# Patient Record
Sex: FEMALE | Race: WHITE | Employment: OTHER | URBAN - METROPOLITAN AREA
[De-identification: names, ages, dates, MRNs, and addresses within clinical notes are randomized per-mention and may not be internally consistent; named-entity substitution may affect disease eponyms.]

---

## 2017-04-18 ENCOUNTER — APPOINTMENT (OUTPATIENT)
Dept: GENERAL RADIOLOGY | Facility: CLINIC | Age: 82
End: 2017-04-18
Attending: EMERGENCY MEDICINE
Payer: MEDICARE

## 2017-04-18 ENCOUNTER — HOSPITAL ENCOUNTER (EMERGENCY)
Facility: CLINIC | Age: 82
Discharge: HOME | End: 2017-04-18
Attending: EMERGENCY MEDICINE | Admitting: EMERGENCY MEDICINE
Payer: MEDICARE

## 2017-04-18 VITALS
DIASTOLIC BLOOD PRESSURE: 72 MMHG | BODY MASS INDEX: 27.56 KG/M2 | HEIGHT: 61 IN | RESPIRATION RATE: 20 BRPM | TEMPERATURE: 98.1 F | WEIGHT: 146 LBS | SYSTOLIC BLOOD PRESSURE: 109 MMHG | OXYGEN SATURATION: 97 %

## 2017-04-18 DIAGNOSIS — M25.562 ACUTE PAIN OF BOTH KNEES: ICD-10-CM

## 2017-04-18 DIAGNOSIS — M25.561 ACUTE PAIN OF BOTH KNEES: ICD-10-CM

## 2017-04-18 LAB
ALBUMIN UR-MCNC: NEGATIVE MG/DL
AMORPH CRY #/AREA URNS HPF: ABNORMAL /HPF
ANION GAP SERPL CALCULATED.3IONS-SCNC: 12 MMOL/L (ref 3–14)
ANISOCYTOSIS BLD QL SMEAR: SLIGHT
APPEARANCE UR: ABNORMAL
BACTERIA #/AREA URNS HPF: ABNORMAL /HPF
BASOPHILS # BLD AUTO: 0 10E9/L (ref 0–0.2)
BASOPHILS NFR BLD AUTO: 0 %
BILIRUB UR QL STRIP: NEGATIVE
BUN SERPL-MCNC: 24 MG/DL (ref 7–30)
CALCIUM SERPL-MCNC: 9 MG/DL (ref 8.5–10.1)
CHLORIDE SERPL-SCNC: 102 MMOL/L (ref 94–109)
CO2 SERPL-SCNC: 22 MMOL/L (ref 20–32)
COLOR UR AUTO: ABNORMAL
CREAT SERPL-MCNC: 1.02 MG/DL (ref 0.52–1.04)
DACRYOCYTES BLD QL SMEAR: SLIGHT
DIFFERENTIAL METHOD BLD: ABNORMAL
EOSINOPHIL # BLD AUTO: 0 10E9/L (ref 0–0.7)
EOSINOPHIL NFR BLD AUTO: 0 %
ERYTHROCYTE [DISTWIDTH] IN BLOOD BY AUTOMATED COUNT: 15 % (ref 10–15)
GFR SERPL CREATININE-BSD FRML MDRD: 51 ML/MIN/1.7M2
GLUCOSE SERPL-MCNC: 124 MG/DL (ref 70–99)
GLUCOSE UR STRIP-MCNC: NEGATIVE MG/DL
HCT VFR BLD AUTO: 39.1 % (ref 35–47)
HGB BLD-MCNC: 12.4 G/DL (ref 11.7–15.7)
HGB UR QL STRIP: NEGATIVE
HYALINE CASTS #/AREA URNS LPF: 6 /LPF (ref 0–2)
INR PPP: 2.32 (ref 0.86–1.14)
KETONES UR STRIP-MCNC: 20 MG/DL
LEUKOCYTE ESTERASE UR QL STRIP: NEGATIVE
LYMPHOCYTES # BLD AUTO: 0.6 10E9/L (ref 0.8–5.3)
LYMPHOCYTES NFR BLD AUTO: 6 %
MCH RBC QN AUTO: 30.6 PG (ref 26.5–33)
MCHC RBC AUTO-ENTMCNC: 31.7 G/DL (ref 31.5–36.5)
MCV RBC AUTO: 97 FL (ref 78–100)
MONOCYTES # BLD AUTO: 2 10E9/L (ref 0–1.3)
MONOCYTES NFR BLD AUTO: 21 %
MUCOUS THREADS #/AREA URNS LPF: PRESENT /LPF
NEUTROPHILS # BLD AUTO: 6.9 10E9/L (ref 1.6–8.3)
NEUTROPHILS NFR BLD AUTO: 73 %
NITRATE UR QL: NEGATIVE
PH UR STRIP: 5 PH (ref 5–7)
PLATELET # BLD AUTO: 96 10E9/L (ref 150–450)
PLATELET # BLD EST: ABNORMAL 10*3/UL
POIKILOCYTOSIS BLD QL SMEAR: SLIGHT
POTASSIUM SERPL-SCNC: 4.5 MMOL/L (ref 3.4–5.3)
RBC # BLD AUTO: 4.05 10E12/L (ref 3.8–5.2)
RBC #/AREA URNS AUTO: 3 /HPF (ref 0–2)
SODIUM SERPL-SCNC: 136 MMOL/L (ref 133–144)
SP GR UR STRIP: 1.02 (ref 1–1.03)
SQUAMOUS #/AREA URNS AUTO: 1 /HPF (ref 0–1)
URN SPEC COLLECT METH UR: ABNORMAL
UROBILINOGEN UR STRIP-MCNC: 0 MG/DL (ref 0–2)
WBC # BLD AUTO: 9.5 10E9/L (ref 4–11)
WBC #/AREA URNS AUTO: 2 /HPF (ref 0–2)

## 2017-04-18 PROCEDURE — 85610 PROTHROMBIN TIME: CPT | Performed by: EMERGENCY MEDICINE

## 2017-04-18 PROCEDURE — 36415 COLL VENOUS BLD VENIPUNCTURE: CPT | Performed by: EMERGENCY MEDICINE

## 2017-04-18 PROCEDURE — 99284 EMERGENCY DEPT VISIT MOD MDM: CPT

## 2017-04-18 PROCEDURE — 81001 URINALYSIS AUTO W/SCOPE: CPT | Performed by: EMERGENCY MEDICINE

## 2017-04-18 PROCEDURE — 25000132 ZZH RX MED GY IP 250 OP 250 PS 637: Mod: GY | Performed by: EMERGENCY MEDICINE

## 2017-04-18 PROCEDURE — 85025 COMPLETE CBC W/AUTO DIFF WBC: CPT | Performed by: EMERGENCY MEDICINE

## 2017-04-18 PROCEDURE — 80048 BASIC METABOLIC PNL TOTAL CA: CPT | Performed by: EMERGENCY MEDICINE

## 2017-04-18 PROCEDURE — A9270 NON-COVERED ITEM OR SERVICE: HCPCS | Mod: GY | Performed by: EMERGENCY MEDICINE

## 2017-04-18 RX ORDER — ACETAMINOPHEN 325 MG/1
650 TABLET ORAL ONCE
Status: COMPLETED | OUTPATIENT
Start: 2017-04-18 | End: 2017-04-18

## 2017-04-18 RX ORDER — ACETAMINOPHEN AND CODEINE PHOSPHATE 300; 30 MG/1; MG/1
1 TABLET ORAL EVERY 6 HOURS PRN
Qty: 15 TABLET | Refills: 0 | Status: SHIPPED | OUTPATIENT
Start: 2017-04-18 | End: 2018-06-08

## 2017-04-18 RX ADMIN — ACETAMINOPHEN AND CODEINE PHOSPHATE 1 TABLET: 300; 30 TABLET ORAL at 11:32

## 2017-04-18 RX ADMIN — ACETAMINOPHEN 650 MG: 325 TABLET, FILM COATED ORAL at 09:52

## 2017-04-18 ASSESSMENT — ENCOUNTER SYMPTOMS
BACK PAIN: 1
NUMBNESS: 0

## 2017-04-18 NOTE — ED AVS SNAPSHOT
[facility] Emergency Department    [address]    [facility] [address]    Phone:  [phone]    Fax:  [phone]                                       Tameka Andersen   MRN: [id number]    Department:  [facility] Emergency Department   Date of Visit:  4/18/2017           Patient Information     Date Of Birth          1/7/1924        Your diagnoses for this visit were:     Acute pain of both knees        You were seen by Yoshi Almonte MD.      Follow-up Information     Follow up with Markie Becker MD. Schedule an appointment as soon as possible for a visit in 3 days.    Specialty:  Family Practice    Contact information:    [facility]  [address] [facility] [address]  [phone]          Follow up with [facility] Emergency Department.    Specialty:  EMERGENCY MEDICINE    Why:  As needed, If symptoms worsen    Contact information:    [address]  [facility] [id number] [phone]        Discharge Instructions         Reducing Knee Pain and Swelling    Many treatments can help reduce pain and swelling in your knee. Your healthcare provider or physical therapist may suggest one or more of the following treatments:    Icing your knee helps reduce swelling. You may be asked to ice your knee once a day or more. Apply ice for about 15 to 20 minutes at a time, with at least 40 minutes between sessions. Always keep a towel between the ice and your skin.     Keeping your leg raised above your heart helps excess fluid flow out of your knee joint. This reduces swelling.    Compression means wrapping an elastic bandage or neoprene sleeve snugly around your knees. This keeps fluid from collecting in your knee joint.    Electrical stimulation, done by a physical therapist or , can help reduce excess fluid in your knee joint.    Anti-inflammatory medicines may be prescribed by your healthcare  provider. You may take pills or receive injections in your knee.    Isometric (bushra) exercises strengthen the muscles that support your knee joint. They also help reduce excess fluid in your knee.    Massage helps fluid drain away from your knee.    5800-1094 [employer]. [address] [place] [address], [place] [address]. All rights reserved. This information is not intended as a substitute for professional medical care. Always follow your healthcare professional's instructions.          24 Hour Appointment Hotline       To make an appointment at any [facility], call [phone] ([phone]). If you don't have a family doctor or clinic, we will help you find one. [facility] clinics are conveniently located to serve the needs of you and your family.             Review of your medicines      START taking        Dose / Directions Last dose taken    acetaminophen-codeine 300-30 MG per tablet   Commonly known as:  TYLENOL/codeine #3   Dose:  1 tablet   Quantity:  15 tablet        Take 1 tablet by mouth every 6 hours as needed for pain   Refills:  0          Our records show that you are taking the medicines listed below. If these are incorrect, please call your family doctor or clinic.        Dose / Directions Last dose taken    ibuprofen 200 MG tablet   Commonly known as:  ADVIL/MOTRIN   Dose:  400 mg   Quantity:  30 tablet        Take 2 tablets by mouth every 8 hours as needed for pain.   Refills:  0        METOPROLOL TARTRATE PO        Refills:  0        WARFARIN SODIUM PO        Refills:  0                Prescriptions were sent or printed at these locations (1 Prescription)                   Other Prescriptions                Printed at Department/Unit printer (1 of 1)         acetaminophen-codeine (TYLENOL/CODEINE #3) 300-30 MG per tablet                Procedures and tests performed during your visit     Basic metabolic panel    CBC with platelets differential    INR    Straight cath for  urine    UA with Microscopic reflex to Culture    XR Knee Bilateral 1/2 Views      Orders Needing Specimen Collection     None      Pending Results     No orders found from 4/16/2017 to 4/19/2017.            Pending Culture Results     No orders found from 4/16/2017 to 4/19/2017.            Test Results From Your Hospital Stay        4/18/2017 10:55 AM      Narrative     XR KNEE BILATERAL 1/2 VW  4/18/2017 10:51 AM    HISTORY:  bilateral knee pain    COMPARISON:  None.        Impression     IMPRESSION:  Osteopenia. Small bilateral joint effusions. Very mild  osseous degenerative changes of the medial compartments bilaterally  with mild medial compartment narrowing on the right. Bilateral  posterior patellar spurs. No acute process.     JAIME REED MD         4/18/2017  1:00 PM      Component Results     Component Value Ref Range & Units Status    Color Urine Evelia  Final    Appearance Urine Slightly Cloudy  Final    Glucose Urine Negative NEG mg/dL Final    Bilirubin Urine Negative NEG Final    Ketones Urine 20 (A) NEG mg/dL Final    Specific Gravity Urine 1.021 1.003 - 1.035 Final    Blood Urine Negative NEG Final    pH Urine 5.0 5.0 - 7.0 pH Final    Protein Albumin Urine Negative NEG mg/dL Final    Urobilinogen mg/dL 0.0 0.0 - 2.0 mg/dL Final    Nitrite Urine Negative NEG Final    Leukocyte Esterase Urine Negative NEG Final    Source Catheterized Urine  Final    WBC Urine 2 0 - 2 /HPF Final    RBC Urine 3 (H) 0 - 2 /HPF Final    Bacteria Urine Few (A) NEG /HPF Final    Squamous Epithelial /HPF Urine 1 0 - 1 /HPF Final    Mucous Urine Present (A) NEG /LPF Final    Hyaline Casts 6 (H) 0 - 2 /LPF Final    Amorphous Crystals Few (A) NEG /HPF Final         4/18/2017 10:36 AM      Component Results     Component Value Ref Range & Units Status    Sodium 136 133 - 144 mmol/L Final    Potassium 4.5 3.4 - 5.3 mmol/L Final    Chloride 102 94 - 109 mmol/L Final    Carbon Dioxide 22 20 - 32 mmol/L Final    Anion Gap 12 3 -  14 mmol/L Final    Glucose 124 (H) 70 - 99 mg/dL Final    Urea Nitrogen 24 7 - 30 mg/dL Final    Creatinine 1.02 0.52 - 1.04 mg/dL Final    GFR Estimate 51 (L) >60 mL/min/1.7m2 Final    Non  GFR Calc    GFR Estimate If Black 61 >60 mL/min/1.7m2 Final    African American GFR Calc    Calcium 9.0 8.5 - 10.1 mg/dL Final         4/18/2017 10:42 AM      Component Results     Component Value Ref Range & Units Status    WBC 9.5 4.0 - 11.0 10e9/L Final    RBC Count 4.05 3.8 - 5.2 10e12/L Final    Hemoglobin 12.4 11.7 - 15.7 g/dL Final    Hematocrit 39.1 35.0 - 47.0 % Final    MCV 97 78 - 100 fl Final    MCH 30.6 26.5 - 33.0 pg Final    MCHC 31.7 31.5 - 36.5 g/dL Final    RDW 15.0 10.0 - 15.0 % Final    Platelet Count 96 (L) 150 - 450 10e9/L Final    Diff Method Manual Differential  Final    % Neutrophils 73.0 % Final    % Lymphocytes 6.0 % Final    % Monocytes 21.0 % Final    % Eosinophils 0.0 % Final    % Basophils 0.0 % Final    Absolute Neutrophil 6.9 1.6 - 8.3 10e9/L Final    Absolute Lymphocytes 0.6 (L) 0.8 - 5.3 10e9/L Final    Absolute Monocytes 2.0 (H) 0.0 - 1.3 10e9/L Final    Absolute Eosinophils 0.0 0.0 - 0.7 10e9/L Final    Absolute Basophils 0.0 0.0 - 0.2 10e9/L Final    Anisocytosis Slight  Final    Poikilocytosis Slight  Final    Teardrop Cells Slight  Final    Platelet Estimate Decreased  Final         4/18/2017 10:25 AM      Component Results     Component Value Ref Range & Units Status    INR 2.32 (H) 0.86 - 1.14 Final                Clinical Quality Measure: Blood Pressure Screening     Your blood pressure was checked while you were in the emergency department today. The last reading we obtained was  BP: 118/72 . Please read the guidelines below about what these numbers mean and what you should do about them.  If your systolic blood pressure (the top number) is less than 120 and your diastolic blood pressure (the bottom number) is less than 80, then your blood pressure is normal. There is  "nothing more that you need to do about it.  If your systolic blood pressure (the top number) is 120-139 or your diastolic blood pressure (the bottom number) is 80-89, your blood pressure may be higher than it should be. You should have your blood pressure rechecked within a year by a primary care provider.  If your systolic blood pressure (the top number) is 140 or greater or your diastolic blood pressure (the bottom number) is 90 or greater, you may have high blood pressure. High blood pressure is treatable, but if left untreated over time it can put you at risk for heart attack, stroke, or kidney failure. You should have your blood pressure rechecked by a primary care provider within the next 4 weeks.  If your provider in the emergency department today gave you specific instructions to follow-up with your doctor or provider even sooner than that, you should follow that instruction and not wait for up to 4 weeks for your follow-up visit.        Thank you for choosing [facility]       Thank you for choosing [facility] for your care. Our goal is always to provide you with excellent care. Hearing back from our patients is one way we can continue to improve our services. Please take a few minutes to complete the written survey that you may receive in the mail after you visit with us. Thank you!        [employer] Information     [employer] lets you send messages to your doctor, view your test results, renew your prescriptions, schedule appointments and more. To sign up, go to [email or web address] . Click on \"Log in\" on the left side of the screen, which will take you to the Welcome page. Then click on \"Sign up Now\" on the right side of the page.     You will be asked to enter the access code listed below, as well as some personal information. Please follow the directions to create your username and password.     Your access code is: [email or web address]  Expires: 2017  1:35 PM     Your access code will  in 90 days. If you " need help or a new code, please call your [facility] clinic or [phone].        Care EveryWhere ID     This is your Care EveryWhere ID. This could be used by other organizations to access your [facility] medical records  [id number]        After Visit Summary       This is your record. Keep this with you and show to your community pharmacist(s) and doctor(s) at your next visit.

## 2017-04-18 NOTE — ED AVS SNAPSHOT
[facility] Emergency Department    [address]    [facility] [address]    Phone:  [phone]    Fax:  [phone]                                       Tameka Andersen   MRN: [id number]    Department:  [facility] Emergency Department   Date of Visit:  4/18/2017           After Visit Summary Signature Page     I have received my discharge instructions, and my questions have been answered. I have discussed any challenges I see with this plan with the nurse or doctor.    ..........................................................................................................................................  Patient/Patient Representative Signature      ..........................................................................................................................................  Patient Representative Print Name and Relationship to Patient    ..................................................               ................................................  Date                                            Time    ..........................................................................................................................................  Reviewed by Signature/Title    ...................................................              ..............................................  Date                                                            Time

## 2017-04-18 NOTE — ED PROVIDER NOTES
"  History     Chief Complaint:  Leg Pain    HPI   Tameka Andersen is a 89 year old female who presents with bilateral leg pain. The patient first began experiencing left knee pain three days ago, and developed right knee pain shortly after. She states that it has been very difficult for her to walk due to the pain. Patient reports she has been on her feet more than usual, as she was making Easter dinner for two friends over the weekend. She has not had any numbness or tingling in her legs. She has needed steroid injection in one of her knees in the past. The patient does complain of some back pain over the past few months, and has been unable to lie flat at night. She also has been having some kidney issues which she has been seeing her PCP for.     Allergies:  Thorazine     Medications:    Warfarin sodium  Metoprolol tartrate  Ibuprofen    Past Medical History:    History reviewed.  No significant past medical history.    Past Surgical History:    History reviewed.  No significant past surgical history.    Family History:    History reviewed.  No significant family history.    Social History:  Relationship status:   Tobacco Use: Negative  Alcohol Use: Seldom  The patient presents with a friend.  The patient is retired.    Review of Systems   Musculoskeletal: Positive for back pain.        Positive for leg pain.   Neurological: Negative for numbness.   All other systems reviewed and are negative.      Physical Exam   First Vitals:  BP: 143/67  Temp: 98.1  F (36.7  C)  Temp src: Oral  Resp: 20  SpO2: 94 %  Height: 154.9 cm (5' 1\")  Weight: 66.2 kg (146 lb)    Physical Exam  Nursing note and vitals reviewed.  Constitutional: Cooperative. Appears younger than stated age.   HENT:   Mouth/Throat: Moist mucous membranes.   Eyes: EOMI, nonicteric sclera  Cardiovascular: Normal rate, regular rhythm, no murmurs, rubs, or gallops  Pulmonary/Chest: Effort normal and breath sounds normal. No respiratory distress. No " wheezes. No rales.   Abdominal: Soft. Nontender, nondistended, no guarding or rigidity. BS present.   Musculoskeletal: Knee ROM limited 2/2 pain bilaterally. Mild bilateral effusions. No warmth/erythema. No edema. Left paralumbar muscle tenderness.   Neurological: Alert. Moves all extremities spontaneously. Normal sensation BLE.    Skin: Skin is warm and dry. No rash noted.   Psychiatric: Normal mood and affect.       Emergency Department Course     Imaging:  Radiographic findings were communicated with the patient who voiced understanding of the findings.    Bilateral Knee XR, per radiology:   Osteopenia. Small bilateral joint effusions. Very mild osseous degenerative changes of the medial compartments bilaterally with mild medial compartment narrowing on the right. Bilateral posterior patellar spurs. No acute process.     Laboratory:  CBC: WBC 9.5, HGB 12.4, PLT 96 (L)  BMP: Glucose 124 (H), GFR 51 (L), o/w WNL (Creatinine 1.02)  1009: INR: 2.32 (H)  UA: Slightly cloudy, flory urine: Ketone 20 (A), RBC/HPF 3 (H), Bacteria few (A), Mucous present (A), Hyaline casts 6 (H), Amorphous crystals few (A), o/w WNL    Interventions:  0952: Tylenol, 650 mg, PO  1132: Tylenol #3, 300-30 mg, 1 tablet, PO    Emergency Department Course:  Nursing notes and vitals reviewed.  I performed an exam of the patient as documented above.  The above workup was undertaken.  1313: I rechecked the patient and discussed results.    Findings and plan explained to the Patient. Patient discharged home, status improved, with instructions regarding supportive care, medications, and reasons to return as well as the importance of close follow-up was reviewed.      Impression & Plan      Medical Decision Making:  Tameka Andersen is a 89 year old female presents with bilateral knee pain. Patient has also had chronic left flank pain as well. Uncertain etiology of patient's pain at this time. UA is negative, ruling out renal disease. Pain is worse with  movement, and appears to be more suggestive of musculoskeletal pain. Patient has had knee pain in the past, which has responded well to steroid injections. XR's consistent with arthritis. Doubt any radiculopathy or Cauda-Equina syndrome as cause of her symptoms. Initially tried tylenol, which was ineffective, but tylenol 3 was more effective. Also gave patient a walker, and she was able to ambulate much more effectively. Instructed to follow up with PCP later this week for recheck.     She is in stable condition at the time of discharge, indications for return to the ED were discussed as well as follow up. All questions were answered and She is in agreement with the plan.    Diagnosis:    ICD-10-CM   1. Acute pain of both knees M25.561    M25.562     Disposition:  Discharge to home with primary care follow up.     Discharge Medications:   ACETAMINOPHEN-CODEINE (TYLENOL/CODEINE #3) 300-30 MG PER TABLET    Take 1 tablet by mouth every 6 hours as needed for pain     Lopez VILLALPANDO, am serving as a scribe on 4/18/2017 at 9:27 AM to personally document services performed by Yoshi Almonte MD, based on my observations and the provider's statements to me.    [facility] EMERGENCY DEPARTMENT       Yoshi Almonte MD  04/18/17 [id number]

## 2017-04-18 NOTE — ED NOTES
"On Saturday knees started hurting while in [employer] service.  Pain to left knee, now also in right knee.  States, \"it got to I just couldn't walk\".  Also states for past two months if has been lying for a long time \"back hurts so bad\", pointing to left flank area. Denies urinary symptoms.  States, \"doctor says my kidneys aren't working 100%.\"  Lives at home.  Last night slept downstrairs as was too hard to get up and down the stairs. Pain 10/10 at this time.  Has not taken anything for pain.  ABCD's intact; a/O x 4.   "

## 2017-04-18 NOTE — DISCHARGE INSTRUCTIONS
Reducing Knee Pain and Swelling    Many treatments can help reduce pain and swelling in your knee. Your healthcare provider or physical therapist may suggest one or more of the following treatments:    Icing your knee helps reduce swelling. You may be asked to ice your knee once a day or more. Apply ice for about 15 to 20 minutes at a time, with at least 40 minutes between sessions. Always keep a towel between the ice and your skin.     Keeping your leg raised above your heart helps excess fluid flow out of your knee joint. This reduces swelling.    Compression means wrapping an elastic bandage or neoprene sleeve snugly around your knees. This keeps fluid from collecting in your knee joint.    Electrical stimulation, done by a physical therapist or , can help reduce excess fluid in your knee joint.    Anti-inflammatory medicines may be prescribed by your healthcare provider. You may take pills or receive injections in your knee.    Isometric (bushra) exercises strengthen the muscles that support your knee joint. They also help reduce excess fluid in your knee.    Massage helps fluid drain away from your knee.    3169-9193 [employer]. [address] [place] [address], [place] [address]. All rights reserved. This information is not intended as a substitute for professional medical care. Always follow your healthcare professional's instructions.

## 2018-06-11 ENCOUNTER — HOSPITAL ENCOUNTER (OUTPATIENT)
Dept: LAB | Facility: CLINIC | Age: 83
Discharge: HOME | DRG: 483 | End: 2018-06-11
Attending: ORTHOPAEDIC SURGERY | Admitting: ORTHOPAEDIC SURGERY
Payer: MEDICARE

## 2018-06-11 DIAGNOSIS — Z01.812 PRE-OPERATIVE LABORATORY EXAMINATION: ICD-10-CM

## 2018-06-11 LAB
ABO + RH BLD: NORMAL
ABO + RH BLD: NORMAL
BLD GP AB SCN SERPL QL: NORMAL
BLOOD BANK CMNT PATIENT-IMP: NORMAL
SPECIMEN EXP DATE BLD: NORMAL

## 2018-06-11 PROCEDURE — 86901 BLOOD TYPING SEROLOGIC RH(D): CPT | Performed by: ORTHOPAEDIC SURGERY

## 2018-06-11 PROCEDURE — 86850 RBC ANTIBODY SCREEN: CPT | Performed by: ORTHOPAEDIC SURGERY

## 2018-06-11 PROCEDURE — 36415 COLL VENOUS BLD VENIPUNCTURE: CPT | Performed by: ORTHOPAEDIC SURGERY

## 2018-06-11 PROCEDURE — 86900 BLOOD TYPING SEROLOGIC ABO: CPT | Performed by: ORTHOPAEDIC SURGERY

## 2018-06-12 ENCOUNTER — HOSPITAL ENCOUNTER (INPATIENT)
Facility: CLINIC | Age: 83
LOS: 3 days | Discharge: NURSING, REHAB OR LONG-TERM CARE | DRG: 483 | End: 2018-06-15
Attending: ORTHOPAEDIC SURGERY | Admitting: ORTHOPAEDIC SURGERY
Payer: MEDICARE

## 2018-06-12 ENCOUNTER — ANESTHESIA EVENT (OUTPATIENT)
Dept: SURGERY | Facility: CLINIC | Age: 83
DRG: 483 | End: 2018-06-12
Payer: MEDICARE

## 2018-06-12 ENCOUNTER — ANESTHESIA (OUTPATIENT)
Dept: SURGERY | Facility: CLINIC | Age: 83
DRG: 483 | End: 2018-06-12
Payer: MEDICARE

## 2018-06-12 ENCOUNTER — APPOINTMENT (OUTPATIENT)
Dept: GENERAL RADIOLOGY | Facility: CLINIC | Age: 83
DRG: 483 | End: 2018-06-12
Attending: ORTHOPAEDIC SURGERY
Payer: MEDICARE

## 2018-06-12 DIAGNOSIS — S42.402D CLOSED FRACTURE OF LEFT ELBOW WITH ROUTINE HEALING, SUBSEQUENT ENCOUNTER: Primary | ICD-10-CM

## 2018-06-12 PROBLEM — S42.402A ELBOW FRACTURE, LEFT: Status: ACTIVE | Noted: 2018-06-12

## 2018-06-12 LAB — INR PPP: 1.22 (ref 0.86–1.14)

## 2018-06-12 PROCEDURE — 01S40ZZ REPOSITION ULNAR NERVE, OPEN APPROACH: ICD-10-PCS | Performed by: ORTHOPAEDIC SURGERY

## 2018-06-12 PROCEDURE — 25000128 H RX IP 250 OP 636: Performed by: PHYSICIAN ASSISTANT

## 2018-06-12 PROCEDURE — 40000277 XR SURGERY CARM FLUORO LESS THAN 5 MIN W STILLS

## 2018-06-12 PROCEDURE — 25000125 ZZHC RX 250: Performed by: ORTHOPAEDIC SURGERY

## 2018-06-12 PROCEDURE — A9270 NON-COVERED ITEM OR SERVICE: HCPCS | Mod: GY | Performed by: ORTHOPAEDIC SURGERY

## 2018-06-12 PROCEDURE — 0RRM0JZ REPLACEMENT OF LEFT ELBOW JOINT WITH SYNTHETIC SUBSTITUTE, OPEN APPROACH: ICD-10-PCS | Performed by: ORTHOPAEDIC SURGERY

## 2018-06-12 PROCEDURE — 25000128 H RX IP 250 OP 636: Performed by: ANESTHESIOLOGY

## 2018-06-12 PROCEDURE — 36000065 ZZH SURGERY LEVEL 4 W FLUORO 1ST 30 MIN: Performed by: ORTHOPAEDIC SURGERY

## 2018-06-12 PROCEDURE — 37000009 ZZH ANESTHESIA TECHNICAL FEE, EACH ADDTL 15 MIN: Performed by: ORTHOPAEDIC SURGERY

## 2018-06-12 PROCEDURE — 36000063 ZZH SURGERY LEVEL 4 EA 15 ADDTL MIN: Performed by: ORTHOPAEDIC SURGERY

## 2018-06-12 PROCEDURE — C1713 ANCHOR/SCREW BN/BN,TIS/BN: HCPCS | Performed by: ORTHOPAEDIC SURGERY

## 2018-06-12 PROCEDURE — 25000132 ZZH RX MED GY IP 250 OP 250 PS 637: Mod: GY | Performed by: ORTHOPAEDIC SURGERY

## 2018-06-12 PROCEDURE — 25000566 ZZH SEVOFLURANE, EA 15 MIN: Performed by: ORTHOPAEDIC SURGERY

## 2018-06-12 PROCEDURE — 27210794 ZZH OR GENERAL SUPPLY STERILE: Performed by: ORTHOPAEDIC SURGERY

## 2018-06-12 PROCEDURE — 85610 PROTHROMBIN TIME: CPT | Performed by: ANESTHESIOLOGY

## 2018-06-12 PROCEDURE — 27810169 ZZH OR IMPLANT GENERAL: Performed by: ORTHOPAEDIC SURGERY

## 2018-06-12 PROCEDURE — 25000128 H RX IP 250 OP 636: Performed by: ORTHOPAEDIC SURGERY

## 2018-06-12 PROCEDURE — 71000012 ZZH RECOVERY PHASE 1 LEVEL 1 FIRST HR: Performed by: ORTHOPAEDIC SURGERY

## 2018-06-12 PROCEDURE — 25000128 H RX IP 250 OP 636: Performed by: NURSE ANESTHETIST, CERTIFIED REGISTERED

## 2018-06-12 PROCEDURE — 36415 COLL VENOUS BLD VENIPUNCTURE: CPT | Performed by: ANESTHESIOLOGY

## 2018-06-12 PROCEDURE — 25000128 H RX IP 250 OP 636

## 2018-06-12 PROCEDURE — 25800025 ZZH RX 258: Performed by: ORTHOPAEDIC SURGERY

## 2018-06-12 PROCEDURE — 40000985 XR ELBOW PORT LT 2VW: Mod: LT

## 2018-06-12 PROCEDURE — 71000013 ZZH RECOVERY PHASE 1 LEVEL 1 EA ADDTL HR: Performed by: ORTHOPAEDIC SURGERY

## 2018-06-12 PROCEDURE — 40000171 ZZH STATISTIC PRE-PROCEDURE ASSESSMENT III: Performed by: ORTHOPAEDIC SURGERY

## 2018-06-12 PROCEDURE — 25000125 ZZHC RX 250: Performed by: NURSE ANESTHETIST, CERTIFIED REGISTERED

## 2018-06-12 PROCEDURE — 12000007 ZZH R&B INTERMEDIATE

## 2018-06-12 PROCEDURE — 37000008 ZZH ANESTHESIA TECHNICAL FEE, 1ST 30 MIN: Performed by: ORTHOPAEDIC SURGERY

## 2018-06-12 PROCEDURE — 0PSL04Z REPOSITION LEFT ULNA WITH INTERNAL FIXATION DEVICE, OPEN APPROACH: ICD-10-PCS | Performed by: ORTHOPAEDIC SURGERY

## 2018-06-12 DEVICE — WIRE SURGICAL STEEL 18GA DS-18: Type: IMPLANTABLE DEVICE | Site: ELBOW | Status: FUNCTIONAL

## 2018-06-12 DEVICE — BONE CEMENT SIMPLEX W/TOBRAMYCIN 6197-9-001: Type: IMPLANTABLE DEVICE | Site: ELBOW | Status: FUNCTIONAL

## 2018-06-12 DEVICE — IMPLANTABLE DEVICE: Type: IMPLANTABLE DEVICE | Site: ELBOW | Status: FUNCTIONAL

## 2018-06-12 DEVICE — IMP WIRE KIRSCHNER 0.062X4" 1646-10-000: Type: IMPLANTABLE DEVICE | Site: ELBOW | Status: FUNCTIONAL

## 2018-06-12 RX ORDER — FENTANYL CITRATE 50 UG/ML
25-50 INJECTION, SOLUTION INTRAMUSCULAR; INTRAVENOUS
Status: DISCONTINUED | OUTPATIENT
Start: 2018-06-12 | End: 2018-06-12 | Stop reason: HOSPADM

## 2018-06-12 RX ORDER — GABAPENTIN 100 MG/1
100 CAPSULE ORAL 3 TIMES DAILY
Status: COMPLETED | OUTPATIENT
Start: 2018-06-12 | End: 2018-06-15

## 2018-06-12 RX ORDER — FENTANYL CITRATE 50 UG/ML
INJECTION, SOLUTION INTRAMUSCULAR; INTRAVENOUS PRN
Status: DISCONTINUED | OUTPATIENT
Start: 2018-06-12 | End: 2018-06-12

## 2018-06-12 RX ORDER — HYDROMORPHONE HYDROCHLORIDE 1 MG/ML
.3-.5 INJECTION, SOLUTION INTRAMUSCULAR; INTRAVENOUS; SUBCUTANEOUS
Status: DISCONTINUED | OUTPATIENT
Start: 2018-06-12 | End: 2018-06-15 | Stop reason: HOSPADM

## 2018-06-12 RX ORDER — ONDANSETRON 4 MG/1
4 TABLET, ORALLY DISINTEGRATING ORAL EVERY 6 HOURS PRN
Status: DISCONTINUED | OUTPATIENT
Start: 2018-06-12 | End: 2018-06-15 | Stop reason: HOSPADM

## 2018-06-12 RX ORDER — VANCOMYCIN HCL 900 MCG/MG
POWDER (GRAM) MISCELLANEOUS PRN
Status: DISCONTINUED | OUTPATIENT
Start: 2018-06-12 | End: 2018-06-12 | Stop reason: HOSPADM

## 2018-06-12 RX ORDER — HYDROMORPHONE HYDROCHLORIDE 1 MG/ML
.3-.5 INJECTION, SOLUTION INTRAMUSCULAR; INTRAVENOUS; SUBCUTANEOUS EVERY 10 MIN PRN
Status: DISCONTINUED | OUTPATIENT
Start: 2018-06-12 | End: 2018-06-12 | Stop reason: HOSPADM

## 2018-06-12 RX ORDER — MEPERIDINE HYDROCHLORIDE 25 MG/ML
12.5 INJECTION INTRAMUSCULAR; INTRAVENOUS; SUBCUTANEOUS
Status: DISCONTINUED | OUTPATIENT
Start: 2018-06-12 | End: 2018-06-12 | Stop reason: HOSPADM

## 2018-06-12 RX ORDER — ACETAMINOPHEN 325 MG/1
975 TABLET ORAL EVERY 8 HOURS
Status: COMPLETED | OUTPATIENT
Start: 2018-06-12 | End: 2018-06-15

## 2018-06-12 RX ORDER — CEFAZOLIN SODIUM 2 G/100ML
2 INJECTION, SOLUTION INTRAVENOUS
Status: COMPLETED | OUTPATIENT
Start: 2018-06-12 | End: 2018-06-12

## 2018-06-12 RX ORDER — PROPOFOL 10 MG/ML
INJECTION, EMULSION INTRAVENOUS PRN
Status: DISCONTINUED | OUTPATIENT
Start: 2018-06-12 | End: 2018-06-12

## 2018-06-12 RX ORDER — SODIUM CHLORIDE, SODIUM LACTATE, POTASSIUM CHLORIDE, CALCIUM CHLORIDE 600; 310; 30; 20 MG/100ML; MG/100ML; MG/100ML; MG/100ML
INJECTION, SOLUTION INTRAVENOUS CONTINUOUS
Status: DISCONTINUED | OUTPATIENT
Start: 2018-06-12 | End: 2018-06-12 | Stop reason: HOSPADM

## 2018-06-12 RX ORDER — NALOXONE HYDROCHLORIDE 0.4 MG/ML
.1-.4 INJECTION, SOLUTION INTRAMUSCULAR; INTRAVENOUS; SUBCUTANEOUS
Status: DISCONTINUED | OUTPATIENT
Start: 2018-06-12 | End: 2018-06-15 | Stop reason: HOSPADM

## 2018-06-12 RX ORDER — DEXAMETHASONE SODIUM PHOSPHATE 4 MG/ML
INJECTION, SOLUTION INTRA-ARTICULAR; INTRALESIONAL; INTRAMUSCULAR; INTRAVENOUS; SOFT TISSUE PRN
Status: DISCONTINUED | OUTPATIENT
Start: 2018-06-12 | End: 2018-06-12

## 2018-06-12 RX ORDER — LIDOCAINE HYDROCHLORIDE 10 MG/ML
INJECTION, SOLUTION INFILTRATION; PERINEURAL PRN
Status: DISCONTINUED | OUTPATIENT
Start: 2018-06-12 | End: 2018-06-12

## 2018-06-12 RX ORDER — DOCUSATE SODIUM 100 MG/1
100 CAPSULE, LIQUID FILLED ORAL 2 TIMES DAILY
Status: DISCONTINUED | OUTPATIENT
Start: 2018-06-12 | End: 2018-06-15 | Stop reason: HOSPADM

## 2018-06-12 RX ORDER — WARFARIN SODIUM 2.5 MG/1
2.5 TABLET ORAL
Status: COMPLETED | OUTPATIENT
Start: 2018-06-12 | End: 2018-06-12

## 2018-06-12 RX ORDER — ONDANSETRON 2 MG/ML
4 INJECTION INTRAMUSCULAR; INTRAVENOUS EVERY 6 HOURS PRN
Status: DISCONTINUED | OUTPATIENT
Start: 2018-06-12 | End: 2018-06-15 | Stop reason: HOSPADM

## 2018-06-12 RX ORDER — LIDOCAINE 40 MG/G
CREAM TOPICAL
Status: DISCONTINUED | OUTPATIENT
Start: 2018-06-12 | End: 2018-06-12 | Stop reason: HOSPADM

## 2018-06-12 RX ORDER — ONDANSETRON 2 MG/ML
4 INJECTION INTRAMUSCULAR; INTRAVENOUS EVERY 30 MIN PRN
Status: DISCONTINUED | OUTPATIENT
Start: 2018-06-12 | End: 2018-06-12 | Stop reason: HOSPADM

## 2018-06-12 RX ORDER — HYDRALAZINE HYDROCHLORIDE 20 MG/ML
2.5-5 INJECTION INTRAMUSCULAR; INTRAVENOUS EVERY 10 MIN PRN
Status: DISCONTINUED | OUTPATIENT
Start: 2018-06-12 | End: 2018-06-12 | Stop reason: HOSPADM

## 2018-06-12 RX ORDER — NALOXONE HYDROCHLORIDE 0.4 MG/ML
.1-.4 INJECTION, SOLUTION INTRAMUSCULAR; INTRAVENOUS; SUBCUTANEOUS
Status: DISCONTINUED | OUTPATIENT
Start: 2018-06-12 | End: 2018-06-12 | Stop reason: HOSPADM

## 2018-06-12 RX ORDER — ONDANSETRON 2 MG/ML
INJECTION INTRAMUSCULAR; INTRAVENOUS PRN
Status: DISCONTINUED | OUTPATIENT
Start: 2018-06-12 | End: 2018-06-12

## 2018-06-12 RX ORDER — ONDANSETRON 4 MG/1
4 TABLET, ORALLY DISINTEGRATING ORAL EVERY 30 MIN PRN
Status: DISCONTINUED | OUTPATIENT
Start: 2018-06-12 | End: 2018-06-12 | Stop reason: HOSPADM

## 2018-06-12 RX ORDER — ACETAMINOPHEN 325 MG/1
650 TABLET ORAL EVERY 4 HOURS PRN
Status: DISCONTINUED | OUTPATIENT
Start: 2018-06-15 | End: 2018-06-15 | Stop reason: HOSPADM

## 2018-06-12 RX ORDER — LIDOCAINE 40 MG/G
CREAM TOPICAL
Status: DISCONTINUED | OUTPATIENT
Start: 2018-06-12 | End: 2018-06-15 | Stop reason: HOSPADM

## 2018-06-12 RX ORDER — HYDROMORPHONE HYDROCHLORIDE 2 MG/1
2-4 TABLET ORAL EVERY 4 HOURS PRN
Status: DISCONTINUED | OUTPATIENT
Start: 2018-06-12 | End: 2018-06-15 | Stop reason: HOSPADM

## 2018-06-12 RX ORDER — CEFAZOLIN SODIUM 1 G/50ML
1 INJECTION, SOLUTION INTRAVENOUS EVERY 8 HOURS
Status: COMPLETED | OUTPATIENT
Start: 2018-06-12 | End: 2018-06-13

## 2018-06-12 RX ORDER — SODIUM CHLORIDE, SODIUM LACTATE, POTASSIUM CHLORIDE, CALCIUM CHLORIDE 600; 310; 30; 20 MG/100ML; MG/100ML; MG/100ML; MG/100ML
INJECTION, SOLUTION INTRAVENOUS CONTINUOUS
Status: DISCONTINUED | OUTPATIENT
Start: 2018-06-12 | End: 2018-06-15 | Stop reason: HOSPADM

## 2018-06-12 RX ORDER — HYDROXYZINE HYDROCHLORIDE 10 MG/1
10 TABLET, FILM COATED ORAL EVERY 6 HOURS PRN
Status: DISCONTINUED | OUTPATIENT
Start: 2018-06-12 | End: 2018-06-15 | Stop reason: HOSPADM

## 2018-06-12 RX ORDER — METOPROLOL TARTRATE 25 MG/1
25 TABLET, FILM COATED ORAL 2 TIMES DAILY
Status: DISCONTINUED | OUTPATIENT
Start: 2018-06-12 | End: 2018-06-15 | Stop reason: HOSPADM

## 2018-06-12 RX ORDER — CEFAZOLIN SODIUM 1 G/3ML
1 INJECTION, POWDER, FOR SOLUTION INTRAMUSCULAR; INTRAVENOUS SEE ADMIN INSTRUCTIONS
Status: DISCONTINUED | OUTPATIENT
Start: 2018-06-12 | End: 2018-06-12 | Stop reason: HOSPADM

## 2018-06-12 RX ADMIN — FENTANYL CITRATE 50 MCG: 50 INJECTION, SOLUTION INTRAMUSCULAR; INTRAVENOUS at 11:39

## 2018-06-12 RX ADMIN — DEXAMETHASONE SODIUM PHOSPHATE 4 MG: 4 INJECTION, SOLUTION INTRA-ARTICULAR; INTRALESIONAL; INTRAMUSCULAR; INTRAVENOUS; SOFT TISSUE at 10:21

## 2018-06-12 RX ADMIN — CEFAZOLIN 1 G: 1 INJECTION, POWDER, FOR SOLUTION INTRAMUSCULAR; INTRAVENOUS at 12:11

## 2018-06-12 RX ADMIN — LIDOCAINE HYDROCHLORIDE 50 MG: 10 INJECTION, SOLUTION INFILTRATION; PERINEURAL at 10:20

## 2018-06-12 RX ADMIN — WARFARIN SODIUM 2.5 MG: 2.5 TABLET ORAL at 19:29

## 2018-06-12 RX ADMIN — DOCUSATE SODIUM 100 MG: 100 CAPSULE, LIQUID FILLED ORAL at 19:30

## 2018-06-12 RX ADMIN — Medication 1 G: at 10:27

## 2018-06-12 RX ADMIN — Medication 1 G: at 14:08

## 2018-06-12 RX ADMIN — FENTANYL CITRATE 50 MCG: 50 INJECTION, SOLUTION INTRAMUSCULAR; INTRAVENOUS at 11:46

## 2018-06-12 RX ADMIN — HYDROMORPHONE HYDROCHLORIDE 0.5 MG: 1 INJECTION, SOLUTION INTRAMUSCULAR; INTRAVENOUS; SUBCUTANEOUS at 14:35

## 2018-06-12 RX ADMIN — SODIUM CHLORIDE, POTASSIUM CHLORIDE, SODIUM LACTATE AND CALCIUM CHLORIDE: 600; 310; 30; 20 INJECTION, SOLUTION INTRAVENOUS at 12:11

## 2018-06-12 RX ADMIN — PROPOFOL 70 MG: 10 INJECTION, EMULSION INTRAVENOUS at 10:20

## 2018-06-12 RX ADMIN — CEFAZOLIN SODIUM 2 G: 2 INJECTION, SOLUTION INTRAVENOUS at 10:13

## 2018-06-12 RX ADMIN — SODIUM CHLORIDE, POTASSIUM CHLORIDE, SODIUM LACTATE AND CALCIUM CHLORIDE: 600; 310; 30; 20 INJECTION, SOLUTION INTRAVENOUS at 17:15

## 2018-06-12 RX ADMIN — CEFAZOLIN SODIUM 1 G: 1 INJECTION, SOLUTION INTRAVENOUS at 19:35

## 2018-06-12 RX ADMIN — HYDROMORPHONE HYDROCHLORIDE 2 MG: 2 TABLET ORAL at 22:36

## 2018-06-12 RX ADMIN — SODIUM CHLORIDE, POTASSIUM CHLORIDE, SODIUM LACTATE AND CALCIUM CHLORIDE: 600; 310; 30; 20 INJECTION, SOLUTION INTRAVENOUS at 10:13

## 2018-06-12 RX ADMIN — GABAPENTIN 100 MG: 100 CAPSULE ORAL at 19:29

## 2018-06-12 RX ADMIN — ACETAMINOPHEN 975 MG: 325 TABLET, FILM COATED ORAL at 17:13

## 2018-06-12 RX ADMIN — ONDANSETRON 4 MG: 2 INJECTION INTRAMUSCULAR; INTRAVENOUS at 13:32

## 2018-06-12 RX ADMIN — ROCURONIUM BROMIDE 25 MG: 10 INJECTION INTRAVENOUS at 10:21

## 2018-06-12 RX ADMIN — METOPROLOL TARTRATE 25 MG: 25 TABLET ORAL at 19:29

## 2018-06-12 ASSESSMENT — ENCOUNTER SYMPTOMS: DYSRHYTHMIAS: 1

## 2018-06-12 ASSESSMENT — ACTIVITIES OF DAILY LIVING (ADL)
WHICH_OF_THE_ABOVE_FUNCTIONAL_RISKS_HAD_A_RECENT_ONSET_OR_CHANGE?: AMBULATION
RETIRED_EATING: 0-->INDEPENDENT
COGNITION: 0 - NO COGNITION ISSUES REPORTED
SWALLOWING: 0-->SWALLOWS FOODS/LIQUIDS WITHOUT DIFFICULTY
AMBULATION: 0-->INDEPENDENT
NUMBER_OF_TIMES_PATIENT_HAS_FALLEN_WITHIN_LAST_SIX_MONTHS: 1
RETIRED_COMMUNICATION: 0-->UNDERSTANDS/COMMUNICATES WITHOUT DIFFICULTY
TOILETING: 0-->INDEPENDENT
BATHING: 0-->INDEPENDENT
FALL_HISTORY_WITHIN_LAST_SIX_MONTHS: YES
DRESS: 0-->INDEPENDENT
TRANSFERRING: 0-->INDEPENDENT

## 2018-06-12 NOTE — IP AVS SNAPSHOT
"          [facility] [phone]                                              INTERAGENCY TRANSFER FORM - PHYSICIAN ORDERS   2018                    Hospital Admission Date: 2018  THELMA BONNER   : 1924  Sex: Female        Attending Provider: Laci Panchal MD     Allergies:  Thorazine [Chlorpromazine Hcl]    Infection:  None   Service:  SURGERY    Ht:  1.549 m (5' 1\")   Wt:  67.1 kg (148 lb)   Admission Wt:  67.1 kg (148 lb)    BMI:  27.96 kg/m 2   BSA:  1.7 m 2            Patient PCP Information     Provider PCP Type    Markie Becker MD General      ED Clinical Impression     Diagnosis Description Comment Added By Time Added    Closed fracture of left elbow with routine healing, subsequent encounter [S42.402D] Closed fracture of left elbow with routine healing, subsequent encounter [S42.402D]  Cassandra Crump PA-C 2018  8:06 AM      Hospital Problems as of 6/15/2018              Priority Class Noted POA    Elbow fracture, left Medium  2018 Yes      Non-Hospital Problems as of 6/15/2018     None      Code Status History     Date Active Date Inactive Code Status Order ID Comments User Context    This patient has a current code status but no historical code status.         Medication Review      START taking        Dose / Directions Comments    docusate sodium 100 MG capsule   Commonly known as:  COLACE        Dose:  100 mg   Take 1 capsule (100 mg) by mouth 2 times daily   Quantity:  30 capsule   Refills:  0        HYDROmorphone 2 MG tablet   Commonly known as:  DILAUDID        Dose:  2-4 mg   Take 1-2 tablets (2-4 mg) by mouth every 4 hours as needed for other (pain control or improvement in physical function. Hold dose for analgesic side effects.)   Quantity:  30 tablet   Refills:  0        hydrOXYzine 10 MG tablet   Commonly known as:  ATARAX        Dose:  10 mg   Take 1 tablet (10 mg) by mouth every 6 hours as needed for itching   Quantity:  45 " tablet   Refills:  0          CONTINUE these medications which have NOT CHANGED        Dose / Directions Comments    ACETAMINOPHEN PO        Dose:  1000 mg   Take 1,000 mg by mouth every 6 hours as needed for pain   Refills:  0        METOPROLOL TARTRATE PO        Dose:  25 mg   Take 25 mg by mouth 2 times daily   Refills:  0        polyethylene glycol 400 0.25 % Soln ophthalmic solution   Commonly known as:  BLINK TEARS        Dose:  1 drop   Place 1 drop into both eyes 2 times daily as needed for dry eyes   Refills:  0        VITAMIN D (CHOLECALCIFEROL) PO        Dose:  2000 Units   Take 2,000 Units by mouth daily   Refills:  0        WARFARIN SODIUM PO        Take by mouth daily 2.5 mg on Tues, Friday. 5 mg on all other days.   Refills:  0                Summary of Visit     Reason for your hospital stay       Left elbow fracture s/p TEA             After Care     Activity - Up with nursing assistance           Advance Diet as Tolerated       Follow this diet upon discharge: Regular       General info for SNF       Length of Stay Estimate: Short Term Care: Estimated # of Days <30  Condition at Discharge: Improving  Level of care:skilled   Rehabilitation Potential: Good  Admission H&P remains valid and up-to-date: Yes  Recent Chemotherapy: N/A  Use Nursing Home Standing Orders: Yes       General info for SNF       Length of Stay Estimate: Short Term Care: Estimated # of Days <30  Condition at Discharge: Improving  Level of care:skilled   Rehabilitation Potential: Excellent  Admission H&P remains valid and up-to-date: Yes  Recent Chemotherapy: N/A  Use Nursing Home Standing Orders: Yes       Intake and output       Every shift       Mantoux instructions       Give two-step Mantoux (PPD) Per Facility Policy Yes       Mantoux instructions       Give two-step Mantoux (PPD) Per Facility Policy Yes       Weight bearing status       Non-weight bearing to left upper extremity       Wound care (specify)       Site:   Left  arm  Instructions:  Leave splint and dressings intact             Referrals     Occupational Therapy Adult Consult       Evaluate and treat as clinically indicated.    Reason:  Nothing to left shoulder, AAROM to left hand/wrist       Physical Therapy Adult Consult       Evaluate and treat as clinically indicated.    Reason:  Nothing for left arm, just gait training/conditioning             Supplies     Pneumatic Compression Device        Bilateral calf. Remove 30 mins BID.             Follow-Up Appointment Instructions     Future Labs/Procedures    Follow Up and recommended labs and tests     Comments:    Ideally, follow up tomorrow, 6/15, with Dr. Panchal and team at [facility] for splint removal and wound check    Follow Up and recommended labs and tests     Comments:    Follow up on 6/26 at 2:00p at the [facility] office with Cassandra Crump PA-C. You will have an appointment to follow for a different splint.      Follow-Up Appointment Instructions     Follow Up and recommended labs and tests       Ideally, follow up tomorrow, 6/15, with Dr. Panchal and team at [place] office for splint removal and wound check       Follow Up and recommended labs and tests       Follow up on 6/26 at 2:00p at the [facility] office with Cassandra Crump PA-C. You will have an appointment to follow for a different splint.             Statement of Approval     Ordered          06/15/18 3326  I have reviewed and agree with all the recommendations and orders detailed in this document.  EFFECTIVE NOW     Approved and electronically signed by:  Cassandra Crump PA-C           06/14/18 1209  I have reviewed and agree with all the recommendations and orders detailed in this document.  EFFECTIVE NOW     Approved and electronically signed by:  Cassandra Crump PA-C

## 2018-06-12 NOTE — ANESTHESIA PREPROCEDURE EVALUATION
Anesthesia Evaluation     . Pt has had prior anesthetic. Type: General    No history of anesthetic complications          ROS/MED HX    ENT/Pulmonary:  - neg pulmonary ROS     Neurologic:  - neg neurologic ROS     Cardiovascular:     (+) ----. : . . . :. dysrhythmias a-fib, .       METS/Exercise Tolerance:     Hematologic:  - neg hematologic  ROS       Musculoskeletal:   (+) arthritis, fracture upper extremity, , -       GI/Hepatic:  - neg GI/hepatic ROS       Renal/Genitourinary:  - ROS Renal section negative       Endo:  - neg endo ROS       Psychiatric:  - neg psychiatric ROS       Infectious Disease:  - neg infectious disease ROS       Malignancy:      - no malignancy   Other:    - neg other ROS                 Physical Exam  Normal systems: cardiovascular and pulmonary    Airway   Mallampati: I  TM distance: >3 FB  Neck ROM: full    Dental   (+) partials    Cardiovascular       Pulmonary                     Anesthesia Plan      History & Physical Review  History and physical reviewed and following examination; no interval change.    ASA Status:  3 .    NPO Status:  > 8 hours    Plan for General, Periph. Nerve Block for postop pain and LMA with Intravenous and Propofol induction. Maintenance will be Balanced.    PONV prophylaxis:  Ondansetron (or other 5HT-3) and Dexamethasone or Solumedrol       Postoperative Care  Postoperative pain management:  IV analgesics, Oral pain medications and Peripheral nerve block (Single Shot).      Consents  Anesthetic plan, risks, benefits and alternatives discussed with:  Patient or representative and Patient..                          .

## 2018-06-12 NOTE — IP AVS SNAPSHOT
` ` Patient Information     Patient Name Sex Tameka Montiel ([id number]) Female 1924       Room Bed    0600 0600-01      Patient Demographics     Address Phone    [address] [place] [address] [phone] (Home)  none (Work)  none (Mobile)      Patient Ethnicity & Race     Ethnic Group Patient Race    American White      Emergency Contact(s)     Name Relation Home Work Mobile    Gordy Churchill Friend [phone] none [phone]    Deepthi Beverly Friend [phone] none [phone]    Patricia Robertson Friend [phone] none [phone]      Documents on File        Status Date Received Description       Documents for the Patient    Privacy Notice - [place]  07/15/05     Insurance Card Received 17     Face Sheet  07/15/05     External Medication Information Consent       Patient ID Received 17     Consent for Services - Hospital/Clinic  ()      Insurance Card Received 12     Privacy Notice - [place] Received 12     [facility] for Patient Signature       Consent for EHR Access  13 Copied from existing Consent for services - IP/ED collected on 2012    [facility] Specified Other       Consent for Services/Privacy Notice - Hospital/Clinic Received () 16     Insurance Card Received 17     Consent for Services/Privacy Notice - Hospital/Clinic       Consent for Services - Hospital and Clinic Received 18     HIE Auth Received 18     Insurance Card Received 18     Patient ID Received 18        Documents for the Encounter    H/P - History and Physical  18 [facility] - H AND P - 18    CMS IM for Patient Signature Received 18     Lab Result - HIM Scan  18 LAB - [facility]    EKG Cardiac - HIM Scan  18 EKG - [facility]    EKG Cardiac - HIM Scan  18 EKG - [facility]      Admission Information     Attending Provider Admitting Provider Admission Type Admission Date/Time     Laci Panchal MD Kelly, Edward W, MD Elective 06/12/18  0833    Discharge Date Hospital Service Auth/Cert Status Service Area     Surgery Incomplete [facility] SERVICES    Unit Room/Bed Admission Status       [facility]THO SPINE 0600/0600-01 Admission (Confirmed)       Admission     Complaint    Left elbow fracture, Elbow fracture, left      Hospital Account     Name Acct ID Class Status Primary Coverage    Tameka Andersen [id number] Inpatient Open MEDICARE - MEDICARE FOR HB SUPPLEMENT            Guarantor Account (for Hospital Account #[id number])     Name Relation to Pt Service Area Active? Acct Type    Tameka Andersen  FCS Yes Personal/Family    Address Phone          [address] [facility] [place] [address] [phone](H)  none(O)              Coverage Information (for Hospital Account #[id number])     1. MEDICARE/MEDICARE FOR HB SUPPLEMENT     F/O Payor/Plan Precert #    MEDICARE/MEDICARE FOR HB SUPPLEMENT     Subscriber Subscriber #    Tameka Andersen [id number]    Address Phone    [employer]  [address]  [place] [address] [phone]          2. [employer]     F/O Payor/Plan Precert #    [employer]     Subscriber Subscriber #    Tameka Andersen [id number]    Address Phone    [address]  [place] [address] [phone]

## 2018-06-12 NOTE — ANESTHESIA POSTPROCEDURE EVALUATION
Patient: Tameka Andersen    Procedure(s):  left total elbow arthroplasty  - Wound Class: I-Clean    Diagnosis:Left elbow fracture  Diagnosis Additional Information: Left elbow fracture  Dr Panchal    Anesthesia Type:  General, Periph. Nerve Block for postop pain, LMA    Note:  Anesthesia Post Evaluation    Patient location during evaluation: PACU  Patient participation: Able to fully participate in evaluation  Level of consciousness: awake  Pain management: adequate  Airway patency: patent  Cardiovascular status: acceptable  Respiratory status: acceptable  Hydration status: acceptable  PONV: none     Anesthetic complications: None          Last vitals:  Vitals:    06/12/18 1550 06/12/18 1615 06/12/18 1645   BP:  166/73 153/61   Resp: 17 20 22   Temp:  96.1  F (35.6  C)    SpO2: 96% 94% 94%         Electronically Signed By: Esteban Henry MD  June 12, 2018  5:05 PM

## 2018-06-12 NOTE — BRIEF OP NOTE
[facility] Brief Operative Note    Pre-operative diagnosis: Left elbow fracture   Post-operative diagnosis same   Procedure: Procedure(s):  left total elbow arthroplasty  - Wound Class: I-Clean   Surgeon(s): Surgeon(s) and Role:     * Laci Panchal MD - Primary     * Cassandra Crump PA-C   Estimated blood loss: 50 mL    Specimens: * No specimens in log *   Findings: See op note

## 2018-06-12 NOTE — IP AVS SNAPSHOT
` `     [facility] ORTHO SPINE: [phone]            Medication Administration Report for Tameka Andersen as of 06/15/18 0137   Legend:    Given Hold Not Given Due Canceled Entry Other Actions    Time Time (Time) Time  Time-Action       Inactive    Active    Linked        Medications 06/09/18 06/10/18 06/11/18 06/12/18 06/13/18 06/14/18 06/15/18    acetaminophen (TYLENOL) tablet 650 mg  Dose: 650 mg  Freq: EVERY 4 HOURS PRN Route: PO  PRN Reason: other  PRN Comment: multimodal surgical pain management along with NSAIDS and opioid medication as indicated based on pain control and physical function.  Start: 06/15/18 0000   Admin Instructions: May give first dose 4 hours after last dose scheduled of acetaminophen.  Maximum acetaminophen dose from all sources = 75 mg/kg/day not to exceed 4 grams/day.    Admin. Amount: 2 tablet (2 × 325 mg tablet)  Dispense Loc: [facility] ADS MS6E  POC: Post-procedure               docusate sodium (COLACE) capsule 100 mg  Dose: 100 mg  Freq: 2 TIMES DAILY Route: PO  Start: 06/12/18 2000   Admin Instructions: To prevent constipation.  Hold for loose stools.    Admin. Amount: 1 capsule (1 × 100 mg capsule)  Last Admin: 06/14/18 2018  Dispense Loc: [facility]S MS6E  POC: Post-procedure        1930 (100 mg)-Given        0803 (100 mg)-Given       2104 (100 mg)-Given        0830 (100 mg)-Given       2018 (100 mg)-Given        (0751)-Not Given [C]       [ ] 2000           HYDROmorphone (DILAUDID) tablet 2-4 mg  Dose: 2-4 mg  Freq: EVERY 4 HOURS PRN Route: PO  PRN Reason: other  PRN Comment: pain control or improvement in physical function. Hold dose for analgesic side effects.  Start: 06/12/18 1604   Admin Instructions: Start with the lowest dose. May adjust dose by 2 mg every 4 hours as needed.  Notify provider to assess for uncontrolled pain or analgesic side effects.  Hold while on PCA or with regular IV opioid dosing. Maximum total is 24 mg in 24 hours.    Admin. Amount: 1-2 tablet (1-2 × 2 mg  tablet)  Last Admin: 06/14/18 0117  Dispense Loc:  ADS MS6E  POC: Post-procedure        2236 (2 mg)-Given        0250 (4 mg)-Given       0803 (4 mg)-Given       1202 (4 mg)-Given       1600 (4 mg)-Given        0117 (4 mg)-Given            HYDROmorphone (PF) (DILAUDID) injection 0.3-0.5 mg  Dose: 0.3-0.5 mg  Freq: EVERY 2 HOURS PRN Route: IV  PRN Reason: other  PRN Comment: pain control or improvement in physical function. Hold dose for analgesic side effects.  Start: 06/12/18 1604   Admin Instructions: Start at the lowest dose.  May adjust dose by 0.1 mg every 2 hours as needed.   Notify provider to assess for uncontrolled pain or analgesic side effects.  Hold while on IV PCA or with regular IV opioid dosing.<br><br>Give IF unable to tolerate oral option for pain control<br>  For ordered doses up to 4 mg give IV Push undiluted. Administer each 2mg over 2-5 minutes.    Admin. Amount: 0.3-0.5 mg  Last Admin: 06/13/18 0208  Dispense Loc:  ADS MS6E  POC: Post-procedure         0208 (0.5 mg)-Given             hydrOXYzine (ATARAX) tablet 10 mg  Dose: 10 mg  Freq: EVERY 6 HOURS PRN Route: PO  PRN Reason: itching  Start: 06/12/18 1604   Admin Instructions: Caution to be used when administering multiple CNS depressing meds within a short time frame.    Admin. Amount: 1 tablet (1 × 10 mg tablet)  Last Admin: 06/13/18 1202  Dispense Loc:  ADS MS6E  POC: Post-procedure         0359 (10 mg)-Given       1202 (10 mg)-Given             hypromellose-dextran (ARTIFICAL TEARS) 0.1-0.3 % ophthalmic solution 1 drop  Dose: 1 drop  Freq: 2 TIMES DAILY PRN Route: Both Eyes  PRN Reason: dry eyes  Start: 06/12/18 1605   Admin Instructions: Formulary alternate for Blink Tears<br>    Admin. Amount: 1 drop  Dispense Loc:  Main Pharmacy  Volume: 15 mL               lactated ringers infusion  Rate: 100 mL/hr   Freq: CONTINUOUS Route: IV  Start: 06/12/18 1615   Admin Instructions: Change to saline lock when PO well tolerated.    Last  "Admin: 06/13/18 0258  Dispense Loc: [facility] Floor Stock  Volume: 1,000 mL  POC: Post-procedure        1715 ( )-New Bag        0258 ( )-New Bag             lidocaine (LMX4) kit  Freq: EVERY 1 HOUR PRN Route: Top  PRN Reason: pain  PRN Comment: with VAD insertion or accessing implanted port.  Start: 06/12/18 1604   Admin Instructions: Do NOT give if patient has a history of allergy to any local anesthetic or any \"marla\" product.   Apply 30 minutes prior to VAD insertion or port access.  MAX Dose:  2.5 g (  of 5 g tube)    Dispense Loc:  Main Pharmacy  POC: Post-procedure               lidocaine 1 % 1 mL  Dose: 1 mL  Freq: EVERY 1 HOUR PRN Route: OTHER  PRN Comment: mild pain with VAD insertion or accessing implanted port  Start: 06/12/18 1604   Admin Instructions: Do NOT give if patient has a history of allergy to any local anesthetic or any \"marla\" product. MAX dose 1 mL subcutaneous OR intradermal in divided doses.    Admin. Amount: 1 mL  Dispense Loc: [facility] Floor Stock  Volume: 2 mL  POC: Post-procedure               melatonin tablet 1 mg  Dose: 1 mg  Freq: AT BEDTIME PRN Route: PO  PRN Reason: sleep  Start: 06/13/18 2000   Admin Instructions: POD 1.  Do not give unless at least 6 hours of uninterrupted sleep is expected.    Admin. Amount: 1 tablet (1 × 1 mg tablet)  Dispense Loc:  ADS MS6E  POC: Post-procedure               metoprolol tartrate (LOPRESSOR) tablet 25 mg  Dose: 25 mg  Freq: 2 TIMES DAILY Route: PO  Start: 06/12/18 2000   Admin. Amount: 1 tablet (1 × 25 mg tablet)  Last Admin: 06/15/18 0751  Dispense Loc:  ADS MS6E        1929 (25 mg)-Given        0803 (25 mg)-Given       2104 (25 mg)-Given        0830 (25 mg)-Given       2018 (25 mg)-Given        0751 (25 mg)-Given       [ ] 2000           naloxone (NARCAN) injection 0.1-0.4 mg  Dose: 0.1-0.4 mg  Freq: EVERY 2 MIN PRN Route: IV  PRN Reason: opioid reversal  Start: 06/12/18 1604   Admin Instructions: For respiratory rate LESS than or EQUAL to 8.  " Partial reversal dose:  0.1 mg titrated q 2 minutes for Analgesia Side Effects Monitoring Sedation Level of 3 (frequently drowsy, arousable, drifts to sleep during conversation).Full reversal dose:  0.4 mg bolus for Analgesia Side Effects Monitoring Sedation Level of 4 (somnolent, minimal or no response to stimulation).  For ordered doses up to 2mg give IVP. Give each 0.4mg over 15 seconds in emergency situations. For non-emergent situations further dilute in 9mL of NS to facilitate titration of response.    Admin. Amount: 0.1-0.4 mg = 0.25-1 mL Conc: 0.4 mg/mL  Dispense Loc: [facility]S MS6E  Volume: 1 mL  POC: Post-procedure               ondansetron (ZOFRAN-ODT) ODT tab 4 mg  Dose: 4 mg  Freq: EVERY 6 HOURS PRN Route: PO  PRN Reasons: nausea,vomiting  Start: 06/12/18 1604   Admin Instructions: This is Step 1 of nausea and vomiting management.  If nausea not resolved in 15 minutes, go to Step 2 prochlorperazine (COMPAZINE). Do not push through foil backing. Peel back foil and gently remove. Place on tongue immediately. Administration with liquid unnecessary  With dry hands, peel back foil backing and gently remove tablet; do not push oral disintegrating tablet through foil backing; administer immediately on tongue and oral disintegrating tablet dissolves in seconds; then swallow with saliva; liquid not required.    Admin. Amount: 1 tablet (1 × 4 mg tablet)  Dispense Loc: [facility] MS[facility]  POC: Post-procedure              Or  ondansetron (ZOFRAN) injection 4 mg  Dose: 4 mg  Freq: EVERY 6 HOURS PRN Route: IV  PRN Reasons: nausea,vomiting  Start: 06/12/18 1604   Admin Instructions: This is Step 1 of nausea and vomiting management.  If nausea not resolved in 15 minutes, go to Step 2 prochlorperazine (COMPAZINE).  Irritant. For ordered doses up to 4 mg, give IV Push undiluted over 2-5 minutes.    Admin. Amount: 4 mg = 2 mL Conc: 4 mg/2 mL  Dispense Loc: [facility] MS6E  Infused Over: 2-5 Minutes  Volume: 2 mL  POC: Post-procedure                sodium chloride (PF) 0.9% PF flush 3 mL  Dose: 3 mL  Freq: EVERY 8 HOURS Route: IK  Start: 18 1615   Admin Instructions: And Q1H PRN, to lock peripheral IV dormant line.    Admin. Amount: 3 mL  Last Admin: 06/15/18 0329  Dispense Loc: [facility] Floor Stock  Volume: 4 mL  POC: Post-procedure        ()-Not Given        (208)-Not Given       0804 (3 mL)-Given               (830)-Not Given              ()-Not Given        0329 (3 mL)-Given       (751)-Not Given       [ ] 1615           sodium chloride (PF) 0.9% PF flush 3 mL  Dose: 3 mL  Freq: EVERY 1 HOUR PRN Route: IK  PRN Reason: line flush  PRN Comment: for peripheral IV flush post IV meds  Start: 18 1604   Admin. Amount: 3 mL  Dispense Loc: [facility] Floor Stock  Volume: 4 mL  POC: Post-procedure               Warfarin Therapy Reminder (Check START DATE - warfarin may be starting in the FUTURE)  Dose: 1 each  Freq: CONTINUOUS PRN Route: XX  Start: 18 1822   Admin Instructions: *Note to reorder warfarin daily*  Pharmacy Warfarin Dosing Service  Patient is on Warfarin Therapy - check for daily order    Admin. Amount: 1 each  Dispense Loc:  Main Pharmacy              Future Medications  Medications 06/09/18 06/10/18 06/11/18 06/12/18 06/13/18 06/14/18 06/15/18       warfarin (COUMADIN) tablet 7.5 mg  Dose: 7.5 mg  Freq: ONCE AT 6PM Route: PO  Start: 06/15/18 1800   Admin. Amount: 1 tablet (1 × 7.5 mg tablet)  Dispense Loc:  ADS MS6E  Administrations Remainin           [ ] 1800          Completed Medications  Medications 06/09/18 06/10/18 06/11/18 06/12/18 06/13/18 06/14/18 06/15/18         Dose: 975 mg  Freq: EVERY 8 HOURS Route: PO  Start: 18 1630   End: 06/15/18 1220   Admin Instructions: Do not use if patient has an active opioid/acetaminophen combined analgesic product ordered for pain.  Maximum acetaminophen dose from all sources = 75 mg/kg/day not to exceed 4 grams/day.    Admin. Amount: 3 tablet (3 × 325 mg  tablet)  Last Admin: 06/15/18 1220  Dispense Loc:  ADS MS6E  Administrations Remainin  POC: Post-procedure        1713 (975 mg)-Given        0011 (975 mg)-Given       0803 (975 mg)-Given       1600 (975 mg)-Given        0020 (975 mg)-Given       0830 (975 mg)-Given       1613 (975 mg)-Given        0330 (975 mg)-Given       1220 (975 mg)-Given             Dose: 1 g  Freq: EVERY 8 HOURS Route: IV  Indications of Use: PERIOPERATIVE PHARMACOPROPHYLAXIS  Start: 18   End: 18   Admin Instructions: First post-op dose due 8 hours after intra-op dose, see eMAR.    Admin. Amount: 1 g = 50 mL Conc: 1 g/50 mL  Last Admin: 18  Dispense Loc:  Main Pharmacy  Infused Over: 30 Minutes  Administrations Remainin  Volume: 50 mL  POC: Post-procedure        1935 (1 g)-New Bag        0348 (1 g)-New Bag               Dose: 100 mg  Freq: 3 TIMES DAILY Route: PO  Start: 18   End: 06/15/18 1422   Admin. Amount: 1 capsule (1 × 100 mg capsule)  Last Admin: 06/15/18 1422  Dispense Loc:  ADS MS6E  Administrations Remainin  POC: Post-procedure        1929 (100 mg)-Given        0803 (100 mg)-Given       1600 (100 mg)-Given       2104 (100 mg)-Given        0830 (100 mg)-Given       1325 (100 mg)-Given       2018 (100 mg)-Given        0751 (100 mg)-Given       1422 (100 mg)-Given             Dose: 2.5 mg  Freq: ONCE AT 6PM Route: PO  Start: 18 184   End: 18   Admin. Amount: 1 tablet (1 × 2.5 mg tablet)  Last Admin: 18  Dispense Loc:  ADS MS6E  Administrations Remainin        1929 (2.5 mg)-Given                Dose: 5 mg  Freq: ONCE AT 6PM Route: PO  Start: 18   End: 18   Admin. Amount: 1 tablet (1 × 5 mg tablet)  Last Admin: 18  Dispense Loc:  ADS MS6E  Administrations Remainin           (5 mg)-Given              Dose: 5 mg  Freq: ONCE AT 6PM Route: PO  Start: 18   End: 18   Admin. Amount: 1  tablet (1 × 5 mg tablet)  Last Admin: 18  Dispense Loc:  ADS MS6E  Administrations Remainin          (5 mg)-Given            Discontinued Medications  Medications 06/09/18 06/10/18 06/11/18 06/12/18 06/13/18 06/14/18 06/15/18         Freq: PRN  Start: 18 1350   End: 18 1604   Last Admin: 18 1350  Volume: 2,000 mL  POC: Intra-procedure   Mixture Administration Information:   Medication Type Amount   bacitracin 02428 units SOLR Additives 100,000 Units   gentamicin 40 MG/ML SOLN Additives 200 mg   ceFAZolin 1 GM SOLR Additives 2 g   sodium chloride 0.9% (bag) 0.9 % SOLN Base 2,000 mL                1350 (800 mL)-Given       1604-Med Discontinued            Freq: CONTINUOUS Route: IR  Start: 18 0915   End: 18 1605   Admin Instructions: *For Irrigation use ONLY*.    Dispense Loc:  Main Pharmacy  Volume: 2,000 mL   Mixture Administration Information:   Medication Type Amount   bacitracin 23741 units SOLR Additives 100,000 Units   gentamicin 40 MG/ML SOLN Additives 200 mg   ceFAZolin 1 GM SOLR Additives 2 g   sodium chloride 0.9% (bottle) 0.9 % SOLN Base 2,000 mL                1605-Med Discontinued                   Dose: 25-50 mcg  Freq: EVERY 2 MIN PRN Route: IV  PRN Reason: other  PRN Comment: acute pain while in PACU.  Start: 18 1400   End: 18 160   Admin Instructions: MAX cumulative dose = 250 mcg.   Use fentaNYL (SUBLIMAZE) initially, as a short acting agent for acute pain control. If insufficient, or a longer acting agent is needed, begin morphine or HYDROmorphone (DILAUDID) if ordered.  For ordered doses up to 100 mcg give IV Push undiluted over a minimum of 3-5 minutes.    Admin. Amount: 25-50 mcg = 0.5-1 mL Conc: 50 mcg/mL  Dispense Loc:  ADS OR  Volume: 2 mL  POC: PACU        1604-Med Discontinued            Dose: 25-50 mcg  Freq: EVERY 15 MIN PRN Route: IV  PRN Reason: other  PRN Comment: acute pain while in Phase II  Indications of Use:  SEDATION  Start: 06/12/18 1400   End: 06/12/18 1604   Admin Instructions: MAX cumulative dose = 250 mcg.   Use fentaNYL (SUBLIMAZE) initially, as a short acting agent for acute pain control. If insufficient, or a longer acting agent is needed, begin morphine or HYDROmorphone (DILAUDID) if ordered.  For ordered doses up to 100 mcg give IV Push undiluted over a minimum of 3-5 minutes.    Admin. Amount: 25-50 mcg = 0.5-1 mL Conc: 50 mcg/mL  Dispense Loc: [facility] ADS OR  Volume: 2 mL  POC: Phase ll        1604-Med Discontinued            Dose: 2.5-5 mg  Freq: EVERY 10 MIN PRN Route: IV  PRN Reason: high blood pressure  PRN Comment: for Systolic Blood Pressure greater than 160 mmHg and Heart Rate greater than 60 bpm.    Start: 06/12/18 1400   End: 06/12/18 1604   Admin Instructions: Max cumulative dose = 20 mg. For PACU USE ONLY  For ordered doses up to 40 mg, give IV Push undiluted over 1 minute.    Admin. Amount: 2.5-5 mg = 0.125-0.25 mL Conc: 20 mg/mL  Dispense Loc: [facility] ADS ANES-MED   Infused Over: 1 Minutes  Volume: 0.25 mL  POC: PACU        1604-Med Discontinued            Dose: 0.3-0.5 mg  Freq: EVERY 10 MIN PRN Route: IV  PRN Reason: other  PRN Comment: acute pain.  May administer if Respiratory Rate is greater than 10  Start: 06/12/18 1400   End: 06/12/18 1604   Admin Instructions: Max cumulative dose = 2 mg  If fentaNYL (SUBLIMAZE) is also ordered, use HYDROmorphone (DILAUDID) if pain control insufficient with fentaNYL (SUMBLIMAZE) or a longer acting agent is needed.  For ordered doses up to 4 mg give IV Push undiluted. Administer each 2mg over 2-5 minutes.    Admin. Amount: 0.3-0.5 mg  Last Admin: 06/12/18 1435  Dispense Loc: [facility] ADS ANES02  POC: PACU/Phase II        1435 (0.5 mg)-Given       1604-Med Discontinued            Rate: 100 mL/hr   Freq: CONTINUOUS Route: IV  Start: 06/12/18 1415   End: 06/12/18 1604   Admin Instructions: Continue until IV catheter is weaned    Dispense Loc: [facility] Floor Stock  Volume: 1,000  mL  POC: PACU/Phase II               1604-Med Discontinued            Dose: 12.5 mg  Freq: EVERY 15 MIN PRN Route: IV  PRN Reason: post anesthesia shivering  Start: 18   End: 18   Admin Instructions: Give IV Push undiluted. 10-40 mg over 2-3 minutes, up to 125 mg over 3-15 minutes    Admin. Amount: 12.5 mg = 0.5 mL Conc: 25 mg/mL  Dispense Loc:  Main Pharmacy  Administrations Remainin  Volume: 1 mL  POC: PACU/Phase II        1604-Med Discontinued            Dose: 0.1-0.4 mg  Freq: EVERY 2 MIN PRN Route: IV  PRN Reason: opioid reversal  Start: 18   End: 18   Admin Instructions: For apnea or imminent respiratory arrest: give 0.4 mg IV undiluted Q 2 minutes PRN until desired degree of reversal is obtained, stop opioid and notify provider. Continue monitoring until discharge are criteria met for a minimum of 2 hours.  For severe sedation, decrease in respiratory depth, quality or Respiratory Rate greater than 8: give 0.1 mg IV Q 2 minutes x 3 doses, stop opioid and notify provider.  Try to minimize reversal of analgesia especially in end-of-life patients.  Continue monitoring until discharge criteria are met for a minimum of 2 hours.  For ordered doses up to 2mg give IVP. Give each 0.4mg over 15 seconds in emergency situations. For non-emergent situations further dilute in 9mL of NS to facilitate titration of response.    Admin. Amount: 0.1-0.4 mg = 0.25-1 mL Conc: 0.4 mg/mL  Dispense Loc:  ADS ANES-MED   Volume: 1 mL  POC: PACU/Phase II        1604-Med Discontinued            Dose: 4 mg  Freq: EVERY 30 MIN PRN Route: PO  PRN Reasons: nausea,vomiting  Start: 18   End: 18   Admin Instructions: MAX total dose = 8 mg, including OR dosing. This is step 1 of nausea and vomiting management.  If not resolved in 15 minutes, then go to step 2 [prochlorperazine (COMPAZINE) if ordered].  With dry hands, peel back foil backing and gently remove tablet; do not  push oral disintegrating tablet through foil backing; administer immediately on tongue and oral disintegrating tablet dissolves in seconds; then swallow with saliva; liquid not required.    Admin. Amount: 1 tablet (1 × 4 mg tablet)  Dispense Loc: [facility] ADS OR-B  Administrations Remainin  POC: PACU/Phase II        1604-Med Discontinued         Or    Dose: 4 mg  Freq: EVERY 30 MIN PRN Route: IV  PRN Reasons: nausea,vomiting  Start: 18 1400   End: 18   Admin Instructions: MAX total dose = 8 mg, including OR dosing. This is step 1 of nausea and vomiting management.  If not resolved in 15 minutes, then go to step 2 [prochlorperazine (COMPAZINE) if ordered].  Irritant. For ordered doses up to 4 mg, give IV Push undiluted over 2-5 minutes.    Admin. Amount: 4 mg = 2 mL Conc: 4 mg/2 mL  Dispense Loc: [facility] ADS OR-B  Infused Over: 2-5 Minutes  Administrations Remainin  Volume: 2 mL  POC: PACU/Phase II        1604-Med Discontinued            Freq: CONTINUOUS PRN Route: XX  Start: 18 1400   End: 18   Admin Instructions: May administer oral pain medications as ordered by surgeon for take home use.  Discontinue IV pain medication prior to administration of oral pain medication.    Dispense Loc: Non Rx dispense  POC: PACU/Phase II        1604-Med Discontinued            Freq: PRN  Start: 18 1350   End: 18 1604   Last Admin: 18 1350  POC: Intra-procedure        1350 (1 g)-Given       1604-Med Discontinued            Freq: DAILY Route: PO  Start: 18 1615   End: 18 1842               1842-Med Discontinued       Medications 06/09/18 06/10/18 06/11/18 06/12/18 06/13/18 06/14/18 06/15/18

## 2018-06-12 NOTE — PHARMACY-ADMISSION MEDICATION HISTORY
.Admission medication history interview status for this patient is complete. See [facility] admission navigator for allergy information, prior to admission medications and immunization status.     PTA meds completed by pre-admitting nurse Jennifer Reaves and reviewed by pharmacy       Prior to Admission medications    Medication Sig Last Dose Taking? Auth Provider   ACETAMINOPHEN PO Take 1,000 mg by mouth every 6 hours as needed for pain Past Week at Unknown time Yes Reported, Patient   METOPROLOL TARTRATE PO Take 25 mg by mouth 2 times daily  6/11/2018 at Unknown time Yes Reported, Patient   polyethylene glycol 400 (BLINK TEARS) 0.25 % SOLN ophthalmic solution Place 1 drop into both eyes 2 times daily as needed for dry eyes 6/11/2018 at Unknown time Yes Reported, Patient   VITAMIN D, CHOLECALCIFEROL, PO Take 2,000 Units by mouth daily 6/11/2018 at Unknown time Yes Reported, Patient   WARFARIN SODIUM PO Take by mouth daily 2.5 mg on Tues, Friday. 5 mg on all other days. Past Week at Unknown time Yes Reported, Patient

## 2018-06-12 NOTE — OP NOTE
Procedure Date: 06/12/2018      PREOPERATIVE DIAGNOSES:   1.  Comminuted intraarticular supracondylar humerus fracture, left elbow.   2.  Displaced olecranon fracture, left elbow.      POSTOPERATIVE DIAGNOSES:   1.  Comminuted intraarticular supracondylar humerus fracture, left elbow.   2.  Displaced olecranon fracture, left elbow.      PROCEDURES PERFORMED:   1.  Left total elbow arthroplasty using the Jeanne Coonrad/Morrey total elbow with a size small cemented ulna and a small cemented humerus.   2.  Open reduction internal fixation olecranon fracture using a tension band technique.   3.  Subcutaneous transposition ulnar nerve, left elbow.      SURGEON:  Laci Panchal MD      FIRST ASSISTANT:  Cassandra Crump PA-C.  Skilled first assistant was necessary throughout all portions of this case in order to assist with patient positioning, retraction, holding of power instruments, wound closure, dressing and splint application.      ESTIMATED BLOOD LOSS:  50 mL.      TOURNIQUET TIME:  120 minutes.      COMPLICATIONS:  None noted.      PREOPERATIVE ANTIBIOTICS:  Ancef 2 grams IV with 1 gram of tranexamic acid.        ANESTHESIA:  General with an axillary block.      INDICATIONS:  The patient is a 89-year-old woman who lives independently.  She fell almost 2 weeks ago now and had sustained a comminuted fracture of her distal humerus as well as a displaced olecranon fracture.  Preoperative imaging with a CT scan demonstrated significant intra-articular comminution of the supracondylar humerus fracture with coronal shear fragments.  When she presented to me, her skin was quite swollen and ecchymotic due to having been on warfarin.  She had just stopped it the day she came to see me.  I discussed with her operative versus nonoperative treatment.  I felt she would best be served by total elbow arthroplasty.  We would fix the olecranon fracture at the same time.  The surgery, potential risks, benefits, complications as well  as expected postop course and recovery were discussed in detail.  She was placed in a splint and allowed her INR to drift down, holding her Coumadin.  INR this morning was 1.22.      DESCRIPTION OF PROCEDURE:  The patient was taken to the preoperative area where axillary block was placed in the left upper extremity by the anesthesiologist.  She was then taken to the operating room and general anesthetic was obtained.  She was placed supine on the operative table on a full body beanbag.  A Scruggs catheter was placed.  The left elbow was prepped and draped in sterile fashion.  The blisters that she had had previously had actually resolved.  Her skin looked quite good.  It was still indurated, but there was no erythema or active blistering.  Sterile tourniquet was used.  A timeout was performed confirming the left elbow was the operative elbow and the arm was elevated and exsanguinated, tourniquet inflated to 250 mmHg.        I made a midline posterior incision slightly off the medial aspect, at least 12 cm above and below the tip of the olecranon.  I was very careful to elevate just enough of the full-thickness skin to get around to the medial and lateral sides.  The medial side was explored first.  The ulnar nerve was identified along the medial border of the triceps.  It was then dissected free.  A Penrose drain was placed around it.  As I came down around the medial epicondyle, it was very adherent to the underlying cubital tunnel.  With a great deal of care, I freed it up down to between the 2 heads of the flexor carpi ulnaris and was able to transpose it anteriorly.  The medial column actually was intact.  The olecranon fracture was debrided and the triceps was released on either side and the tip of the olecranon with the triceps was elevated proximally.  Laterally, a lateral column approach was performed and when I got to the distal lateral epicondyle, the fracture was quite comminuted, particularly with coronal  fragments.  These fragments were removed.  I released the extensor carpi ulnaris and extensor carpi radialis off the ulna, exposing the posterior border of the ulna.  Releasing the anterior capsule allowed me to displace the ulna medially and gave excellent exposure of both the ulna and the distal humerus while leaving the medial sleeve attached.  I then prepared the ulna to accept a size small ulnar stem and I prepared the humerus in a similar fashion.  I coupled them together and the elbow came out to full extension and flexed to over 140.  I made sure that the remaining coronoid did not impinge.  I irrigated with dilute Betadine solution followed by pulse antibiotic saline.  I then provisionally fixed the olecranon, making sure that I could restore it to the ulna and of course bur it out a little bit on the undersurface so that it would come around the ulnar component.  Two K-wires were then placed down along the ulnar component.  These were then backed out once I made sure that I could reduce it.  The two 18-gauge wires were passed underneath the K-wires and this was just retracted proximally.  Once the cement restrictors were placed in the ulna and humerus, I irrigated with pulsed antibiotic saline.  I then cemented the real components using tobramycin impregnated cement, making sure any excess cement was removed.  I placed a bony fragment underneath the anterior flange with good cortical contact.  I connected the 2 components together and then made sure that she came out to full extension.  While the cement was curing, I then reduced the olecranon fragment, placed the K-wires up along the ulnar component and then passed the tension band wires in a figure-of-eight fashion through drill holes dorsal to the ulnar component.  This nicely reapproximated the component.  When I was happy with the tension band wires, they were cut and bent along the ulna.  I then cut, bent and impacted the 2 K-wires underneath the  triceps which was closed over it with 0 Vicryl suture.  When the olecranon was restored, I irrigated once again.  I placed 2 grams of vancomycin in the subcutaneous tissue.        I then closed the medial flexor pronator, transposing the ulnar nerve anteriorly, making sure that the ulnar nerve was not on any of the wires.  I then closed the dorsal fascia with running 0 Stratafix suture for a watertight closure, although it met laterally.  When I was completed, she could extend to maybe 10-15 degrees shy of full extension.  She could flex to at least 125 or 130.  She was moved smoothly.  Her triceps was tight.  We will go slow postoperatively and protect her olecranon repair.  The ulnar nerve was transposed anteriorly.  A stay suture was placed through the medial epicondyle.  There was no undue tension on the nerve.  Skin was closed in layers with interrupted 0 Vicryl, plus in the deep subcutaneous tissue 2-0 Vicryl, plus 3-0 Stratafix plus staples and then Xeroform and a soft, bulky compressive dressing with a volar plaster splint with the arm in near full extension.  She tolerated the procedure well.  The postop plan will be to admit her to the hospital overnight and keep her arm elevated.  She will receive 24 hours of IV antibiotics and I would anticipate that due to her age and other disabilities, would stay in the hospital at least 1 night, perhaps 2.         ADONIS SIGALA MD             D: 2018   T: 2018   MT: MARRY      Name:     THELMA BONNER   MRN:      [id number]        Account:        [id number]   :      1924           Procedure Date: 2018      Document: [id number]

## 2018-06-12 NOTE — ANESTHESIA CARE TRANSFER NOTE
Patient: Tameka Andersen    Procedure(s):  left total elbow arthroplasty  - Wound Class: I-Clean    Diagnosis: Left elbow fracture  Diagnosis Additional Information: Left elbow fracture    Anesthesia Type:   General, Periph. Nerve Block for postop pain, LMA     Note:  Airway :Face Mask  Patient transferred to:PACU  Handoff Report: Identifed the Patient, Identified the Reponsible Provider, Reviewed the pertinent medical history, Discussed the surgical course, Reviewed Intra-OP anesthesia mangement and issues during anesthesia, Set expectations for post-procedure period and Allowed opportunity for questions and acknowledgement of understanding      Vitals: (Last set prior to Anesthesia Care Transfer)    CRNA VITALS  6/12/2018 1343 - 6/12/2018 1420      6/12/2018             Pulse: 82    SpO2: 99 %                Electronically Signed By: RADHA Lam CRNA  June 12, 2018  2:20 PM

## 2018-06-12 NOTE — IP AVS SNAPSHOT
[facility]    [address]    [facility] [address]    Phone:  [phone]    Fax:  [phone]                                       After Visit Summary   6/12/2018    Tameka Andersen    MRN: [id number]           After Visit Summary Signature Page     I have received my discharge instructions, and my questions have been answered. I have discussed any challenges I see with this plan with the nurse or doctor.    ..........................................................................................................................................  Patient/Patient Representative Signature      ..........................................................................................................................................  Patient Representative Print Name and Relationship to Patient    ..................................................               ................................................  Date                                            Time    ..........................................................................................................................................  Reviewed by Signature/Title    ...................................................              ..............................................  Date                                                            Time

## 2018-06-12 NOTE — ANESTHESIA PROCEDURE NOTES
Peripheral nerve/Neuraxial procedure note : Brachial plexus (axillary approach)  Pre-Procedure  Performed by GABRIELA KUNZ  Location: pre-op    Procedure Times:6/12/2018 9:39 AM and 6/12/2018 9:50 AM  Pre-Anesthestic Checklist: patient identified, IV checked, site marked, risks and benefits discussed, informed consent, monitors and equipment checked, pre-op evaluation, at physician/surgeon's request and post-op pain management    Timeout  Correct Patient: Yes   Correct Procedure: Yes   Correct Site: Yes   Correct Laterality: Yes   Correct Position: Yes   Site Marked: Yes   .   Procedure Documentation    .    Procedure:  left  Brachial plexus (axillary approach).  Local skin infiltrated with 0.2 mL of 1% lidocaine.     Ultrasound used to identify targeted nerve, plexus, or vascular marker and placed a needle adjacent to it., Ultrasound was used to visualize the spread of the anesthetic in close proximity to the above stated nerve.   Patient Prep;mask, sterile gloves, povidone-iodine 7.5% surgical scrub.  .  Needle: insulated Needle Gauge: 22.    Needle Length (Inches) 2  Insertion Method: Single Shot.       Assessment/Narrative  Paresthesias: No.  Injection made incrementally with aspirations every 3 mL..  The placement was negative for: blood aspirated, painful injection and site bleeding.  Bolus given via needle..   Secured via.   Complications: none. Comments:  20 ml 0.5% bupivicvaine placed.

## 2018-06-12 NOTE — IP AVS SNAPSHOT
MRN:[id number]                      After Visit Summary   6/12/2018    Tameka Andersen    MRN: [id number]           Thank you!     Thank you for choosing [facility] for your care. Our goal is always to provide you with excellent care. Hearing back from our patients is one way we can continue to improve our services. Please take a few minutes to complete the written survey that you may receive in the mail after you visit. If you would like to speak to someone directly about your visit please contact Patient Relations at [phone]. Thank you!          Patient Information     Date Of Birth          1/7/1924        Designated Caregiver       Most Recent Value    Caregiver    Will someone help with your care after discharge? no [will arrange home care]      About your hospital stay     You were admitted on:  June 12, 2018 You last received care in the:  [facility]    You were discharged on:  Maty 15, 2018        Reason for your hospital stay       Left elbow fracture s/p TEA                  Who to Call     For medical emergencies, please call 911.  For non-urgent questions about your medical care, please call your primary care provider or clinic, [phone]  For questions related to your surgery, please call your surgery clinic        Attending Provider     Provider Specialty    Laci Panchal MD Orthopedics       Primary Care Provider Office Phone # Fax #    Markie Becker [phone]      After Care Instructions     Activity - Up with nursing assistance           Advance Diet as Tolerated       Follow this diet upon discharge: Regular            General info for SNF       Length of Stay Estimate: Short Term Care: Estimated # of Days <30  Condition at Discharge: Improving  Level of care:skilled   Rehabilitation Potential: Good  Admission H&P remains valid and up-to-date: Yes  Recent Chemotherapy: N/A  Use Nursing Home Standing Orders: Yes             General info for SNF       Length of Stay Estimate: Short Term Care: Estimated # of Days <30  Condition at Discharge: Improving  Level of care:skilled   Rehabilitation Potential: Excellent  Admission H&P remains valid and up-to-date: Yes  Recent Chemotherapy: N/A  Use Nursing Home Standing Orders: Yes            Intake and output       Every shift            Mantoux instructions       Give two-step Mantoux (PPD) Per Facility Policy Yes            Mantoux instructions       Give two-step Mantoux (PPD) Per Facility Policy Yes            Weight bearing status       Non-weight bearing to left upper extremity            Wound care (specify)       Site:   Left arm  Instructions:  Leave splint and dressings intact                  Follow-up Appointments     Follow Up and recommended labs and tests       Ideally, follow up tomorrow, 6/15, with Dr. Panchal and team at [place] office for splint removal and wound check            Follow Up and recommended labs and tests       Follow up on 6/26 at 2:00p at the [facility] office with Cassandra rCump PA-C. You will have an appointment to follow for a different splint.                  Additional Services     Occupational Therapy Adult Consult       Evaluate and treat as clinically indicated.    Reason:  Nothing to left shoulder, AAROM to left hand/wrist            Physical Therapy Adult Consult       Evaluate and treat as clinically indicated.    Reason:  Nothing for left arm, just gait training/conditioning                  Future tests that were ordered for you     Pneumatic Compression Device        Bilateral calf. Remove 30 mins BID.                  Further instructions from your care team       Keep splint clean and dry, cover with waterproof bag for showering.     Wear sling when out of bed for comfort.     See attachments for issues that you should seek medical treatment for.    Pending Results     No orders found from 6/10/2018 to 6/13/2018.           "  Statement of Approval     Ordered          06/15/18 8408  I have reviewed and agree with all the recommendations and orders detailed in this document.  EFFECTIVE NOW     Approved and electronically signed by:  Cassandra Crump PA-C           06/14/18 1203  I have reviewed and agree with all the recommendations and orders detailed in this document.  EFFECTIVE NOW     Approved and electronically signed by:  Cassandra Crump PA-C             Admission Information     Date & Time Provider Department Dept. Phone    6/12/2018 Laci Panchal MD [facility] [phone]      Your Vitals Were     Blood Pressure Temperature Respirations Height Weight Pulse Oximetry    109/60 97.9  F (36.6  C) 16 1.549 m (5' 1\") 67.1 kg (148 lb) 92%    BMI (Body Mass Index)                   27.96 kg/m2           Care EveryWhere ID     This is your Care EveryWhere ID. This could be used by other organizations to access your [facility] medical records  [id number]        Equal Access to Services     RAMESH GOLDEN AH: Hadii liberty quintero hadasho Sojose armandoali, waaxda luqadaha, qaybta kaalmada adeegyada, neal benson . So [facility] [place] [phone].    ATENCIÓN: Si habla español, tiene a gardner disposición servicios gratuitos de asistencia lingüística. Llame al [phone].    We comply with applicable federal civil rights laws and [place] laws. We do not discriminate on the basis of race, color, national origin, age, disability, sex, sexual orientation, or gender identity.               Review of your medicines      START taking        Dose / Directions    docusate sodium 100 MG capsule   Commonly known as:  COLACE   Notes to Patient:  bm 6/15 and one 6/14        Dose:  100 mg   Take 1 capsule (100 mg) by mouth 2 times daily   Quantity:  30 capsule   Refills:  0       HYDROmorphone 2 MG tablet   Commonly known as:  DILAUDID        Dose:  2-4 mg   Take 1-2 tablets (2-4 mg) by mouth every 4 hours as needed for " other (pain control or improvement in physical function. Hold dose for analgesic side effects.)   Quantity:  30 tablet   Refills:  0       hydrOXYzine 10 MG tablet   Commonly known as:  ATARAX        Dose:  10 mg   Take 1 tablet (10 mg) by mouth every 6 hours as needed for itching   Quantity:  45 tablet   Refills:  0         CONTINUE these medicines which have NOT CHANGED        Dose / Directions    ACETAMINOPHEN PO        Dose:  1000 mg   Take 1,000 mg by mouth every 6 hours as needed for pain   Refills:  0       METOPROLOL TARTRATE PO        Dose:  25 mg   Take 25 mg by mouth 2 times daily   Refills:  0       polyethylene glycol 400 0.25 % Soln ophthalmic solution   Commonly known as:  BLINK TEARS        Dose:  1 drop   Place 1 drop into both eyes 2 times daily as needed for dry eyes   Refills:  0       VITAMIN D (CHOLECALCIFEROL) PO        Dose:  2000 Units   Take 2,000 Units by mouth daily   Refills:  0       WARFARIN SODIUM PO        Take by mouth daily 2.5 mg on Tues, Friday. 5 mg on all other days.   Refills:  0            Where to get your medicines      These medications were sent to [facility] [address] [address] [address]     Phone:  [phone]     docusate sodium 100 MG capsule    hydrOXYzine 10 MG tablet         Some of these will need a paper prescription and others can be bought over the counter. Ask your nurse if you have questions.     Bring a paper prescription for each of these medications     HYDROmorphone 2 MG tablet                Protect others around you: Learn how to safely use, store and throw away your medicines at www.disposemymeds.org.        Information about OPIOIDS     PRESCRIPTION OPIOIDS: WHAT YOU NEED TO KNOW   We gave you an opioid (narcotic) pain medicine. It is important to manage your pain, but opioids are not always the best choice. You should first try all the other options your care team gave you.  Take this medicine for as short a time (and as few doses) as possible.     These medicines have risks:    DO NOT drive when on new or higher doses of pain medicine. These medicines can affect your alertness and reaction times, and you could be arrested for driving under the influence (DUI). If you need to use opioids long-term, talk to your care team about driving.    DO NOT operate heave machinery    DO NOT do any other dangerous activities while taking these medicines.     DO NOT drink any alcohol while taking these medicines.      If the opioid prescribed includes acetaminophen, DO NOT take with any other medicines that contain acetaminophen. Read all labels carefully. Look for the word  acetaminophen  or  Tylenol.  Ask your pharmacist if you have questions or are unsure.    You can get addicted to pain medicines, especially if you have a history of addiction (chemical, alcohol or substance dependence). Talk to your care team about ways to reduce this risk.    Store your pills in a secure place, locked if possible. We will not replace any lost or stolen medicine. If you don t finish your medicine, please throw away (dispose) as directed by your pharmacist. The [place] Pollution Control Agency has more information about safe disposal: https://www.pca.[facility].[place].us/living-green/managing-unwanted-medications.     All opioids tend to cause constipation. Drink plenty of water and eat foods that have a lot of fiber, such as fruits, vegetables, prune juice, apple juice and high-fiber cereal. Take a laxative (Miralax, milk of magnesia, Colace, Senna) if you don t move your bowels at least every other day.              Medication List: This is a list of all your medications and when to take them. Check marks below indicate your daily home schedule. Keep this list as a reference.      Medications           Morning Afternoon Evening Bedtime As Needed    ACETAMINOPHEN PO   Take 1,000 mg by mouth every 6 hours as needed for  pain   Last time this was given:  975 mg on 6/15/2018  3:30 AM   Last time this was given:  Had 975mg at 1200                                   docusate sodium 100 MG capsule   Commonly known as:  COLACE   Take 1 capsule (100 mg) by mouth 2 times daily   Last time this was given:  100 mg on 6/14/2018  8:18 PM   Notes to Patient:  bm 6/15 and one 6/14                                         HYDROmorphone 2 MG tablet   Commonly known as:  DILAUDID   Take 1-2 tablets (2-4 mg) by mouth every 4 hours as needed for other (pain control or improvement in physical function. Hold dose for analgesic side effects.)   Last time this was given:  4 mg on 6/14/2018  1:17 AM                                   hydrOXYzine 10 MG tablet   Commonly known as:  ATARAX   Take 1 tablet (10 mg) by mouth every 6 hours as needed for itching   Last time this was given:  10 mg on 6/13/2018 12:02 PM                                METOPROLOL TARTRATE PO   Take 25 mg by mouth 2 times daily   Last time this was given:  25 mg on 6/15/2018  7:51 AM   Next Dose Due:  saturday                                   polyethylene glycol 400 0.25 % Soln ophthalmic solution   Commonly known as:  BLINK TEARS   Place 1 drop into both eyes 2 times daily as needed for dry eyes                                   VITAMIN D (CHOLECALCIFEROL) PO   Take 2,000 Units by mouth daily   Next Dose Due:  Saturday                                     WARFARIN SODIUM PO   Take by mouth daily 2.5 mg on Tues, Friday. 5 mg on all other days.   Last time this was given:  5 mg on 6/14/2018  5:58 PM                                             More Information        Discharge Instructions: Caring for Your Splint  You will be going home with a splint. This is sometimes called a removable cast. A splint helps your body heal by holding your injured bones or joints in place. Take good care of your splint. A damaged splint can keep your injury from healing well. If your splint becomes damaged  or loses its shape, you may need to replace it.   You have a broken ___________________ bone.  This bone is located in your ____________.   Home care    Wear your splint according to your doctor s instructions.    Keep the splint dry at all times. Bathe with your splint well out of the water. You can hold the splint outside the tub or shower when bathing. Protect it with a large plastic bag closed at the top end with a rubber band. Use two layers of plastic to help keep the splint dry. Or you can buy a waterproof shield.    If a splint gets wet, dry it with a hair dryer on the  cool  setting. Don t use the warm or hot setting, because those settings can burn your skin.    Always keep the splint clean and away from dirt.    Wash the Velcro straps and inner cloth sleeve (stockinet) with soapy water and air dry.    Keep your splint away from open flames.    Don t expose your splint to heat, space heaters, or prolonged sunlight. Excessive heat will cause the splint to change shape.    Don t cut or tear the splint.     Exercise all the nearby joints not kept still by the splint. If you have a long leg splint, exercise your hip joint and your toes. If you have an arm splint, exercise your shoulder, elbow, thumb, and fingers.    Elevate the part of your body that is in the splint. This helps reduce swelling.  Follow-up care  Make a follow-up appointment with your healthcare provider, or as advised.  When to call your healthcare provider  Call your healthcare provider right away if you have any of these:    Tingling or numbness in the affected area    Severe pain that cannot be relieved with medicine    Cast that feels too tight or too loose    Swelling, coldness, or blue-gray color in the fingers or toes    Cast that is damaged, cracked, or has rough edges that hurt    Pressure sores or red marks that don t go away within 1 hour after removing the splint    Blisters   Date Last Reviewed: 7/1/2016 2000-2017 [employer]. [address] [place] [address]. All rights reserved. This information is not intended as a substitute for professional medical care. Always follow your healthcare professional's instructions.                Having Elbow Fracture Open Reduction and Internal Fixation (ORIF)  Open reduction and internal fixation (ORIF) is a type of treatment to fix a broken bone. It puts the pieces of a broken bone back together so they can heal. Open reduction means the bones are put back in place during a surgery. Internal fixation means that special hardware is used to hold the bone pieces together. This helps the bones heal correctly. The procedure is done by an orthopedic surgeon. This is a doctor with special training in treating bone, joint, and muscle problems.  What to tell your healthcare provider  Tell your healthcare provider about all the medicines you take. This includes over-the-counter medicines such as ibuprofen. It also includes vitamins, herbs, and other supplements. Also, tell the provider the last time you had something to eat or drink. And tell your healthcare provider if you:    Have had any recent changes in your health, such as an infection or fever    Are sensitive or allergic to any medicines, latex, or tape    Are sensitive or allergic to anesthesia,  the numbing drugs used to put you to sleep during surgery or to numb the surgery area    Are pregnant or think you may be pregnant  Tests before your surgery  You may have an X-ray or a CT scan to look at your arm.  Getting ready for your surgery  ORIF often takes place as emergency surgery after an accident or injury. Before this procedure, a healthcare provider will ask about your health history and give you a physical exam.  In some cases, elbow fracture ORIF is planned. Talk with your healthcare provider about how to prepare for your surgery. You may need to stop taking some medicines before the procedure, such as blood thinners and  aspirin. If you smoke, you may need to stop before your surgery. Smoking can delay healing. Talk with your healthcare provider if you need help to stop smoking.  Also, make sure to:    Ask a family member or friend to take you home from the hospital. You cannot drive yourself.    Plan some changes at home to help you recover. You may need help at home after the surgery.    Not eat or drink after midnight the night before your surgery.    Follow all other instructions from your healthcare provider.  You may be asked to sign a consent form that gives your permission to do the surgery. Read the form carefully. Ask questions if something is not clear.  On the day of surgery  Your surgeon will explain the details of your surgery. These details will depend on where your injury is and how bad it is. An orthopedic surgeon and a team of specialized nurses will do the surgery. The preparation and surgery may take a couple of hours. In general, you can expect the following:    You will likely have general anesthesia.This will prevent pain and make you sleep through the surgery. Or you may have local anesthesia to numb the area and medicine to help you relax and sleep through the surgery.    A healthcare provider watches your vital signs, like your heart rate and blood pressure, during the surgery.    After cleaning the skin, your surgeon will make a cut (incision) through the skin and muscle around your elbow.    The surgeon will put the pieces of your broken elbow bones back into place (reduction).    The surgeon will secure the pieces of the broken bones to each other (fixation). He or she may use screws, metal plates, wires, or pins.    The surgeon will make other repairs to the area as needed.    The surgeon will close the layers of muscle and skin around your elbow with stitches (sutures).  After your surgery  Talk to your surgeon about what you can expect after your surgery. You may be able to go home the same day. Or  you may need to stay in the hospital overnight. Before leaving the hospital, you will likely have X-rays taken of your arm. This is to check the repair.  You will have some pain after the surgery. Your doctor will tell you what pain medicine you can take to help reduce the pain. Avoid certain over-the-counter medicines for pain as instructed. Some of these may interfere with bone healing. You can also use ice packs to help lessen pain and swelling.  For a while after your surgery, you must be careful not to move your arm at all. Often, this means you'll need to wear a splint for several weeks. A splint will help you keep your arm from moving. Make sure keep your splint does not get wet. Your healthcare provider will tell you when it is safe to move your arm again.  Your surgeon may also tell you to eat foods high in calcium and vitamin D to help with bone healing. You may need to take medicine called a blood thinner for a little while after your surgery. Blood thinners stop blood from clotting or clumping together. Follow all your doctor s instructions carefully.  Follow-up care  Make sure to go to all of your follow-up visits. You may need to have your stitches removed a week or so after your surgery.  You may have physical therapy to improve the strength and movement of your arm. The therapy may include treatments and exercises. The therapy improves your chances of a full recovery. Most people are able to return to all their normal activities within a few months.  When to call your healthcare provider  Call your healthcare provider if you have any of the following:    Fever of 100.4 F (38 C) or higher    Redness, swelling, or fluid leaking from your incision that gets worse    Pain in your arm that gets worse    Loss of feeling in your arm or hand   Date Last Reviewed: 8/10/2015    9865-1841 [employer]). [address] [place] [address], [place] [address]. All rights reserved. This information is not intended  as a substitute for professional medical care. Always follow your healthcare professional's instructions.                What to Know When Taking Warfarin  Warfarin is a medicine that controls how your blood clots. It is used to help prevent blood clots that may cause serious health problems. These problems include heart attack (myocardial infarction), stroke, a blockage in an artery or vein (thrombus), or a blood clot that travels to the lungs (pulmonary embolism).    Before you start taking this medicine, tell your healthcare provider if you have any of these conditions:    Stomach ulcer now or in the past    Vomited blood or had bloody stools (black or red color)    Aneurysm, pericarditis, or pericardial effusion    Blood disorder    Recent surgery, stroke, mini-stroke, or spinal puncture    Kidney or liver disease, uncontrolled high blood pressure, diabetes, vasculitis, heart failure, lupus or other collagen-vascular disease, or high cholesterol    You are pregnant or breastfeeding    You are younger than 18 years old    Recent or planned dental procedure  Although warfarin reduces the risk for blood clots, it increases your risk of bleeding. Because of this, you must take the medicine exactly as you are told by your healthcare provider.   You will have blood tests often to check the level of warfarin in your blood. It is important to have just the right amount to balance preventing blood clots and causing excessive bleeding. If the level is too low, you are at risk for developing a blood clot. If it's too high, you are at risk for bleeding. Make sure you keep all scheduled appointments for blood testing. If you don t, you will be at risk for bleeding problems. You also need to protect yourself from injury that may cause bleeding such as a fall or hitting your head.  Follow these tips    Take this medicine at the same time each day. Take it with a full glass of water, with or without food.    Make sure you know  what to do if you miss a dose. If you are not sure what to do, call your healthcare provider or pharmacist. Don't take more warfarin than you were told to.    Tell all of your providers including your dentist that you take warfarin. If you will be taking warfarin for quite a while, carry an ID card or get a Medic-Alert bracelet. This will alert medical staff in case you aren t able to do so yourself. Also carry with you the name and number of the person to contact in case of an emergency.    Keep your appointments for regular blood tests to measure the effects of warfarin. Your healthcare provider may need to change your dose based on the results of your blood test. Follow your provider s advice exactly about how to take this medicine.    Don't stop taking the medicine without talking with your provider.     Monitoring your PT/INR blood levels  You will need to have a blood test called a PT/INR regularly. PT stands for pro thrombin. INR stands for international normalized ratio. The PT/INR blood test is done to make sure you are getting the right dose of this medicine. The PT/INR test tells your healthcare provider how your blood is clotting. Prothrombin time, commonly referred to as pro time or PT, measures the time it takes for blood to clot. International normalized ratio or INR is a way to compare results of the PT tests done at different labs.    Go for your blood (PT/INR) tests as often as directed. Note that diet and medicines can affect your PT/INR level.    Your next PT/INR blood draw is due on _________________ (date) at ________________ (time) by ____________________ (name of healthcare provider or clinic).    The name of the healthcare provider who is monitoring your anticoagulation therapy is ______________________ and the phone number is ___________________.    Follow up with your healthcare provider or as advised by his or her staff. It usually takes a few hours for your doctor to get the results of  your clotting tests. Call to get your lab results and find out if your provider needs to make further changes to your warfarin dose.    If your labs (PT/INR) are drawn at a location other than your provider's office, remember to tell your provider as soon as you get your lab results.      What to do at home  1. Adjust your warfarin dose as directed by your healthcare provider, ____________________ (name of healthcare provider).  2. Have your blood clotting test done every _______ days at _____________________ (name of clinic) by ____________________ (name of healthcare provider).  3. Prevent injuries and bleeding:  ? Don't go barefoot. Always wear shoes.  ? Don't trim corns or calluses yourself.  ? Use an electric razor to shave instead of a manual one.  ? Use a soft-bristled toothbrush and waxed dental floss.  4. Some medicines can affect your blood clotting. Always talk with your healthcare provider before stopping, starting, or changing any prescription or over-the-counter (OTC) medicines. This includes herbal medicines and vitamins. Talk with your healthcare provider about the following medicines:  ? Some antibiotics. This is critical because some common antibiotics can cause severe bleeding if you take them along with warfarin. These antibiotics include ciprofloxacin and trimethoprim-sulfamethoxazole.   ? Some heart medicines  ? Cimetidine  ? Aspirin or other anti-inflammatory medicines, such as ibuprofen, naproxen, ketoprofen, or other arthritis medicines  ? Some medicines for depression, cancer, HIV (protease inhibitors), diabetes, seizures, gout, high cholesterol, or thyroid replacement  ? Vitamins containing Vitamin K  ? Herbal products such as ginkgo, Q10, garlic, or Basin's wort  5. Don't make major changes in your diet. Consuming high amounts of foods containing vitamin K can reduce the effectiveness of your warfarin.      Keep your diet steady  Keep your diet pretty much the same each day. That s  because many foods contain vitamin K. Vitamin K helps your blood clot. So eating overly high amounts of foods that contain vitamin K can affect the way warfarin works. You don t need to stay away from foods that have vitamin K. But keep the amount of them you eat steady (about the same day to day). If you change your diet for any reason, such as for illness or to lose weight, tell your healthcare provider.    Foods that have vitamin K include asparagus, avocado, broccoli, cabbage, kale, spinach, and some other leafy green vegetables. Oils such as soybean, canola, and olive oils also contain vitamin K.    Other food products can affect the way warfarin works in your body:  ? Food products that may affect blood clotting include cranberries and cranberry juice, fish oil supplements, garlic, sasha, licorice, and turmeric.  ? Herbs used in herbal teas or supplements can also affect blood clotting. Keep the amount of herbal teas and supplements you use steady.  ? Alcohol can increase the effect of warfarin in your body.  Talk with your healthcare provider if you have concerns about these or other food products and their effects on warfarin.  When to call your healthcare provider  Warfarin increases your risk of bleeding. Call your healthcare provider right away before you take your next dose of warfarin if you have any of these problems:    Bleeding that doesn t stop in 10 minutes. This might be from a bloody nose or a cut, for example.    A heavier-than-normal period or bleeding between periods    Coughing or throwing up blood or something that looks like coffee grounds    Nausea, bloating, or diarrhea    Bleeding hemorrhoids    Dark red or brown urine    Red or black tarry stools    Red or black-and-blue marks on the skin that get larger    A fever or an illness that gets worse    Dizziness, headache, weakness, or fatigue    Chest pain or trouble breathing    A serious fall or a blow to the head    Swelling or pain  after an injury or at an injection site    Bleeding gums after brushing your teeth  What to watch for  If you have any of the following allergic reactions, call your doctor right away or go to the hospital.    Rash    Itching    Swelling    Trouble swallowing or breathing  [NOTE: This information topic may not include all directions, precautions, medical conditions, medicine/food interactions, and warnings for this medicine. Check with your doctor, nurse, or pharmacist for any questions that you may have.]  Date Last Reviewed: 6/1/2016 2000-2017 [employer]. [address] [place] [address]. All rights reserved. This information is not intended as a substitute for professional medical care. Always follow your healthcare professional's instructions.

## 2018-06-13 ENCOUNTER — APPOINTMENT (OUTPATIENT)
Dept: OCCUPATIONAL THERAPY | Facility: CLINIC | Age: 83
DRG: 483 | End: 2018-06-13
Attending: ORTHOPAEDIC SURGERY
Payer: MEDICARE

## 2018-06-13 LAB
GLUCOSE SERPL-MCNC: 125 MG/DL (ref 70–99)
HGB BLD-MCNC: 9.9 G/DL (ref 11.7–15.7)
INR PPP: 1.32 (ref 0.86–1.14)

## 2018-06-13 PROCEDURE — 40000133 ZZH STATISTIC OT WARD VISIT: Performed by: REHABILITATION PRACTITIONER

## 2018-06-13 PROCEDURE — 82947 ASSAY GLUCOSE BLOOD QUANT: CPT | Performed by: ORTHOPAEDIC SURGERY

## 2018-06-13 PROCEDURE — 97530 THERAPEUTIC ACTIVITIES: CPT | Mod: GO | Performed by: REHABILITATION PRACTITIONER

## 2018-06-13 PROCEDURE — 36415 COLL VENOUS BLD VENIPUNCTURE: CPT | Performed by: ORTHOPAEDIC SURGERY

## 2018-06-13 PROCEDURE — 25000128 H RX IP 250 OP 636: Performed by: ORTHOPAEDIC SURGERY

## 2018-06-13 PROCEDURE — 12000000 ZZH R&B MED SURG/OB

## 2018-06-13 PROCEDURE — 25000132 ZZH RX MED GY IP 250 OP 250 PS 637: Mod: GY | Performed by: ORTHOPAEDIC SURGERY

## 2018-06-13 PROCEDURE — 85610 PROTHROMBIN TIME: CPT | Performed by: ORTHOPAEDIC SURGERY

## 2018-06-13 PROCEDURE — 99222 1ST HOSP IP/OBS MODERATE 55: CPT | Performed by: PHYSICIAN ASSISTANT

## 2018-06-13 PROCEDURE — 97535 SELF CARE MNGMENT TRAINING: CPT | Mod: GO | Performed by: REHABILITATION PRACTITIONER

## 2018-06-13 PROCEDURE — A9270 NON-COVERED ITEM OR SERVICE: HCPCS | Mod: GY | Performed by: ORTHOPAEDIC SURGERY

## 2018-06-13 PROCEDURE — 85018 HEMOGLOBIN: CPT | Performed by: ORTHOPAEDIC SURGERY

## 2018-06-13 PROCEDURE — 99207 ZZC CONSULT E&M CHANGED TO INITIAL LEVEL: CPT | Performed by: PHYSICIAN ASSISTANT

## 2018-06-13 PROCEDURE — 97165 OT EVAL LOW COMPLEX 30 MIN: CPT | Mod: GO | Performed by: REHABILITATION PRACTITIONER

## 2018-06-13 RX ORDER — WARFARIN SODIUM 5 MG/1
5 TABLET ORAL
Status: COMPLETED | OUTPATIENT
Start: 2018-06-13 | End: 2018-06-13

## 2018-06-13 RX ADMIN — HYDROXYZINE HYDROCHLORIDE 10 MG: 10 TABLET ORAL at 12:02

## 2018-06-13 RX ADMIN — ACETAMINOPHEN 975 MG: 325 TABLET, FILM COATED ORAL at 08:03

## 2018-06-13 RX ADMIN — METOPROLOL TARTRATE 25 MG: 25 TABLET ORAL at 08:03

## 2018-06-13 RX ADMIN — CEFAZOLIN SODIUM 1 G: 1 INJECTION, SOLUTION INTRAVENOUS at 03:48

## 2018-06-13 RX ADMIN — ACETAMINOPHEN 975 MG: 325 TABLET, FILM COATED ORAL at 16:00

## 2018-06-13 RX ADMIN — GABAPENTIN 100 MG: 100 CAPSULE ORAL at 08:03

## 2018-06-13 RX ADMIN — METOPROLOL TARTRATE 25 MG: 25 TABLET ORAL at 21:04

## 2018-06-13 RX ADMIN — HYDROMORPHONE HYDROCHLORIDE 4 MG: 2 TABLET ORAL at 12:02

## 2018-06-13 RX ADMIN — DOCUSATE SODIUM 100 MG: 100 CAPSULE, LIQUID FILLED ORAL at 21:04

## 2018-06-13 RX ADMIN — WARFARIN SODIUM 5 MG: 5 TABLET ORAL at 18:13

## 2018-06-13 RX ADMIN — HYDROMORPHONE HYDROCHLORIDE 4 MG: 2 TABLET ORAL at 08:03

## 2018-06-13 RX ADMIN — SODIUM CHLORIDE, POTASSIUM CHLORIDE, SODIUM LACTATE AND CALCIUM CHLORIDE: 600; 310; 30; 20 INJECTION, SOLUTION INTRAVENOUS at 02:58

## 2018-06-13 RX ADMIN — HYDROMORPHONE HYDROCHLORIDE 4 MG: 2 TABLET ORAL at 02:50

## 2018-06-13 RX ADMIN — Medication 0.5 MG: at 02:08

## 2018-06-13 RX ADMIN — GABAPENTIN 100 MG: 100 CAPSULE ORAL at 16:00

## 2018-06-13 RX ADMIN — HYDROXYZINE HYDROCHLORIDE 10 MG: 10 TABLET ORAL at 03:59

## 2018-06-13 RX ADMIN — DOCUSATE SODIUM 100 MG: 100 CAPSULE, LIQUID FILLED ORAL at 08:03

## 2018-06-13 RX ADMIN — HYDROMORPHONE HYDROCHLORIDE 4 MG: 2 TABLET ORAL at 16:00

## 2018-06-13 RX ADMIN — ACETAMINOPHEN 975 MG: 325 TABLET, FILM COATED ORAL at 00:11

## 2018-06-13 RX ADMIN — GABAPENTIN 100 MG: 100 CAPSULE ORAL at 21:04

## 2018-06-13 ASSESSMENT — ACTIVITIES OF DAILY LIVING (ADL): PREVIOUS_RESPONSIBILITIES: MEAL PREP;HOUSEKEEPING;LAUNDRY;SHOPPING;YARDWORK;MEDICATION MANAGEMENT;FINANCES;DRIVING

## 2018-06-13 NOTE — PROGRESS NOTES
06/13/18 1100   Quick Adds   Type of Visit Initial Occupational Therapy Evaluation   Living Environment   Lives With alone   Living Arrangements house  (town house )   Number of Stairs to Enter Home 1   Number of Stairs Within Home 24   Stair Railings at Home inside, present on right side;inside, present on left side  (Right 1st 6 steps; Left 2nd 6th steps)   Transportation Available car;family or friend will provide   Living Environment Comment Pt reports 2 flights within home for accessability. Two flights to kitchen/main floor. Currently sleeps in a chair since accident; May 29th. Pt reports having friends available to help upon discharge; pt is looking to have home health for returning home. Tub shower and walk in shower options within home; no grab bars within walk in shower.    Self-Care   Dominant Hand right   Usual Activity Tolerance good   Current Activity Tolerance good   Equipment Currently Used at Home grab bar;cane, straight   Activity/Exercise/Self-Care Comment Pt reported I prior to admission    Functional Level Prior   Ambulation 0-->independent   Transferring 0-->independent   Toileting 0-->independent   Bathing 0-->independent   Dressing 0-->independent   Eating 0-->independent   Communication 0-->understands/communicates without difficulty   Swallowing 0-->swallows foods/liquids without difficulty   Cognition 0 - no cognition issues reported   Fall history within last six months yes   Number of times patient has fallen within last six months 1   Which of the above functional risks had a recent onset or change? transferring;toileting;bathing;dressing;ambulation   Prior Functional Level Comment Pt reports I prior to admission    General Information   Onset of Illness/Injury or Date of Surgery - Date 06/12/18   Referring Physician Laci Panchal MD   Patient/Family Goals Statement Home with assist; [facility]   Additional Occupational Profile Info/Pertinent History of Current Problem Pt has a history of  CKD, Atrial Fibrillation, Lung Cancer, Osteoarthritis who suffered a fall on 5/29 resulting in a left elbow fracture who is admitted s/p Left Total Elbow Arthroplasty on 6/12/2018. 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 Pt reports having support within community but is looking into more formalized assist through [facility].    Precautions/Limitations fall precautions   General Observations No documentation on WB status at time of initial evaluation; opted for conservative approach during session.    Cognitive Status Examination   Orientation orientation to person, place and time   Level of Consciousness alert   Able to Follow Commands WNL/WFL   Personal Safety (Cognitive) WNL/WFL   Memory intact   Cognitive Comment No deficits identified during initial eval    Visual Perception   Visual Perception Wears glasses   Pain Assessment   Patient Currently in Pain No   Integumentary/Edema   Integumentary/Edema Comments Observed swelling in L fingers, reports it has improved since injury date   Range of Motion (ROM)   ROM Comment Reduced L UE ROM due to recent surgery    Strength   Strength Comments Reduced L UE strength due to recent surgery   Hand Strength   Hand Strength Comments R hand intact   Muscle Tone Assessment   Muscle Tone Quick Adds No deficits were identified   Coordination   Upper Extremity Coordination Left UE impaired   Coordination Comments Pt reported having no issues with coordination in RUE.    Mobility   Bed Mobility Comments Initiated; review daily note for additional info    Transfer Skills   Transfer Comments Initiated; review daily note for additional info    Transfer Skill: Bed to Chair/Chair to Bed   Level of [place]: Bed to Chair moderate assist (50% patients effort)   Physical Assist/Nonphysical Assist: Bed to Chair 2 persons   Transfer Skill: Sit to Stand   Level of [place]:  Sit/Stand moderate assist (50% patients effort)   Physical Assist/Nonphysical Assist: Sit/Stand 2 persons   Toilet Transfer   Toilet Transfer Comments Initiated; review daily note for additional information    Transfer Skill: Toilet Transfer   Level of [place]: Toilet moderate assist (50% patients effort)  (to bedside commoe- defer to daily note for details)   Physical Assist/Nonphysical Assist: Toilet 2 persons   Toilet Transfer Skill Comments Initiated; review daily note for additional info    Balance   Balance Comments Pt did experience 1 LOB when initially standing; returned to sitting and was able to recover.    Toileting   Level of [place]: Toilet moderate assist (50% patients effort)   Physical Assist/Nonphysical Assist: Toilet 2 person assist   Assistive Device grab bars   Grooming   Level of [place]: Grooming stand-by assist   Eating/Self Feeding   Level of [place]: Eating independent   Instrumental Activities of Daily Living (IADL)   Previous Responsibilities meal prep;housekeeping;laundry;shopping;yardwork;medication management;finances;driving   IADL Comments Prior to admission; pt was I in all IADL activities    Activities of Daily Living Analysis   Impairments Contributing to Impaired Activities of Daily Living balance impaired;pain;ROM decreased;strength decreased;post surgical precautions   General Therapy Interventions   Planned Therapy Interventions ADL retraining;IADL retraining;progressive activity/exercise;transfer training   Clinical Impression   Criteria for Skilled Therapeutic Interventions Met yes, treatment indicated   OT Diagnosis Decreased I/ADL activity   Influenced by the following impairments Impaired balance, reduced strength and endurance, and pain    Assessment of Occupational Performance 5 or more Performance Deficits   Identified Performance Deficits dressing, meal prep, home mgmt, functional mobility, community mobility    Clinical Decision Making (Complexity)  "Low complexity   Therapy Frequency daily   Predicted Duration of Therapy Intervention (days/wks) 2 days    Anticipated Equipment Needs at Discharge reacher;toileting equipment  (TBD upon dc from TCU)   Anticipated Discharge Disposition Transitional Care Facility   Risks and Benefits of Treatment have been explained. Yes   Patient, Family & other staff in agreement with plan of care Yes   [facility] TM \"6 Clicks\"   2016, Trustees of [facility], under license to [email or web address].  All rights reserved.   6 Clicks Short Forms Daily Activity Inpatient Short Form   [facility]  \"6 Clicks\" Daily Activity Inpatient Short Form   1. Putting on and taking off regular lower body clothing? 2 - A Lot   2. Bathing (including washing, rinsing, drying)? 2 - A Lot   3. Toileting, which includes using toilet, bedpan or urinal? 2 - A Lot   4. Putting on and taking off regular upper body clothing? 2 - A Lot   5. Taking care of personal grooming such as brushing teeth? 4 - None   6. Eating meals? 4 - None   Daily Activity Raw Score (Score out of 24.Lower scores equate to lower levels of function) 16   Total Evaluation Time   Total Evaluation Time (Minutes) 15     "

## 2018-06-13 NOTE — PLAN OF CARE
Problem: Patient Care Overview  Goal: Plan of Care/Patient Progress Review  Outcome: Improving  Patient arrived to the floor from PACU at 1615.  A&O x 4. Patient is on 1 liter of oxygen, O2 sats running in the mid 90s. Numbness stated in the left upper extremity. Patient has left arm elevated with splint and 3 pillows when laying in bed. There is a gonsalez in place draining adequate amounts of fluid. Patient has been tolerating pain well throughout shift stating 0/10. A clear liquid diet is in place and tolerated well. The patient got to the edge of the bed to stand and use the commode with stand by assist of 2. Patient passed gas and had a small BM.

## 2018-06-13 NOTE — CONSULTS
[facility] Hospitalist Consult     Tameka Andersen MRN# [id number]   YOB: 1924 Age: 89 year old   Date of Admission: 6/12/2018  PCP is Markie Becker  Date of Service: 6/13/2018    Referring MD & Reason for Visit: I was asked by Laci Panchal MD, to manage chronic medical problems.  Internal Medicine Physician Assistant: Carol Finley         Assessment and Plan:   Tameka Andersen is a 89 year old female with a history of CKD, Atrial Fibrillation, Lung Cancer, Osteoarthritis who suffered a fall on 5/29 resulting in a left elbow fracture who is admitted s/p Left Total Elbow Arthroplasty, ORIF and subcutaneous transposition of the left ulnar nerve.     Comminuted intraarticular supracondylar humerus fracture, left elbow, Displaced olecranon fracture, left elbow s/p Left Total Elbow Arthroplasty, ORIF and subcutaneous transposition of the left ulnar nerve - POD #1. Pain not well controlled.  - will defer diet, activity, DVT ppx, and pain control to primary team.  Consider Pain Team consult     Paroxysmal Atrial Fibrillation - RRR on exam.  Continue home Metoprolol.  Pharmacy consulted for warfarin dosing.    Hx Lung Cancer - 2012 with what sounds like a left lobectomy per patient report.  No hx of chemotherapy or radiation.    Chronic Anemia - hgb today 9.9 with baseline 10-11.  Monitor    CODE: Full  Diet/IVF: Regular  DVT ppx: on warfarin pta      Carol Finley MS, PA-C  Internal Medicine Physician Assistant  [facility]  Pager: [phone]           Chief Complaint:   Left Elbow Pain         HPI:   History is obtained from the patient and medical record. This patient is a 89 year old female with a history of CKD, Atrial Fibrillation, Lung Cancer, Osteoarthritis who suffered a fall on 5/29 resulting in a left elbow fracture who is admitted s/p Left Total Elbow Arthroplasty, ORIF and subcutaneous transposition of the left ulnar nerve.  EBL 50ml.  Internal Medicine service was  "asked to see for management of chronic medical problems.     Patient reports her left elbow pain is not well controlled.  She complains of a \"burning\" pain.  Denies numbness or tingling of her left fingers.  She denies chest pain, shortness of breath, abdominal pain or nausea.  She has not eaten food so far today.             Past Medical History:     Past Medical History:   Diagnosis Date     A-fib (H)      Anemia      Arthritis     osteoarthritis     Cancer (H)     lung          Past Surgical History:     Past Surgical History:   Procedure Laterality Date     CHOLECYSTECTOMY       ENT SURGERY      tonsillectomy     GYN SURGERY      hysterectomy     LUNG SURGERY            Social History:     Social History     Social History     Marital status:      Spouse name: N/A     Number of children: N/A     Years of education: N/A     Occupational History     Not on file.     Social History Main Topics     Smoking status: Never Smoker     Smokeless tobacco: Never Used     Alcohol use Yes      Comment: twice a month     Drug use: No     Sexual activity: Not on file     Other Topics Concern     Not on file     Social History Narrative          Family History:     Family History   Problem Relation Age of Onset     Thrombosis Mother      Other - See Comments Father      appendicitis          Allergies:      Allergies   Allergen Reactions     Thorazine [Chlorpromazine Hcl]           Medications:     Prior to Admission medications    Medication Sig Last Dose Taking? Auth Provider   ACETAMINOPHEN PO Take 1,000 mg by mouth every 6 hours as needed for pain Past Week at Unknown time Yes Reported, Patient   METOPROLOL TARTRATE PO Take 25 mg by mouth 2 times daily  6/11/2018 at Unknown time Yes Reported, Patient   polyethylene glycol 400 (BLINK TEARS) 0.25 % SOLN ophthalmic solution Place 1 drop into both eyes 2 times daily as needed for dry eyes 6/11/2018 at Unknown time Yes Reported, Patient   VITAMIN D, CHOLECALCIFEROL, PO " "Take 2,000 Units by mouth daily 6/11/2018 at Unknown time Yes Reported, Patient   WARFARIN SODIUM PO Take by mouth daily 2.5 mg on Tues, Friday. 5 mg on all other days. Past Week at Unknown time Yes Reported, Patient          Review of Systems:   A complete ROS was performed and is negative other than what is stated in the HPI.       Physical Exam:   Blood pressure 132/55, temperature 96  F (35.6  C), temperature source Oral, resp. rate 16, height 1.549 m (5' 1\"), weight 67.1 kg (148 lb), SpO2 96 %.  General: Alert, interactive, NAD, lying in bed  Chest/Resp: clear to auscultation bilaterally, no crackles or wheezes  Heart/CV: regular rate and rhythm, 3/6 systolic murmur  Abdomen/GI: Soft, nontender, nondistended. +BS.  No HSM or masses, no rebound or guarding.  Extremities/MSK: LUE in splint.  Left fingers bruised and edematous.  Slightly cool to the touch.  Sensation grossly intact.  Wiggles fingers.  Neuro: Alert & oriented x 3         Labs:   CMPNo lab results found in last 7 days.  CBCNo lab results found in last 7 days.  INR  Recent Labs  Lab 06/12/18  0921   INR 1.22*         "

## 2018-06-13 NOTE — PLAN OF CARE
Problem: Patient Care Overview  Goal: Plan of Care/Patient Progress Review  Discharge Planner OT   Patient plan for discharge: TCU  Current status:   Pt was min assist transferring to EOB. O2 levels were observed to be 94% during first transfer. Therapist provided cues regarding taking each transfer one step at a time. Pt transferred sit<>stand with mod assist x2; experienced a LOB and sat back to EOB. Upon recovery; pt was able to complete sit<>stand with mod assist x2 and remain standing. Pt transferred to commode with mod assist x2 and needed the same level of assist transferring to sitting. Pt attempted to complete self-cares I on commode x3, but did require assistance from therapist. Pt walked ~30 ft to chair with mod assist x2. Pt transferred to chair with mod assist x2 and required the same level of assist upon sitting.  Barriers to return to prior living situation: Reduced activity tolerance for I/ADLs; below baseline for functional mobility, and lives alone   Recommendations for discharge: TCU; pt would benefit receiving additional therapy to return to baseline regarding all I/ADL's.   Rationale for recommendations: Pt currently lives alone and would have mobility boundaries upon returning home; multiple flights of stairs within home. Pt does have supportive friends, but is in agreement to receiving additional help through TCU facility. Will continue with skilled OT.        Entered by: Chen Godoy 06/13/2018 4:25 PM

## 2018-06-13 NOTE — PLAN OF CARE
Problem: Patient Care Overview  Goal: Plan of Care/Patient Progress Review  Outcome: Improving  Day RN ([id number])  A/O.  Low grade temp (max 100.5), encouraged use of IS and given scheduled tylenol.  Pain controlled with oral dilaudid and vistaril.  +3 edema to fingers on L fingers,  significant bruising, able to wiggle, no N/T.  LUE elevated on pillows.  Up with A2 and gaitbelt to BSC and chair.  Voiding adequately.  Tolerating regular diet, minimal appetite.  Plan is to DC to TCU when able, SW following.  Will continue to monitor.

## 2018-06-13 NOTE — PHARMACY-ANTICOAGULATION SERVICE
Clinical Pharmacy - Warfarin Dosing Consult     Pharmacy has been consulted to manage this patient s warfarin therapy.  Indication: Atrial Fibrillation  Therapy Goal: INR 2-3  Warfarin Prior to Admission: Yes  Warfarin PTA Regimen: 2.5mg on Tue/Fri and 5mg ROW  Recent documented change in oral intake/nutrition: Unknown    INR   Date Value Ref Range Status   06/12/2018 1.22 (H) 0.86 - 1.14 Final   04/18/2017 2.32 (H) 0.86 - 1.14 Final       Recommend warfarin 2.5 mg today.  Pharmacy will monitor Tameka Andersen daily and order warfarin doses to achieve specified goal.      Please contact pharmacy as soon as possible if the warfarin needs to be held for a procedure or if the warfarin goals change.  2

## 2018-06-13 NOTE — PROGRESS NOTES
SPIRITUAL HEALTH SERVICES Progress Note  [facility]    Attempted visits four times; pt was unavailable. When I poked my head in the first time, she was eating breakfast, but was very interested in talking and asked that I return later. I will follow-up with a visit in the morning.     Roberto Carlos Raymundo   Intern  Pager [phone]

## 2018-06-13 NOTE — PLAN OF CARE
Problem: Patient Care Overview  Goal: Plan of Care/Patient Progress Review  Discharge Planner PT   Patient plan for discharge: TCU  Current status: Per discussion with OT, pt with plans to discharge to TCU. Will complete IPPT orders and defer PT to next level of care.   Barriers to return to prior living situation: Stairs, decreased activity tolerance, pain  Recommendations for discharge: TCU per OT  Rationale for recommendations: Per OT       Entered by: Tiffany Begum 06/13/2018 1:00 PM

## 2018-06-14 ENCOUNTER — APPOINTMENT (OUTPATIENT)
Dept: OCCUPATIONAL THERAPY | Facility: CLINIC | Age: 83
DRG: 483 | End: 2018-06-14
Attending: ORTHOPAEDIC SURGERY
Payer: MEDICARE

## 2018-06-14 LAB
GLUCOSE SERPL-MCNC: 109 MG/DL (ref 70–99)
HGB BLD-MCNC: 9.9 G/DL (ref 11.7–15.7)
INR PPP: 1.36 (ref 0.86–1.14)

## 2018-06-14 PROCEDURE — 36415 COLL VENOUS BLD VENIPUNCTURE: CPT | Performed by: ORTHOPAEDIC SURGERY

## 2018-06-14 PROCEDURE — 12000000 ZZH R&B MED SURG/OB

## 2018-06-14 PROCEDURE — 82947 ASSAY GLUCOSE BLOOD QUANT: CPT | Performed by: ORTHOPAEDIC SURGERY

## 2018-06-14 PROCEDURE — A9270 NON-COVERED ITEM OR SERVICE: HCPCS | Mod: GY

## 2018-06-14 PROCEDURE — 25000132 ZZH RX MED GY IP 250 OP 250 PS 637: Mod: GY | Performed by: ORTHOPAEDIC SURGERY

## 2018-06-14 PROCEDURE — 99232 SBSQ HOSP IP/OBS MODERATE 35: CPT | Performed by: INTERNAL MEDICINE

## 2018-06-14 PROCEDURE — 97530 THERAPEUTIC ACTIVITIES: CPT | Mod: GO | Performed by: STUDENT IN AN ORGANIZED HEALTH CARE EDUCATION/TRAINING PROGRAM

## 2018-06-14 PROCEDURE — 85610 PROTHROMBIN TIME: CPT | Performed by: ORTHOPAEDIC SURGERY

## 2018-06-14 PROCEDURE — A9270 NON-COVERED ITEM OR SERVICE: HCPCS | Mod: GY | Performed by: ORTHOPAEDIC SURGERY

## 2018-06-14 PROCEDURE — 97110 THERAPEUTIC EXERCISES: CPT | Mod: GO | Performed by: STUDENT IN AN ORGANIZED HEALTH CARE EDUCATION/TRAINING PROGRAM

## 2018-06-14 PROCEDURE — 85018 HEMOGLOBIN: CPT | Performed by: ORTHOPAEDIC SURGERY

## 2018-06-14 PROCEDURE — 97535 SELF CARE MNGMENT TRAINING: CPT | Mod: GO | Performed by: STUDENT IN AN ORGANIZED HEALTH CARE EDUCATION/TRAINING PROGRAM

## 2018-06-14 PROCEDURE — 25000132 ZZH RX MED GY IP 250 OP 250 PS 637: Mod: GY

## 2018-06-14 PROCEDURE — 40000133 ZZH STATISTIC OT WARD VISIT: Performed by: STUDENT IN AN ORGANIZED HEALTH CARE EDUCATION/TRAINING PROGRAM

## 2018-06-14 RX ORDER — HYDROXYZINE HYDROCHLORIDE 10 MG/1
10 TABLET, FILM COATED ORAL EVERY 6 HOURS PRN
Qty: 45 TABLET | Refills: 0 | Status: ON HOLD | OUTPATIENT
Start: 2018-06-14 | End: 2019-03-02

## 2018-06-14 RX ORDER — DOCUSATE SODIUM 100 MG/1
100 CAPSULE, LIQUID FILLED ORAL 2 TIMES DAILY
Qty: 30 CAPSULE | Refills: 0 | Status: ON HOLD | OUTPATIENT
Start: 2018-06-14 | End: 2019-03-02

## 2018-06-14 RX ORDER — WARFARIN SODIUM 5 MG/1
5 TABLET ORAL
Status: COMPLETED | OUTPATIENT
Start: 2018-06-14 | End: 2018-06-14

## 2018-06-14 RX ORDER — HYDROMORPHONE HYDROCHLORIDE 2 MG/1
2-4 TABLET ORAL EVERY 4 HOURS PRN
Qty: 30 TABLET | Refills: 0 | Status: ON HOLD | OUTPATIENT
Start: 2018-06-14 | End: 2019-03-02

## 2018-06-14 RX ADMIN — WARFARIN SODIUM 5 MG: 5 TABLET ORAL at 17:58

## 2018-06-14 RX ADMIN — METOPROLOL TARTRATE 25 MG: 25 TABLET ORAL at 20:18

## 2018-06-14 RX ADMIN — ACETAMINOPHEN 975 MG: 325 TABLET, FILM COATED ORAL at 00:20

## 2018-06-14 RX ADMIN — GABAPENTIN 100 MG: 100 CAPSULE ORAL at 13:25

## 2018-06-14 RX ADMIN — ACETAMINOPHEN 975 MG: 325 TABLET, FILM COATED ORAL at 08:30

## 2018-06-14 RX ADMIN — METOPROLOL TARTRATE 25 MG: 25 TABLET ORAL at 08:30

## 2018-06-14 RX ADMIN — DOCUSATE SODIUM 100 MG: 100 CAPSULE, LIQUID FILLED ORAL at 08:30

## 2018-06-14 RX ADMIN — GABAPENTIN 100 MG: 100 CAPSULE ORAL at 08:30

## 2018-06-14 RX ADMIN — GABAPENTIN 100 MG: 100 CAPSULE ORAL at 20:18

## 2018-06-14 RX ADMIN — DOCUSATE SODIUM 100 MG: 100 CAPSULE, LIQUID FILLED ORAL at 20:18

## 2018-06-14 RX ADMIN — HYDROMORPHONE HYDROCHLORIDE 4 MG: 2 TABLET ORAL at 01:17

## 2018-06-14 RX ADMIN — ACETAMINOPHEN 975 MG: 325 TABLET, FILM COATED ORAL at 16:13

## 2018-06-14 NOTE — PROGRESS NOTES
SPIRITUAL HEALTH SERVICES  SPIRITUAL ASSESSMENT Progress Note  [facility]    PRIMARY FOCUS:     Goals of care    Symptom/pain management    Emotional/spiritual/Confucianism distress    Support for coping    ILLNESS CIRCUMSTANCES:   Reviewed documentation. Reflective conversation shared with pt, Tameka, which integrated elements of illness, spirituality, family narratives, and scriptural reflection.    Context of Serious Illness/Symptom(s) - Tameka fractured her left elbow and is presently recovering from it; Tameka is eager to receive a splint for her arm, because her cast is very heavy; Tameka anticipates a discharge tomorrow.    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 Many of her connections are related to her involvement in Sikh.    DISTRESS:     Emotional/Existential/Relational Distress - None detected.    Spiritual/Synagogue Distress - It was important to Tameka that she hear the readings of the day (what would be read at a [employer] Mass today), which I provided.    Social/Cultural/Economic Distress - None detected; her next of kin is listed as a friend, but Tameka has many good friendships and a support network through her Sikh.    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    Spiritual Practice(s) - Prayer is an integral part of Tameka's life. After the readings and scriptural reflection, Tameka asked if I would pray the chaplet of divine mercy with her, which I was happy to do.    Emotional/Existential/Relational Connections - Tameka finds peace and armen in scriptural reflection, in the work she does at her Sikh, and in prayer.    GOALS OF CARE:    Goals of Care - Tameka is hopeful of a fast recovery and to return to her life and involvement at Sikh.    Meaning/Sense-Making -  Tameka makes sense of life through the lens of her bebeto while being very pragmatic about her condition. She is not expressing difficulty in this area.    PLAN: No further  services are anticipated at this time; SH remains available per request.      Roberto Carlos Raymundo   Intern  Pager [phone]

## 2018-06-14 NOTE — PHARMACY-ANTICOAGULATION SERVICE
Clinical Pharmacy- Warfarin Discharge Note  This patient is currently on warfarin for the treatment of Atrial fibrillation.  INR Goal= 2-3  Expected length of therapy lifetime.    Warfarin PTA Regimen: 2.5mg on Tue/Fri and 5mg ROW      Anticoagulation Dose History     Recent Dosing and Labs Latest Ref Rng & Units 4/8/2006 8/14/2008 4/18/2017 6/12/2018 6/13/2018 6/14/2018    Warfarin 2.5 mg - - - - 2.5 mg - -    Warfarin 5 mg - - - - - 5 mg -    INR 0.86 - 1.14 1.00 1.15(H) 2.32(H) 1.22(H) 1.32(H) 1.36(H)          Agree with discharge orders to continue PTA warfarin regimen.   Pt should have INR checked as normal.

## 2018-06-14 NOTE — PROGRESS NOTES
Your information has been submitted on June 14th, 2018 at 02:47:34 PM CDT. The confirmation number is [id number]

## 2018-06-14 NOTE — PROGRESS NOTES
Pt has been accepted at [facility] for tomorrow at 3 or after.  Pt states her friend will transport her.  SW will with Cassandra MARIANO at  [facility] at [phone] and she states that she will come to hospital in the am to fit pt for temporary splint.  Pt will be fitted for longer term splint at regular follow-up appt at [facility].  There is no need for pt to go to [facility] in am.  Pt and friend updated.  P:  Plan for d/c tomorrow; no anticipated SW needs

## 2018-06-14 NOTE — PROGRESS NOTES
"[facility]  Hospitalist Progress Note  Jeferson Rothman, DO 06/14/2018    Reason for Stay (Diagnosis): Left humerus fracture.         Assessment and Plan:      Summary of Stay: Tameka Andersen is a 89 year old female admitted on 6/12/2018 with left humerus fracture.  Problem List:   1. Left humerus fracture.  Status post ORIF.  Pain medications as needed.  Use incentive spirometry.  Work with therapy.  2. Paroxysmal atrial fibrillation.  Continue metoprolol and warfarin.  3. Chronic anemia.  Hemoglobin stable at 9.9.  4. Hypertension.  Metoprolol.  DVT Prophylaxis: Warfarin  Code Status: Full Code  Discharge Dispo: Per Ortho  Estimated Disch Date / # of Days until Disch: Per Ortho        Interval History (Subjective):      Having left arm pain.  Denies chest pain, shortness of breath, fevers, chills, nausea, vomiting, or diarrhea.                  Physical Exam:      Last Vital Signs:  /60 (BP Location: Right arm)  Temp 98.3  F (36.8  C) (Oral)  Resp 16  Ht 1.549 m (5' 1\")  Wt 67.1 kg (148 lb)  SpO2 95%  BMI 27.96 kg/m2    Gen:  NAD, A&Ox3.  Eyes:  PERRL, sclera anicteric.  OP:  MMM, no lesions.  Neck:  Supple.  CV:  Regular, no murmurs.  Lung:  CTA b/l, normal effort.  Ab:  +BS, soft.  Skin:  Warm, dry to touch.  No rash.  Ext:  No pitting edema LE b/l.  Left arm and wrappings that were not removed.           Medications:      All current medications were reviewed with changes reflected in problem list.         Data:      All new lab and imaging data was reviewed.   Labs:    Recent Labs  Lab 06/14/18  0636   *       Recent Labs  Lab 06/14/18  0636   HGB 9.9*      Imaging:   No results found for this or any previous visit (from the past 24 hour(s)).    "

## 2018-06-14 NOTE — PLAN OF CARE
Problem: Patient Care Overview  Goal: Plan of Care/Patient Progress Review  Outcome: Improving  DAy RN  Vss, CMS+, L hand is very bruised/pruple looking, cool to touch, bail beds are pink, good movement, Dr Panchal stated looking better than yesterday.   Minimal pain taking scheduled tylenol. Splint is WDL. Ice to arm through out shift. LUE elevated   Up with A1 and gait belt, requested to be up in the chair most of shift, up and walked in room and to and from the bathroom in between  Voiding well  Ate and drank well

## 2018-06-14 NOTE — PROGRESS NOTES
[facility]  Daily Orthopedic Post-Op Note         Assessment and Plan:    Assessment:   Post-operative day #2  Procedure: left TEA and olecranon ORIF   Doing well.  No immediate surgical complications identified.  Pain well-controlled, much better than yesterday.  Tolerating her straight arm splint with the arm elevated on several pillows and ice over it.  Lots of swelling and ecchymosis to her fingers, which she had pre-surgery and is worsened due to Coumadin use.  Pain: 4/10      Plan:   -Continue supportive and symptomatic treatment  -Start or continue physical therapy  -Pain control measures  -I will be by Friday morning to remove her straight splint and place her in a posterior splint, which will be more comfortable for her.   -Follow up with us in clinic 6/26/18 at 2:10 at [facility]     Assessment:  Stable post op left elbow TEA with olecranon ORIF  Plan:  Mobilize  Disposition: Rehab  Anticipated date of discharge: 6/15/18         Interval History:   Doing well.  Continues to improve.  Pain is well-controlled.  No fevers.                   Physical Exam:   142/60, 98.3, Data Unavailable, 16  Dressing clean, dry and intact  Calves soft and non tender  Distal neurovascular exam normal           Data:      Hemoglobin:   Hemoglobin   Date Value Ref Range Status   06/14/2018 9.9 (L) 11.7 - 15.7 g/dL Final   06/13/2018 9.9 (L) 11.7 - 15.7 g/dL Final       Cassandra Crump PA-C   [phone]

## 2018-06-14 NOTE — CONSULTS
Care Transition Initial Assessment -   Reason For Consult: discharge planning  Met with: Patient, 3 friends  Active Problems:    Elbow fracture, left       DATA  Lives With: alone  Living Arrangements: house    Transportation Available: car, family or friend will provide    ASSESSMENT  Cognitive Status:alert and oriented; able to make her own HC decisions  Concerns to be addressed: Met with pt and 3 of her close friends.  Pt lives alone currently.  She has the support of many friends but no strong family connections.   She does not have any help at home and historically has not needed any.  She still drives.  OT is recommending TCU for a short stay to help her get stronger,  More independent and off of the 02.  Referrals sent   PLAN  Financial costs for the patient includes none; does not want private room and will have friend provide transport.  Patient given options and choices for discharge Yes.  Patient/family is agreeable to the plan? YES  Patient Goals and Preferences: TCU.  Patient anticipates discharging to:  TCU .

## 2018-06-14 NOTE — PLAN OF CARE
Problem: Patient Care Overview  Goal: Plan of Care/Patient Progress Review  A/O, Temp mac 101.1 scheduled tylenol given IS encouraged. pain 4/10 dilaudid given for pain.  Sats 88% room air while sleeping.  2L NC applied. up with assist of 2 to bsc voiding adequate.  LUE elevated hand/fingers bruised edemaous.  plans to dc to tcu at discharge.

## 2018-06-14 NOTE — PLAN OF CARE
Problem: Patient Care Overview  Goal: Plan of Care/Patient Progress Review  Discharge Planner OT   Patient plan for discharge: TCU  Current status:   Pt demonstrated I transfer to EOB. Therapist discussed trialing O2 off during OT session, and nursing was in agreement. Pt was min assist x2 transferring from sit<>stand. Pt was CGA  walking to chair ~30 feet. Pt did accept therapist's hand for support during ambulation. It was observed that pt O2 levels dropped to low/mid 80s, but upon sitting in the chair, pt was able to recover to mid/upper 90s. Pt was CGA transferring sit<>stand. Pt was provided instruction on completing self-cares one-handed. Pt washed face, but needed assist when preparing to brush teeth. Pt demonstrated ability to follow instruction regarding fist pump exercises; completing them for 10 reps and doing these exercises every hour; pt was in agreement to this plan.   Barriers to return to prior living situation: Impaired balance, reduced activity tolerance for I/ADLs, reduced functional mobility.   Recommendations for discharge: TCU; pt would benefit receiving therapy to improve activity tolerance for I/ADLs to return to baseline.   Rationale for recommendations: Pt currently lives alone and reflected that having additional support would be beneficial. Pt is in agreement to discharging to TCU and has a positive outlook on this option. OT will continue services until discharge.        Entered by: Chen Godoy 06/14/2018 8:34 AM

## 2018-06-15 ENCOUNTER — APPOINTMENT (OUTPATIENT)
Dept: OCCUPATIONAL THERAPY | Facility: CLINIC | Age: 83
DRG: 483 | End: 2018-06-15
Attending: ORTHOPAEDIC SURGERY
Payer: MEDICARE

## 2018-06-15 VITALS
TEMPERATURE: 97.9 F | OXYGEN SATURATION: 92 % | RESPIRATION RATE: 16 BRPM | WEIGHT: 148 LBS | DIASTOLIC BLOOD PRESSURE: 60 MMHG | HEIGHT: 61 IN | SYSTOLIC BLOOD PRESSURE: 109 MMHG | BODY MASS INDEX: 27.94 KG/M2

## 2018-06-15 LAB — INR PPP: 1.43 (ref 0.86–1.14)

## 2018-06-15 PROCEDURE — A9270 NON-COVERED ITEM OR SERVICE: HCPCS | Mod: GY | Performed by: ORTHOPAEDIC SURGERY

## 2018-06-15 PROCEDURE — 85610 PROTHROMBIN TIME: CPT | Performed by: ORTHOPAEDIC SURGERY

## 2018-06-15 PROCEDURE — 25000132 ZZH RX MED GY IP 250 OP 250 PS 637: Mod: GY | Performed by: ORTHOPAEDIC SURGERY

## 2018-06-15 PROCEDURE — 40000133 ZZH STATISTIC OT WARD VISIT: Performed by: REHABILITATION PRACTITIONER

## 2018-06-15 PROCEDURE — 97535 SELF CARE MNGMENT TRAINING: CPT | Mod: GO | Performed by: REHABILITATION PRACTITIONER

## 2018-06-15 PROCEDURE — 36415 COLL VENOUS BLD VENIPUNCTURE: CPT | Performed by: ORTHOPAEDIC SURGERY

## 2018-06-15 PROCEDURE — 97530 THERAPEUTIC ACTIVITIES: CPT | Mod: GO | Performed by: REHABILITATION PRACTITIONER

## 2018-06-15 RX ORDER — WARFARIN SODIUM 7.5 MG/1
7.5 TABLET ORAL
Status: DISCONTINUED | OUTPATIENT
Start: 2018-06-15 | End: 2018-06-15 | Stop reason: HOSPADM

## 2018-06-15 RX ADMIN — METOPROLOL TARTRATE 25 MG: 25 TABLET ORAL at 07:51

## 2018-06-15 RX ADMIN — ACETAMINOPHEN 975 MG: 325 TABLET, FILM COATED ORAL at 03:30

## 2018-06-15 RX ADMIN — ACETAMINOPHEN 975 MG: 325 TABLET, FILM COATED ORAL at 12:20

## 2018-06-15 RX ADMIN — GABAPENTIN 100 MG: 100 CAPSULE ORAL at 07:51

## 2018-06-15 RX ADMIN — GABAPENTIN 100 MG: 100 CAPSULE ORAL at 14:22

## 2018-06-15 NOTE — PLAN OF CARE
Problem: Patient Care Overview  Goal: Plan of Care/Patient Progress Review  Discharge Planner OT   Patient plan for discharge: TCU  Current status:   Pt was able to stabilize cast to increase movement through fingers. Pt demonstrated CGA x2 with hand hold assist used from therapist for additional support and stability while transferring and through functional mobility within room. Pt tolerated standing at the sink at SBA to complete ADL cares. Pt demonstrated ability to complete g/h cares  at the sink. Pt unable to use one handed techniques to remove cap from toothpaste due to cast limitations and required assistance getting toothpaste onto toothbrush; was able to complete cares after set-up. Pt was able to wash face one-handed without assist. Pt demonstrated SBA returning to bed to provide optimum positioning for removing anterior cast for PA.   Barriers to return to prior living situation: Reduced endurance and activity tolerance for I/ADLs.   Recommendations for discharge: TCU; pt would benefit additional services to return to baseline for all I/ADL cares.   Rationale for recommendations: Pt currently lives alone and previously lead an active lifestyle independently. Pt is currently below baseline but is motivated to complete therapy to return to previous levels of independence.        Entered by: Chen Godoy 06/15/2018 1:41 PM    Occupational Therapy Discharge Summary    Reason for therapy discharge:    Discharged to transitional care facility.    Progress towards therapy goal(s). See goals on Care Plan in [facility] electronic health record for goal details.  Goals not met.  Barriers to achieving goals:   discharge from facility.    Therapy recommendation(s):    Continued therapy is recommended.  Rationale/Recommendations:  pt plans to discharge to TCU.

## 2018-06-15 NOTE — DISCHARGE INSTRUCTIONS
Keep splint clean and dry, cover with waterproof bag for showering.     Wear sling when out of bed for comfort.     See attachments for issues that you should seek medical treatment for.

## 2018-06-15 NOTE — PLAN OF CARE
Problem: Patient Care Overview  Goal: Plan of Care/Patient Progress Review  Outcome: Improving  DAy RN  Vss, CMS+, L hand and fingers are bruised and swollen , +2, pink nails beds and hand/fingers are warm today.   New splint placed today by ortho. Pain is low declined dilaudid took scheduled tylenol.   Voiding well, bm 6/14.  Ate and drank well.   Up with A1 and gait belt up to chair for meals and walked in room.  Dc to tcu today at 1530 via family to [facility] paper work given and reviewed with patient.

## 2018-06-15 NOTE — PLAN OF CARE
Problem: Patient Care Overview  Goal: Plan of Care/Patient Progress Review  A/O, VSS denies pain taking scheduled tylenol for pain ice as tolerated LUE elevated.  Up with assist of 1 voiding adequate. Plans to discharge too [facility] on Friday.

## 2018-06-15 NOTE — PLAN OF CARE
Problem: Patient Care Overview  Goal: Plan of Care/Patient Progress Review  Outcome: Adequate for Discharge Date Met: 06/15/18  Pt. discharging to TCU with transport provided by friends. Packet included with patient belongings and paper work signed.

## 2018-07-10 NOTE — DISCHARGE SUMMARY
[facility] Discharge Summary    Tameka Andersen MRN# [id number]   Age: 89 year old YOB: 1924     Date of Admission:  6/12/2018  Date of Discharge::  6/15/2018  3:14 PM  Admitting Physician:  Laci Panchal MD  Discharge Physician:  Laci Panchal MD               Admission Diagnoses:   Left supracondylar humerus fracture and olecranon fracture          Discharge Diagnosis:   Same as admission diagnosis          Procedures:   Procedure(s): Total elbow arthroplasty and olecranon open reduction internal fixation, left elbow       No other procedures performed during this admission           Medications Prior to Admission:     No prescriptions prior to admission.             Discharge Medications:     Discharge Medication List as of 6/15/2018  2:54 PM      START taking these medications    Details   docusate sodium (COLACE) 100 MG capsule Take 1 capsule (100 mg) by mouth 2 times daily, Disp-30 capsule, R-0, E-Prescribe      HYDROmorphone (DILAUDID) 2 MG tablet Take 1-2 tablets (2-4 mg) by mouth every 4 hours as needed for other (pain control or improvement in physical function. Hold dose for analgesic side effects.), Disp-30 tablet, R-0, Local Print      hydrOXYzine (ATARAX) 10 MG tablet Take 1 tablet (10 mg) by mouth every 6 hours as needed for itching, Disp-45 tablet, R-0, E-Prescribe         CONTINUE these medications which have NOT CHANGED    Details   ACETAMINOPHEN PO Take 1,000 mg by mouth every 6 hours as needed for pain, Historical      METOPROLOL TARTRATE PO Take 25 mg by mouth 2 times daily , Historical      polyethylene glycol 400 (BLINK TEARS) 0.25 % SOLN ophthalmic solution Place 1 drop into both eyes 2 times daily as needed for dry eyes, Historical      VITAMIN D, CHOLECALCIFEROL, PO Take 2,000 Units by mouth daily, Historical      WARFARIN SODIUM PO Take by mouth daily 2.5 mg on Tues, Friday. 5 mg on all other days., Historical                   Consultations:    Consultation during this admission received from internal medicine          Brief History of Illness:   Patient sustained a fall, fracturing the left elbow           Hospital Course:    Unremarkable          Discharge Instructions and Follow-Up:   Discharge diet: Regular   Discharge activity: Arm in splint. Elevate and ice   Discharge follow-up: Follow up with Dr. Laci Panchal  in 1-2 weeks.  No follow up labs or test are needed.  Call [phone] for appointment.   Wound care: Keep splint dry           Discharge Disposition:   Discharged to tCU      Attestation:  I have reviewed today's vital signs, notes, medications, labs and imaging.    Laci Panchal MD

## 2019-03-02 ENCOUNTER — APPOINTMENT (OUTPATIENT)
Dept: GENERAL RADIOLOGY | Facility: CLINIC | Age: 84
End: 2019-03-02
Attending: EMERGENCY MEDICINE
Payer: MEDICARE

## 2019-03-02 ENCOUNTER — HOSPITAL ENCOUNTER (OUTPATIENT)
Facility: CLINIC | Age: 84
Setting detail: OBSERVATION
Discharge: HOME | End: 2019-03-03
Attending: EMERGENCY MEDICINE | Admitting: INTERNAL MEDICINE
Payer: MEDICARE

## 2019-03-02 DIAGNOSIS — M62.81 GENERALIZED MUSCLE WEAKNESS: ICD-10-CM

## 2019-03-02 DIAGNOSIS — I50.9 ACUTE ON CHRONIC CONGESTIVE HEART FAILURE, UNSPECIFIED HEART FAILURE TYPE (H): ICD-10-CM

## 2019-03-02 PROBLEM — R53.1 WEAKNESS: Status: ACTIVE | Noted: 2019-03-02

## 2019-03-02 LAB
ALBUMIN UR-MCNC: NEGATIVE MG/DL
ANION GAP SERPL CALCULATED.3IONS-SCNC: 6 MMOL/L (ref 3–14)
ANISOCYTOSIS BLD QL SMEAR: SLIGHT
APPEARANCE UR: CLEAR
BASOPHILS # BLD AUTO: 0 10E9/L (ref 0–0.2)
BASOPHILS NFR BLD AUTO: 0 %
BILIRUB UR QL STRIP: NEGATIVE
BUN SERPL-MCNC: 21 MG/DL (ref 7–30)
CALCIUM SERPL-MCNC: 9.1 MG/DL (ref 8.5–10.1)
CHLORIDE SERPL-SCNC: 103 MMOL/L (ref 94–109)
CK SERPL-CCNC: 20 U/L (ref 30–225)
CO2 SERPL-SCNC: 24 MMOL/L (ref 20–32)
COLOR UR AUTO: YELLOW
CREAT SERPL-MCNC: 0.88 MG/DL (ref 0.52–1.04)
CRP SERPL-MCNC: 8.9 MG/L (ref 0–8)
DIFFERENTIAL METHOD BLD: ABNORMAL
EOSINOPHIL # BLD AUTO: 0 10E9/L (ref 0–0.7)
EOSINOPHIL NFR BLD AUTO: 0 %
ERYTHROCYTE [DISTWIDTH] IN BLOOD BY AUTOMATED COUNT: 15.9 % (ref 10–15)
ERYTHROCYTE [SEDIMENTATION RATE] IN BLOOD BY WESTERGREN METHOD: 31 MM/H (ref 0–30)
GFR SERPL CREATININE-BSD FRML MDRD: 56 ML/MIN/{1.73_M2}
GLUCOSE SERPL-MCNC: 117 MG/DL (ref 70–99)
GLUCOSE UR STRIP-MCNC: NEGATIVE MG/DL
HCT VFR BLD AUTO: 35.1 % (ref 35–47)
HGB BLD-MCNC: 11.1 G/DL (ref 11.7–15.7)
HGB UR QL STRIP: ABNORMAL
HYPOCHROMIA BLD QL: PRESENT
INR PPP: 2.86 (ref 0.86–1.14)
KETONES UR STRIP-MCNC: NEGATIVE MG/DL
LEUKOCYTE ESTERASE UR QL STRIP: NEGATIVE
LYMPHOCYTES # BLD AUTO: 0.6 10E9/L (ref 0.8–5.3)
LYMPHOCYTES NFR BLD AUTO: 9 %
MCH RBC QN AUTO: 29.8 PG (ref 26.5–33)
MCHC RBC AUTO-ENTMCNC: 31.6 G/DL (ref 31.5–36.5)
MCV RBC AUTO: 94 FL (ref 78–100)
METAMYELOCYTES # BLD: 0.1 10E9/L
METAMYELOCYTES NFR BLD MANUAL: 1 %
MONOCYTES # BLD AUTO: 1.5 10E9/L (ref 0–1.3)
MONOCYTES NFR BLD AUTO: 24.5 %
NEUTROPHILS # BLD AUTO: 4.1 10E9/L (ref 1.6–8.3)
NEUTROPHILS NFR BLD AUTO: 65.5 %
NITRATE UR QL: NEGATIVE
NT-PROBNP SERPL-MCNC: 581 PG/ML (ref 0–1800)
OVALOCYTES BLD QL SMEAR: SLIGHT
PH UR STRIP: 5 PH (ref 5–7)
PLATELET # BLD AUTO: 137 10E9/L (ref 150–450)
PLATELET # BLD EST: ABNORMAL 10*3/UL
POIKILOCYTOSIS BLD QL SMEAR: SLIGHT
POTASSIUM SERPL-SCNC: 4 MMOL/L (ref 3.4–5.3)
RBC # BLD AUTO: 3.72 10E12/L (ref 3.8–5.2)
RBC #/AREA URNS AUTO: 1 /HPF (ref 0–2)
RBC INCLUSIONS BLD: SLIGHT
SODIUM SERPL-SCNC: 133 MMOL/L (ref 133–144)
SOURCE: ABNORMAL
SP GR UR STRIP: 1 (ref 1–1.03)
TROPONIN I SERPL-MCNC: <0.015 UG/L (ref 0–0.04)
UROBILINOGEN UR STRIP-MCNC: 0 MG/DL (ref 0–2)
WBC # BLD AUTO: 6.2 10E9/L (ref 4–11)
WBC #/AREA URNS AUTO: <1 /HPF (ref 0–5)

## 2019-03-02 PROCEDURE — 81001 URINALYSIS AUTO W/SCOPE: CPT | Performed by: EMERGENCY MEDICINE

## 2019-03-02 PROCEDURE — 86140 C-REACTIVE PROTEIN: CPT | Performed by: EMERGENCY MEDICINE

## 2019-03-02 PROCEDURE — 85652 RBC SED RATE AUTOMATED: CPT | Performed by: EMERGENCY MEDICINE

## 2019-03-02 PROCEDURE — 25000128 H RX IP 250 OP 636: Performed by: EMERGENCY MEDICINE

## 2019-03-02 PROCEDURE — 99220 ZZC INITIAL OBSERVATION CARE,LEVL III: CPT | Performed by: INTERNAL MEDICINE

## 2019-03-02 PROCEDURE — 84484 ASSAY OF TROPONIN QUANT: CPT | Performed by: EMERGENCY MEDICINE

## 2019-03-02 PROCEDURE — 80048 BASIC METABOLIC PNL TOTAL CA: CPT | Performed by: EMERGENCY MEDICINE

## 2019-03-02 PROCEDURE — 25000132 ZZH RX MED GY IP 250 OP 250 PS 637: Mod: GY | Performed by: INTERNAL MEDICINE

## 2019-03-02 PROCEDURE — G0378 HOSPITAL OBSERVATION PER HR: HCPCS

## 2019-03-02 PROCEDURE — A9270 NON-COVERED ITEM OR SERVICE: HCPCS | Mod: GY | Performed by: INTERNAL MEDICINE

## 2019-03-02 PROCEDURE — 85025 COMPLETE CBC W/AUTO DIFF WBC: CPT | Performed by: EMERGENCY MEDICINE

## 2019-03-02 PROCEDURE — 83880 ASSAY OF NATRIURETIC PEPTIDE: CPT | Performed by: EMERGENCY MEDICINE

## 2019-03-02 PROCEDURE — 85610 PROTHROMBIN TIME: CPT | Performed by: EMERGENCY MEDICINE

## 2019-03-02 PROCEDURE — 99285 EMERGENCY DEPT VISIT HI MDM: CPT | Mod: 25

## 2019-03-02 PROCEDURE — 96374 THER/PROPH/DIAG INJ IV PUSH: CPT

## 2019-03-02 PROCEDURE — 93005 ELECTROCARDIOGRAM TRACING: CPT

## 2019-03-02 PROCEDURE — 82550 ASSAY OF CK (CPK): CPT | Performed by: EMERGENCY MEDICINE

## 2019-03-02 PROCEDURE — 71046 X-RAY EXAM CHEST 2 VIEWS: CPT

## 2019-03-02 RX ORDER — ACETAMINOPHEN 650 MG/1
650 SUPPOSITORY RECTAL EVERY 4 HOURS PRN
Status: DISCONTINUED | OUTPATIENT
Start: 2019-03-02 | End: 2019-03-03 | Stop reason: HOSPADM

## 2019-03-02 RX ORDER — AMOXICILLIN 250 MG
1 CAPSULE ORAL 2 TIMES DAILY PRN
Status: DISCONTINUED | OUTPATIENT
Start: 2019-03-02 | End: 2019-03-03 | Stop reason: HOSPADM

## 2019-03-02 RX ORDER — FUROSEMIDE 10 MG/ML
40 INJECTION INTRAMUSCULAR; INTRAVENOUS ONCE
Status: COMPLETED | OUTPATIENT
Start: 2019-03-02 | End: 2019-03-02

## 2019-03-02 RX ORDER — WARFARIN SODIUM 2 MG/1
4 TABLET ORAL
Status: COMPLETED | OUTPATIENT
Start: 2019-03-02 | End: 2019-03-02

## 2019-03-02 RX ORDER — AMOXICILLIN 250 MG
2 CAPSULE ORAL 2 TIMES DAILY PRN
Status: DISCONTINUED | OUTPATIENT
Start: 2019-03-02 | End: 2019-03-03 | Stop reason: HOSPADM

## 2019-03-02 RX ORDER — METOPROLOL TARTRATE 25 MG/1
25 TABLET, FILM COATED ORAL 2 TIMES DAILY
Status: DISCONTINUED | OUTPATIENT
Start: 2019-03-02 | End: 2019-03-03 | Stop reason: HOSPADM

## 2019-03-02 RX ORDER — ONDANSETRON 4 MG/1
4 TABLET, ORALLY DISINTEGRATING ORAL EVERY 6 HOURS PRN
Status: DISCONTINUED | OUTPATIENT
Start: 2019-03-02 | End: 2019-03-03 | Stop reason: HOSPADM

## 2019-03-02 RX ORDER — NALOXONE HYDROCHLORIDE 0.4 MG/ML
.1-.4 INJECTION, SOLUTION INTRAMUSCULAR; INTRAVENOUS; SUBCUTANEOUS
Status: DISCONTINUED | OUTPATIENT
Start: 2019-03-02 | End: 2019-03-03 | Stop reason: HOSPADM

## 2019-03-02 RX ORDER — WARFARIN SODIUM 5 MG/1
TABLET ORAL
COMMUNITY
End: 2019-03-14

## 2019-03-02 RX ORDER — ONDANSETRON 2 MG/ML
4 INJECTION INTRAMUSCULAR; INTRAVENOUS EVERY 6 HOURS PRN
Status: DISCONTINUED | OUTPATIENT
Start: 2019-03-02 | End: 2019-03-03 | Stop reason: HOSPADM

## 2019-03-02 RX ORDER — POLYETHYLENE GLYCOL 3350 17 G/17G
17 POWDER, FOR SOLUTION ORAL DAILY PRN
Status: DISCONTINUED | OUTPATIENT
Start: 2019-03-02 | End: 2019-03-03 | Stop reason: HOSPADM

## 2019-03-02 RX ORDER — ACETAMINOPHEN 325 MG/1
650 TABLET ORAL EVERY 4 HOURS PRN
Status: DISCONTINUED | OUTPATIENT
Start: 2019-03-02 | End: 2019-03-03 | Stop reason: HOSPADM

## 2019-03-02 RX ORDER — WARFARIN SODIUM 5 MG/1
2.5 TABLET ORAL
COMMUNITY
End: 2019-03-14

## 2019-03-02 RX ADMIN — WARFARIN SODIUM 4 MG: 2 TABLET ORAL at 17:35

## 2019-03-02 RX ADMIN — FUROSEMIDE 40 MG: 10 INJECTION, SOLUTION INTRAVENOUS at 13:33

## 2019-03-02 RX ADMIN — METOPROLOL TARTRATE 25 MG: 25 TABLET ORAL at 20:17

## 2019-03-02 ASSESSMENT — ENCOUNTER SYMPTOMS
RHINORRHEA: 1
BACK PAIN: 0
SORE THROAT: 0
ARTHRALGIAS: 0
DYSURIA: 0
APPETITE CHANGE: 1
SHORTNESS OF BREATH: 1
FEVER: 0
COUGH: 1
FATIGUE: 1
MYALGIAS: 0
WEAKNESS: 1
FREQUENCY: 0

## 2019-03-02 NOTE — H&P
[facility]  Hospitalist Admission Note  Name: Tameka Andersen    MRN: [id number]  YOB: 1924    Age: 89 year old  Date of admission: 3/2/2019  Primary care provider: Markie Becker    Chief Complaint:  Weakness, SOB    Assessment and Plan:     Tameka Andersen is a 89 year old female with PMH including atrial fibrillation (on Coumadin), lung cancer (status post wedge resection without further treatment) and thoracic compression fracture (1/2019, no longer painful) who was admitted 03/02/19 with progressive diffuse weakness and SOB.    1. SOB: Patient has had shortness of breath with exertion which has been worsening over the past month.  No cough or infectious symptoms.  Chest pain.  Chest x-ray was completed and showed a very small right-sided pleural effusion.  Her lungs are clear.  It is possible that she could have CHF although has no history of this.  She has had orthopnea for 6-8 months.  Will obtain TTE to rule this out.  It is also possible that she has generalized deconditioning, particularly with recent thoracic fracture which probably limited her mobility.  Patient did receive 1 dose of IV Lasix in the emergency room.  Hold further diuretics until TTE is obtained.  -TTE    2. Diffuse Weakness: Patient has had a slow decline in her mobility over the past several months.  This is likely exacerbated by her recent thoracic compression fracture.  No focal deficits, no concern of stroke.  No infectious symptoms.  We will have PT assess the patient.  She may need higher level of care.  - PT consult    3. Atrial Fibrillation: Patient is on metoprolol and Coumadin.  She currently is in sinus rhythm with a sinus arrhythmia.  Monitor on telemetry.  See consult dosing.    4. Recent Thoracic Compression Fracture: She now is only taking Tylenol.  Does not have pain unless she is standing too long.    5. Remote history of Lung Cancer: Diagnosed in 2012.  She had wide resection she is not  sure exactly what area.  She did not require chemotherapy or radiation.    Diet: Regular  DVT Prophylaxis: Warfarin  Scruggs Catheter: not present  Code Status: DNR/DNI - reviewed with patient at bedside    Disposition Plan   Expected discharge: less than 48 hours, recommended to TBD based no therapy recommendations once safe disposition plan/ TCU bed available.  Entered: Katie Valladares MD 03/02/2019, 1:28 PM     The patient's care was discussed with the Patient and Patient's friend.    Katie Valladares MD  [facility]        Katie Valladares MD  [facility]      History of Present Illness:  Tameka Andersen is a 89 year old female with PMH including atrial fibrillation (on Coumadin), lung cancer (status post wedge resection without further treatment) and thoracic compression fracture (1/2019, no longer painful) who was admitted 03/02/19 with progressive diffuse weakness and SOB.  History was obtained through patient interview, chart review and discussion with Dr. Wang in the ER.    Patient reports that she saw her primary care doctor on January 17 because she was having rather severe back pain.  She had an x-ray which showed thoracic compression fractures.  She reports that her provider wanted her to have a procedure to fix this but [facility] and was told that her bones are too severe to have this procedure done.  She had home health care with RN, home health aide and physical therapist.  This finished 2 weeks ago.  She was initially treated with pain medications for her compression fracture but she states the pain has now resolved.  The only time she gets pains if she stands too long at this point.    She states over the past few months she has becoming progressively weaker.  She reports that she has a very poor appetite.  She has no stamina and felt that things were worsening to the point that she needed further help and evaluation.    She states that today she  tried to change her she had her eyes and was so fatigued that she had to take rests while doing this.  She has to sit down and put her head between her knees before she feels better.  He denies dizziness.  She just feels diffusely weak.  She has not had nausea or vomiting.  No chest pain, fevers, chills, bleeding, urinary symptoms.  She did have significant constipation when she was on opiates for her thoracic back pain but this resolved when she stopped those medications.  She does get quite short of breath when she exerts herself does not have any shortness of breath at rest.  She has no cough.  She reports orthopnea but this has been present for 6-8 months (sleeps with 2 pillows).    She does have an uncomfortable feeling in her stomach.  She reports that she gets panicky feeling because of this.  She has a very difficult time describing why her abdomen feels so uncomfortable.  She denies any pain.  She has a very poor appetite.  She gets full after only a few bites.  She also has a very dry mouth.    The patient lives independently in a town home.  This month she did get help cleaning her home for the first time.  She does have friends who help her and bring meals.  Otherwise she lives independently.     Past Medical History:  Past Medical History:   Diagnosis Date     A-fib (H)      Anemia      Arthritis     osteoarthritis     Cancer (H)     lung     Past Surgical History:  Past Surgical History:   Procedure Laterality Date     ARTHROPLASTY ELBOW Left 6/12/2018    Procedure: ARTHROPLASTY ELBOW;  1.  Left total elbow arthroplasty using the Jeanne Coonrad/Morrey total elbow with a size small cemented ulna and a small cemented humerus.   2.  Open reduction internal fixation olecranon fracture using a tension band technique.   3.  Subcutaneous transposition ulnar nerve, left elbow. ;  Surgeon: Laci Panchal MD;  Location:  OR     CHOLECYSTECTOMY       ENT SURGERY      tonsillectomy     GYN SURGERY       hysterectomy     LUNG SURGERY       Social History:  Social History     Tobacco Use     Smoking status: Never Smoker     Smokeless tobacco: Never Used   Substance Use Topics     Alcohol use: Yes     Comment: twice a month     Social History     Social History Narrative    Lives alone.  No children.  Retired.   Lives independently in a town home.      Family History:  Family History   Problem Relation Age of Onset     Thrombosis Mother      Other - See Comments Father         appendicitis     Allergies:  Allergies   Allergen Reactions     Thorazine [Chlorpromazine Hcl]      Medications:  No current facility-administered medications for this encounter.      Current Outpatient Medications   Medication     ACETAMINOPHEN PO     docusate sodium (COLACE) 100 MG capsule     hydrOXYzine (ATARAX) 10 MG tablet     METOPROLOL TARTRATE PO     polyethylene glycol 400 (BLINK TEARS) 0.25 % SOLN ophthalmic solution     VITAMIN D, CHOLECALCIFEROL, PO     WARFARIN SODIUM PO      Review of Systems:  A Comprehensive greater than 10 system review of systems was carried out.  Pertinent positives and negatives are noted above.  Otherwise negative for contributory information.     Physical Exam:  Blood pressure 128/65, pulse 80, temperature 98  F (36.7  C), temperature source Oral, resp. rate 20, SpO2 95 %.  Wt Readings from Last 1 Encounters:   06/12/18 67.1 kg (148 lb)     Exam:   General: Alert, awake, no acute distress.  Appears younger than stated age.  HEENT: NC/AT, eyes anicteric and without injection, EOMI, face symmetric.  Dentition WNL, MMM.  Cardiac: RRR, normal S1, S2.  No murmurs/g/r.  No LE edema  Pulmonary: Normal chest rise, normal work of breathing.  Lungs CTAB without crackles or wheezing  Abdomen: soft, non-tender, non-distended.  Normoactive BS.  No guarding or rebound tenderness.  Extremities: no deformities.  Warm, well perfused.  Skin: no rashes or lesions noted.  Warm and Dry.  Neuro: No focal deficits noted.  Speech  clear.  Coordination and strength grossly normal.  Psych: Appropriate affect. Alert and oriented x3    Data Reviewed Today:  EKG:  Sinus arrhythmia without acute ischemic changes  Imaging:  Results for orders placed or performed during the hospital encounter of 03/02/19   XR Chest 2 Views    Narrative    CHEST TWO VIEWS  3/2/2019 11:47 AM     HISTORY:  Shortness of breath.    COMPARISON: 1/8/2012.      Impression    IMPRESSION: Small right pleural effusion was not visualized on the  previous exam. Hyperinflation both lungs. Aortic calcification. Mild  cardiomegaly has a similar appearance to the previous exam. Pulmonary  vascularity is within normal limits. No pneumothorax.     Labs:  Recent Labs   Lab 03/02/19  1120   WBC 6.2   HGB 11.1*   HCT 35.1   MCV 94   *     Recent Labs   Lab 03/02/19  1120      POTASSIUM 4.0   CHLORIDE 103   CO2 24   ANIONGAP 6   *   BUN 21   CR 0.88   GFRESTIMATED 56*   GFRESTBLACK 65   NADIA 9.1     Recent Labs   Lab 03/02/19  1120   INR 2.86*     Recent Labs   Lab 03/02/19  1120   TROPI <0.015       Katie Valladares MD  Hospitalist  [facility]

## 2019-03-02 NOTE — ED PROVIDER NOTES
History     Chief Complaint:  Generalized weakness    HPI   Tameka Andersen is a 89 year old female who is recovering from a non-traumatic compression fracture of the T6 vertebra with routine healing secondary to her osteoporosis with a history of a-fib on Coumadin who presents with generalized weakness. The patient says she has been feeling increasingly weak for the last month and a half with decreased appetite and shortness of breath. She also has new orthopnea, but no paroxysmal nocturnal dyspnea. She has a mildly productive cough and runny nose, not new for, but no other cold symptoms. She describes a full feeling in her stomach after eating only a small amount of food which is unusual for her. She otherwise denies urinary symptoms or any pain related to her T6 fracture.     Allergies:  Thorazine [Chlorpromazine Hcl]      Medications:    Hydroxyzine  Metoprolol  Coumadin  Vitamin D     Past Medical History:    Osteoporosis  A-fib  Anemia  Arthritis  Cancer  Compression fracture of T6 vertebra    Past Surgical History:    Elbow arthoplasty  Cholecystectomy  Tonsillectomy  Hysterectomy  Lung surgery     Family History:    Thrombosis  Appendicitis    Social History:  Smoking status: Never  Alcohol use: Yes  Drug use: No  Patient presents with female .  PCP: Markie Becker    Marital Status:        Review of Systems   Constitutional: Positive for appetite change and fatigue. Negative for fever.   HENT: Positive for rhinorrhea. Negative for congestion and sore throat.    Respiratory: Positive for cough and shortness of breath.    Genitourinary: Negative for dysuria and frequency.   Musculoskeletal: Negative for arthralgias, back pain and myalgias.   Neurological: Positive for weakness.   All other systems reviewed and are negative.        Physical Exam     Patient Vitals for the past 24 hrs:   BP Temp Temp src Pulse Heart Rate Resp SpO2   03/02/19 1315 128/65 -- -- 80 98 -- 95 %   03/02/19  1300 117/70 -- -- 92 86 -- 96 %   03/02/19 1245 140/66 -- -- 55 96 -- 95 %   03/02/19 1230 114/74 -- -- 97 80 -- 95 %   03/02/19 1130 111/63 -- -- 76 75 -- 94 %   03/02/19 1115 128/79 -- -- 100 -- -- 94 %   03/02/19 1103 134/66 98  F (36.7  C) Oral 96 96 20 99 %   03/02/19 1100 134/66 -- -- 96 -- -- 95 %      Physical Exam  General: Patient is alert and interactive when I enter the room  Head:  The scalp, face, and head appear normal  Eyes:  The pupils are equal, round, and reactive to light    Conjunctivae and sclerae are normal. No lid lag with superior gaze.  ENT:    External acoustic canals are normal    The oropharynx is normal without erythema.     Uvula is in the midline  Neck:  Normal range of motion  CV:  Regular rate. S1/S2. No murmurs.   Resp:  Crackles in lung bases, coarse breath sounds No distress  GI:  Abdomen is soft, no rigidity, guarding, or rebound    No distension. No tenderness to palpation in any quadrant.     MS:  Normal tone. Joints grossly normal without effusions.     No asymmetric leg swelling, calf or thigh tenderness.      Holds arms above head easily. Proximal hip muscle weakness, approx 3+/5.   Skin:  No rash or lesions noted. Normal capillary refill noted  Neuro: Speech is normal and fluent. Face is symmetric.     Moving all extremities well.   Psych:  Awake. Alert.  Normal affect.  Appropriate interactions.  Lymph: No anterior cervical lymphadenopathy noted    Emergency Department Course     ECG (11:20:02):  Rate 92 bpm. ID interval 172. QRS duration 78. QT/QTc 350/432. P-R-T axes 79 -18 -14. Sinus rhythm with marked sinus arrhythmia. Interpreted at 1120 by Roscoe Wang MD.     Imaging:  Radiographic findings were communicated with the patient and Admitting MD who voiced understanding of the findings.    X-ray Chest, 2 views:  Small right pleural effusion was not visualized on the  previous exam. Hyperinflation both lungs. Aortic calcification. Mild  cardiomegaly has a  similar appearance to the previous exam. Pulmonary  vascularity is within normal limits. No pneumothorax.  Result per radiology.     Laboratory:  CBC: HGB 11.1 (L),  (L), o/w WNL (WBC 6.2)  BMP: Glucose 117 (H), GFR 56 (L), o/w WNL (Creatinine 0.88)  CK Total: 20  CRP: 8.9 (H)  ProBNP: 581  1120 Troponin: <0.015   INR: 2.86 (H)  ESR: 31 (H)    UA: Blood small, o/w Negative     Interventions:  1333 - Lasix 40 mg IV    Emergency Department Course:  Past medical records, nursing notes, and vitals reviewed.  1101: I performed an exam of the patient and obtained history, as documented above.     IV inserted and blood drawn. This was sent to laboratory for testing, findings above. The patient provided a urine sample here in the emergency department. This was sent for laboratory testing, findings above.  The patient was sent for a chest x-ray while in the emergency department, findings above.     1203: I rechecked the patient. Explained findings to patient.     Findings and plan explained to the Patient who consents to admission.     1325: Discussed the patient with Dr. Valladares, who will admit the patient to a hospital bed for further monitoring, evaluation, and treatment.      Impression & Plan      Medical Decision Making:  Tameka Andersen is a 89 year old female who presents for evaluation of weakness.  Associated symptoms today have included dypsnea, orthopnea and on exam has crackles and new pleural effusion.  The workup here in the emergency room was to evaluate for infections, metabolic, electrolyte, neurologic, muscle or cardiovascular causes.  Of note, there are no signs of pneumonia or UTI causing the symptoms.  In addition, I do not believe symptoms are due to CVA, TIA, myasthesia gravis, myopathy or acute coronary syndromes and there are no indications at this point for a further workup with a benign history, exam and laboratory tests.  Doubt spinal pathology or other central etiology of symptoms.  They were  ambulated in ED and did poorly. Very dyspneic with minimal 5 feet of ambulation.  Will diurese and admit.  ESR is equivocal and has proximal muscle weakness; consider early polymyalgia rheumatica in differential.        Diagnosis:    ICD-10-CM    1. Acute on chronic congestive heart failure, unspecified heart failure type (H) I50.9    2. Generalized muscle weakness M62.81        Disposition:  Admitted.    Adair Sena  3/2/2019   [facility] EMERGENCY DEPARTMENT    Scribe Disclosure:  Adair VILLALPANDO Chi, am serving as a scribe at 11:01 AM on 3/2/2019 to document services personally performed by Roscoe Wang MD based on my observations and the provider's statements to me.        Roscoe Wang MD  03/02/19 [id number]

## 2019-03-02 NOTE — PHARMACY-ANTICOAGULATION SERVICE
3/2/19  Pharmacy to dose warfarin for atrial fibrillation.  Goal INR 2-3.  Home regimen: 2.5mg on Tuesdays and 5mg ROW.     Note to reorder daily/Daily INR ordered    INR = 2.86; Reduced home dose to 4mg (vs. 5mg) x1

## 2019-03-02 NOTE — PLAN OF CARE
PRIMARY DIAGNOSIS: GENERALIZED WEAKNESS    OUTPATIENT/OBSERVATION GOALS TO BE MET BEFORE DISCHARGE  1. Orthostatic performed: Yes:          Lying Orthostatic BP: 138/59         Sitting Orthostatic BP: 127/71         Standing Orthostatic BP: 115/58     2. Tolerating PO medications: Yes    3. Return to near baseline physical activity: No, still very weak    4. Cleared for discharge by consultants (if involved): No    Discharge Planner Nurse   Safe discharge environment identified: Yes  Barriers to discharge: No, not once medically stable       Entered by: Susan Sales 03/02/2019 4:11 PM    VSS stable and on room air. Patient is weak, up with SBA. Bed Alarm is on, will call to get up. Pt consulted. Lasix given in ED, patient has small pleural effusion shown on imaging. Will continue to monitor and provide cares.   Please review provider order for any additional goals.     Nurse to notify provider when observation goals have been met and patient is ready for discharge.

## 2019-03-02 NOTE — ED NOTES
Observation Brochure and Video   Patient informed of observation status based on provider's order. Observation Brochure was given and video watched.  Janet Majano RN

## 2019-03-02 NOTE — ED TRIAGE NOTES
"Patient is here with her friend, she reports generalized weakness and shortness of breath since her \"spontaneous thoracic fracture\".   "

## 2019-03-02 NOTE — PLAN OF CARE
ROOM # 229    Living Situation (if not independent, order SW consult):  Facility name:  :     Activity level at baseline: Independent  Activity level on admit: SBA      Patient registered to observation; given Patient Bill of Rights; given the opportunity to ask questions about observation status and their plan of care.  Patient has been oriented to the observation room, bathroom and call light is in place.    Discussed discharge goals and expectations with patient/family.

## 2019-03-02 NOTE — ED NOTES
"[facility]  ED Nurse Handoff Report    Tameka Andersen is a 89 year old female   ED Chief complaint: Generalized Weakness and Shortness of Breath  . ED Diagnosis:   Final diagnoses:   Acute on chronic congestive heart failure, unspecified heart failure type (H)   Generalized muscle weakness     Allergies:   Allergies   Allergen Reactions     Thorazine [Chlorpromazine Hcl]        Code Status:   Activity level - Baseline/Home:  Independent.   Activity Level - Current:   Independent, standby assist in ED   Lift room needed: No.   Bariatric: No   Needed: No   Isolation: No.   Infection: Not Applicable.     Vital Signs:   Vitals:    03/02/19 1230 03/02/19 1245 03/02/19 1300 03/02/19 1315   BP: 114/74 140/66 117/70 128/65   Pulse: 97 55 92 80   Resp:       Temp:       TempSrc:       SpO2: 95% 95% 96% 95%       Cardiac Rhythm:  ,      Pain level:    Patient confused: No.   Patient Falls Risk: Yes.   Elimination Status: Has voided   Patient Report - Initial Complaint: Patient is here with her friend, she reports generalized weakness and shortness of breath since her \"spontaneous thoracic fracture\".   Focused Assessment:  11:05 Musculoskeletal Musculoskeletal - Pain Body Location: thoracic spine LUE Extremity Movement: no overt deficits noted RUE Extremity Movement: no overt deficits noted LLE Extremity Movement: no overt deficits noted RLE Extremity Movement: no overt deficits noted LLE Weight-Bearing Status: full weight-bearing RLE Weight-Bearing Status: full weight-bearing CMS Intact: Yes Musculoskeletal Comment: patient reports spontaneous fracture of thoracic vertebra and weakness and shortness of breath worsening since that event. NOBLE      11:05 Respiratory Respiratory - Respiratory WDL: all Rhythm/Pattern, Respiratory: shortness of breath; dyspnea on exertion (with exertion) Breath Sounds: All Fields Mucous Membranes: moist Cough Frequency: infrequent  Assessment - Chest Appearance: " "kyphosis  Head To Toe Assessment - All Lung Fields Breath Sounds: diminished NOBLE     11:04 ED Triage Notes Filed Patient is here with her friend, she reports generalized weakness and shortness of breath since her \"spontaneous thoracic fracture\".  NOBLE          Tests Performed: EKG, ua, cardiac monitoring, labs. Abnormal Results:   Labs Ordered and Resulted from Time of ED Arrival Up to the Time of Departure from the ED   CBC WITH PLATELETS DIFFERENTIAL - Abnormal; Notable for the following components:       Result Value    RBC Count 3.72 (*)     Hemoglobin 11.1 (*)     RDW 15.9 (*)     Platelet Count 137 (*)     Absolute Lymphocytes 0.6 (*)     Absolute Monocytes 1.5 (*)     Absolute Metamyelocytes 0.1 (*)     All other components within normal limits   BASIC METABOLIC PANEL - Abnormal; Notable for the following components:    Glucose 117 (*)     GFR Estimate 56 (*)     All other components within normal limits   CRP INFLAMMATION - Abnormal; Notable for the following components:    CRP Inflammation 8.9 (*)     All other components within normal limits   CK TOTAL - Abnormal; Notable for the following components:    CK Total 20 (*)     All other components within normal limits   ROUTINE UA WITH MICROSCOPIC - Abnormal; Notable for the following components:    Blood Urine Small (*)     All other components within normal limits   INR - Abnormal; Notable for the following components:    INR 2.86 (*)     All other components within normal limits   ERYTHROCYTE SEDIMENTATION RATE AUTO - Abnormal; Notable for the following components:    Sed Rate 31 (*)     All other components within normal limits   TROPONIN I   NT PROBNP INPATIENT   PERIPHERAL IV CATHETER     XR Chest 2 Views   Preliminary Result   IMPRESSION: Small right pleural effusion was not visualized on the   previous exam. Hyperinflation both lungs. Aortic calcification. Mild   cardiomegaly has a similar appearance to the previous exam. Pulmonary   vascularity is " within normal limits. No pneumothorax.      .   Treatments provided: telemetry, lasix, frequent vital signs  Family Comments: friend here with her.  OBS brochure/video discussed/provided to patient:  No  ED Medications:   Medications   furosemide (LASIX) injection 40 mg (40 mg Intravenous Given 3/2/19 1333)     Drips infusing:  No  For the majority of the shift, the patient's behavior Green. Interventions performed were none.     Severe Sepsis OR Septic Shock Diagnosis Present: No      ED Nurse Name/Phone Number: Janet Majano,   1:42 PM    RECEIVING UNIT ED HANDOFF REVIEW    Above ED Nurse Handoff Report was reviewed: Yes  Reviewed by: Susan Sales on March 2, 2019 at 2:20 PM

## 2019-03-03 ENCOUNTER — APPOINTMENT (OUTPATIENT)
Dept: CARDIOLOGY | Facility: CLINIC | Age: 84
End: 2019-03-03
Attending: INTERNAL MEDICINE
Payer: MEDICARE

## 2019-03-03 VITALS
RESPIRATION RATE: 20 BRPM | DIASTOLIC BLOOD PRESSURE: 68 MMHG | OXYGEN SATURATION: 92 % | SYSTOLIC BLOOD PRESSURE: 121 MMHG | TEMPERATURE: 98.2 F | HEART RATE: 80 BPM

## 2019-03-03 LAB
ANION GAP SERPL CALCULATED.3IONS-SCNC: 7 MMOL/L (ref 3–14)
BUN SERPL-MCNC: 19 MG/DL (ref 7–30)
CALCIUM SERPL-MCNC: 9.2 MG/DL (ref 8.5–10.1)
CHLORIDE SERPL-SCNC: 105 MMOL/L (ref 94–109)
CO2 SERPL-SCNC: 24 MMOL/L (ref 20–32)
CREAT SERPL-MCNC: 1.01 MG/DL (ref 0.52–1.04)
GFR SERPL CREATININE-BSD FRML MDRD: 47 ML/MIN/{1.73_M2}
GLUCOSE SERPL-MCNC: 87 MG/DL (ref 70–99)
INR PPP: 2.65 (ref 0.86–1.14)
INTERPRETATION ECG - MUSE: NORMAL
POTASSIUM SERPL-SCNC: 3.8 MMOL/L (ref 3.4–5.3)
SODIUM SERPL-SCNC: 136 MMOL/L (ref 133–144)

## 2019-03-03 PROCEDURE — 93306 TTE W/DOPPLER COMPLETE: CPT | Mod: 26 | Performed by: INTERNAL MEDICINE

## 2019-03-03 PROCEDURE — 25500064 ZZH RX 255 OP 636: Performed by: INTERNAL MEDICINE

## 2019-03-03 PROCEDURE — 80048 BASIC METABOLIC PNL TOTAL CA: CPT | Performed by: INTERNAL MEDICINE

## 2019-03-03 PROCEDURE — 36415 COLL VENOUS BLD VENIPUNCTURE: CPT | Performed by: INTERNAL MEDICINE

## 2019-03-03 PROCEDURE — 40000264 ECHOCARDIOGRAM COMPLETE

## 2019-03-03 PROCEDURE — 99217 ZZC OBSERVATION CARE DISCHARGE: CPT | Performed by: HOSPITALIST

## 2019-03-03 PROCEDURE — G0378 HOSPITAL OBSERVATION PER HR: HCPCS

## 2019-03-03 PROCEDURE — 85610 PROTHROMBIN TIME: CPT | Performed by: INTERNAL MEDICINE

## 2019-03-03 RX ORDER — WARFARIN SODIUM 5 MG/1
5 TABLET ORAL
Status: DISCONTINUED | OUTPATIENT
Start: 2019-03-03 | End: 2019-03-03 | Stop reason: HOSPADM

## 2019-03-03 RX ADMIN — HUMAN ALBUMIN MICROSPHERES AND PERFLUTREN 3 ML: 10; .22 INJECTION, SOLUTION INTRAVENOUS at 10:05

## 2019-03-03 NOTE — PLAN OF CARE
PRIMARY DIAGNOSIS: GENERALIZED WEAKNESS     OUTPATIENT/OBSERVATION GOALS TO BE MET BEFORE DISCHARGE  1. Orthostatic performed: Yes:          Lying Orthostatic BP: 138/59         Sitting Orthostatic BP: 127/71         Standing Orthostatic BP: 115/58      2. Tolerating PO medications: Yes     3. Return to near baseline physical activity: Pt Consult in AM.      4. Cleared for discharge by consultants (if involved): N/A     Discharge Planner Nurse   Safe discharge environment identified: PT consult in AM.   Barriers to discharge: Yes     No acute events this overnight. PT consult later this morning and possible discharge.           Entered by: Dale Jay 03/03/2019     Please review provider order for any additional goals.   Nurse to notify provider when observation goals have been met and patient is ready for discharge.

## 2019-03-03 NOTE — PLAN OF CARE
Patient's After Visit Summary was reviewed with patient.   Patient verbalized understanding of After Visit Summary, recommended follow up and was given an opportunity to ask questions.   Discharge medications sent home with patient/family: Not applicable    Discharged with other: Friend    OBSERVATION patient END time: 2:49 PM

## 2019-03-03 NOTE — PLAN OF CARE
PRIMARY DIAGNOSIS: GENERALIZED WEAKNESS    OUTPATIENT/OBSERVATION GOALS TO BE MET BEFORE DISCHARGE  1. Orthostatic performed: Yes:          Lying Orthostatic BP: 138/59         Sitting Orthostatic BP: 127/71         Standing Orthostatic BP: 115/58     2. Tolerating PO medications: Yes    3. Return to near baseline physical activity: Yes    4. Cleared for discharge by consultants (if involved): No    Discharge Planner Nurse   Safe discharge environment identified: Yes  Barriers to discharge: Yes       Entered by: Susan Sales 03/03/2019   VSS and on room air. Patient is having an Echo done and could possibly discharge after lunch. Ambulated in the [place] this morning with no difficulties. Will continue to monitor and provide cares.   Please review provider order for any additional goals.   Nurse to notify provider when observation goals have been met and patient is ready for discharge.

## 2019-03-03 NOTE — DISCHARGE SUMMARY
[facility]  Hospitalist Discharge Summary       Date of Admission:  3/2/2019  Date of Discharge:  3/3/2019  Discharging Provider: Timothy Subramanian MD      Discharge Diagnoses   Generalized weakness    Follow-ups Needed After Discharge   Follow-up Appointments     Follow-up and recommended labs and tests       Follow up with primary care provider, Markie Becker, within 7 days   for hospital follow- up.  The following labs/tests are recommended: INR   check as previously scheduled.             Unresulted Labs Ordered in the Past 30 Days of this Admission     No orders found for last 61 day(s).          Discharge Disposition   Discharged to home  Condition at discharge: Stable    Hospital Course      89 year old female with PMH including atrial fibrillation (on Coumadin), lung cancer (status post wedge resection without further treatment) and thoracic compression fracture (1/2019, no longer painful) who was admitted 03/02/19 with progressive diffuse weakness and SOB.  History was obtained through patient interview, chart review and discussion with Dr. Wang in the ER.     Patient reports that she saw her primary care doctor on January 17 because she was having rather severe back pain.  She had an x-ray which showed thoracic compression fractures.  She reports that her provider wanted her to have a procedure to fix this but [facility] and was told that her bones are too severe to have this procedure done.  She had home health care with RN, home health aide and physical therapist.  This finished 2 weeks ago.  She was initially treated with pain medications for her compression fracture but she states the pain has now resolved.  The only time she gets pains if she stands too long at this point.     She states over the past few months she has becoming progressively weaker.  She reports that she has a very poor appetite.  She has no stamina and felt that things were worsening to the  point that she needed further help and evaluation.     She states that today she tried to change her she had her eyes and was so fatigued that she had to take rests while doing this.  She has to sit down and put her head between her knees before she feels better.  He denies dizziness.  She just feels diffusely weak.  She has not had nausea or vomiting.  No chest pain, fevers, chills, bleeding, urinary symptoms.  She did have significant constipation when she was on opiates for her thoracic back pain but this resolved when she stopped those medications.  She does get quite short of breath when she exerts herself does not have any shortness of breath at rest.  She has no cough.  She reports orthopnea but this has been present for 6-8 months (sleeps with 2 pillows).     She does have an uncomfortable feeling in her stomach.  She reports that she gets panicky feeling because of this.  She has a very difficult time describing why her abdomen feels so uncomfortable.  She denies any pain.  She has a very poor appetite.  She gets full after only a few bites.  She also has a very dry mouth.     The patient lives independently in a town home.  This month she did get help cleaning her home for the first time.  She does have friends who help her and bring meals.  Otherwise she lives independently.    Work-up here was unrevealing. Patient today feels well. She has been ambulating. She has had breakfast and lunch. She has no complaints. Would like to go home. SHe does state that she has been having some loose stool. Not watery only loose. She has no family in the area only friends. AT first she would not accept home services but I convinced her to accept home nursing and PT.    Consultations This Hospital Stay   PHYSICAL THERAPY ADULT IP CONSULT  PHARMACY TO DOSE WARFARIN  CARE COORDINATOR IP CONSULT    Code Status   DNR/DNI    Time Spent on this Encounter   I, Timothy Subramanian, personally saw the patient today and spent greater  than 30 minutes discharging this patient.       Timothy Subramanian MD  [facility]  ______________________________________________________________________    Physical Exam   Vital Signs: Temp: 98.8  F (37.1  C) Temp src: Oral BP: 133/68   Heart Rate: 75 Resp: 20 SpO2: 92 % O2 Device: None (Room air)    Weight: 0 lbs 0 oz  Constitutional: awake, alert, cooperative, no apparent distress, and appears stated age  Respiratory: No increased work of breathing, good air exchange, clear to auscultation bilaterally, no crackles or wheezing  Cardiovascular: Normal apical impulse, regular rate and rhythm, normal S1 and S2, no S3 or S4, and no murmur noted  GI: No scars, normal bowel sounds, soft, non-distended, non-tender, no masses palpated, no hepatosplenomegally       Primary Care Physician   Markie Becker    Immunizations       Discharge Orders      Home care nursing referral      Home Care PT Referral for Hospital Discharge      Reason for your hospital stay    Weakness, loose stools. Weakness has resolved     Follow-up and recommended labs and tests     Follow up with primary care provider, Markie Becker, within 7 days for hospital follow- up.  The following labs/tests are recommended: INR check as previously scheduled.     Activity    Your activity upon discharge: activity as tolerated     MD face to face encounter    Documentation of Face to Face and Certification for Home Health Services    I certify that patient: Tameka Andersen is under my care and that I, or a nurse practitioner or physician's assistant working with me, had a face-to-face encounter that meets the physician face-to-face encounter requirements with this patient on: 3/3/2019.    This encounter with the patient was in whole, or in part, for the following medical condition, which is the primary reason for home health care: weakness.    I certify that, based on my findings, the following services are medically necessary home health  services: Nursing and Physical Therapy.    My clinical findings support the need for the above services because: Nurse is needed: To assess vitals after changes in medications or other medical regimen.. and Physical Therapy Services are needed to assess and treat the following functional impairments: weakness.    Further, I certify that my clinical findings support that this patient is homebound (i.e. absences from home require considerable and taxing effort and are for medical reasons or [employer] services or infrequently or of short duration when for other reasons) because: Leaving home is medically contraindicated for the following reason(s): requires assistance for safe transfer. and Requires assistance of another person or specialized equipment to access medical services because patient: Requires supervision of another for safe transfer...    Based on the above findings. I certify that this patient is confined to the home and needs intermittent skilled nursing care, physical therapy and/or speech therapy.  The patient is under my care, and I have initiated the establishment of the plan of care.  This patient will be followed by a physician who will periodically review the plan of care.  Physician/Provider to provide follow up care: Markie Becker    Attending [facility] [place] physician (the Medicare certified [place] provider): Timothy Subramanian  Physician Signature: See electronic signature associated with these discharge orders.  Date: 3/3/2019     Diet    Follow this diet upon discharge: Orders Placed This Encounter      Regular Diet Adult       Significant Results and Procedures   Most Recent 3 CBC's:  Recent Labs   Lab Test 03/02/19  1120 06/14/18  0636 06/13/18  0816 04/18/17  1009   WBC 6.2  --   --  9.5   HGB 11.1* 9.9* 9.9* 12.4   MCV 94  --   --  97   *  --   --  96*     Most Recent 3 BMP's:  Recent Labs   Lab Test 03/03/19  0635 03/02/19  1120 06/14/18  0636  04/18/17  1009    133  --   --   136   POTASSIUM 3.8 4.0  --   --  4.5   CHLORIDE 105 103  --   --  102   CO2 24 24  --   --  22   BUN 19 21  --   --  24   CR 1.01 0.88  --   --  1.02   ANIONGAP 7 6  --   --  12   NADIA 9.2 9.1  --   --  9.0   GLC 87 117* 109*   < > 124*    < > = values in this interval not displayed.   ,   Results for orders placed or performed during the hospital encounter of 03/02/19   XR Chest 2 Views    Narrative    CHEST TWO VIEWS  3/2/2019 11:47 AM     HISTORY:  Shortness of breath.    COMPARISON: 1/8/2012.      Impression    IMPRESSION: Small right pleural effusion was not visualized on the  previous exam. Hyperinflation both lungs. Aortic calcification. Mild  cardiomegaly has a similar appearance to the previous exam. Pulmonary  vascularity is within normal limits. No pneumothorax.    BEHZAD ABURTO MD       Discharge Medications   Current Discharge Medication List      CONTINUE these medications which have NOT CHANGED    Details   ACETAMINOPHEN PO Take 1,000 mg by mouth every 6 hours as needed for pain      Bisacodyl (DULCOLAX PO) Take by mouth daily as needed      cholecalciferol (VITAMIN D3) 5000 units TABS tablet Take 5,000 Units by mouth daily      METOPROLOL TARTRATE PO Take 25 mg by mouth 2 times daily       Propylene Glycol-Glycerin (SOOTHE OP) Apply 1 drop to eye 2 times daily      !! warfarin (COUMADIN) 5 MG tablet Take  5 mg on Tues, Thurs, Sat, and Sun      !! warfarin (COUMADIN) 5 MG tablet Take 2.5 mg by mouth Take 2.5 mg on Mond, Wed and Fri       !! - Potential duplicate medications found. Please discuss with provider.      STOP taking these medications       polyethylene glycol 400 (BLINK TEARS) 0.25 % SOLN ophthalmic solution Comments:   Reason for Stopping:             Allergies   Allergies   Allergen Reactions     Thorazine [Chlorpromazine Hcl]

## 2019-03-03 NOTE — PLAN OF CARE
PRIMARY DIAGNOSIS: GENERALIZED WEAKNESS     OUTPATIENT/OBSERVATION GOALS TO BE MET BEFORE DISCHARGE  1. Orthostatic performed: Yes:          Lying Orthostatic BP: 138/59         Sitting Orthostatic BP: 127/71         Standing Orthostatic BP: 115/58      2. Tolerating PO medications: Yes     3. Return to near baseline physical activity: Yes     4. Cleared for discharge by consultants (if involved): No     Discharge Planner Nurse   Safe discharge environment identified: Yes  Barriers to discharge: Yes       Entered by: Susan Sales 03/03/2019   VSS and on room air. Patient is having an Echo done and could possibly discharge after lunch. Ambulated in the [place] this morning with no difficulties.  Metorpolol held this AM due to low BP. Will continue to monitor and provide cares.     Please review provider order for any additional goals.   Nurse to notify provider when observation goals have been met and patient is ready for discharge.

## 2019-03-03 NOTE — PLAN OF CARE
PRIMARY DIAGNOSIS: GENERALIZED WEAKNESS     OUTPATIENT/OBSERVATION GOALS TO BE MET BEFORE DISCHARGE  1. Orthostatic performed: Yes:          Lying Orthostatic BP: 138/59         Sitting Orthostatic BP: 127/71         Standing Orthostatic BP: 115/58      2. Tolerating PO medications: Yes     3. Return to near baseline physical activity: Pt Consult in AM.      4. Cleared for discharge by consultants (if involved): N/A     Discharge Planner Nurse   Safe discharge environment identified: PT consult in AM.   Barriers to discharge: Yes         No acute events this evening.  Pt endorsing feeling weak. PT consult in the AM.        Entered by: Dale Jay 03/03/2019    Please review provider order for any additional goals.   Nurse to notify provider when observation goals have been met and patient is ready for discharge.

## 2019-03-03 NOTE — PHARMACY-ADMISSION MEDICATION HISTORY
Admission medication history interview status for this patient is complete. See [facility] admission navigator for allergy information, prior to admission medications and immunization status.     Medication history interview source(s):Patient  Medication history resources (including written lists, pill bottles, clinic record):None  Primary pharmacy:[facility]    Changes made to PTA medication list:  Added: dulcolax  Deleted: dilaudid  Changed: warfarin to 2.5 mg on M,W, F and 5 mg ROW, vitamin D from 2,000 units to 5000 units, eye drops from blink tears to soothe eye drops.     Actions taken by pharmacist (provider contacted, etc):None     Additional medication history information:None    Medication reconciliation/reorder completed by provider prior to medication history? Yes    Do you take OTC medications (eg tylenol, ibuprofen, fish oil, eye/ear drops, etc)? Y(Y/N)    For patients on insulin therapy: N (Y/N)    Time spent in this activity: 15 min    Prior to Admission medications    Medication Sig Last Dose Taking? Auth Provider   ACETAMINOPHEN PO Take 1,000 mg by mouth every 6 hours as needed for pain  at prn Yes Reported, Patient   Bisacodyl (DULCOLAX PO) Take by mouth daily as needed  at prn Yes Unknown, Entered By History   cholecalciferol (VITAMIN D3) 5000 units TABS tablet Take 5,000 Units by mouth daily 3/1/2019 at Unknown time Yes Unknown, Entered By History   METOPROLOL TARTRATE PO Take 25 mg by mouth 2 times daily  3/1/2019 Yes Reported, Patient   Propylene Glycol-Glycerin (SOOTHE OP) Apply 1 drop to eye 2 times daily 3/1/2019 at Unknown time Yes Unknown, Entered By History   warfarin (COUMADIN) 5 MG tablet Take  5 mg on Tues, Thurs, Sat, and Sun 2/28/2019 Yes Unknown, Entered By History   warfarin (COUMADIN) 5 MG tablet Take 2.5 mg by mouth Take 2.5 mg on Mond, Wed and Fri 3/1/2019 at pm Yes Unknown, Entered By History

## 2019-03-03 NOTE — DISCHARGE INSTRUCTIONS
Your home care referral was sent to [facility].  If you haven't heard from them within the next 24-48 hours,  Please call them at [phone]

## 2019-03-03 NOTE — PLAN OF CARE
PRIMARY DIAGNOSIS: GENERALIZED WEAKNESS    OUTPATIENT/OBSERVATION GOALS TO BE MET BEFORE DISCHARGE  1. Orthostatic performed: Yes:          Lying Orthostatic BP: 138/59         Sitting Orthostatic BP: 127/71         Standing Orthostatic BP: 115/58     2. Tolerating PO medications: Yes    3. Return to near baseline physical activity: Pt Consult in AM.     4. Cleared for discharge by consultants (if involved): N/A    Discharge Planner Nurse   Safe discharge environment identified: PT consult in AM.   Barriers to discharge: Yes       Entered by: Dale Jay 03/02/2019 9:23 PM     Please review provider order for any additional goals.   Nurse to notify provider when observation goals have been met and patient is ready for discharge.

## 2019-03-03 NOTE — CONSULTS
Discharge Planner   Discharge Plans in progress: Yes, patient ready to discharge today. Met with patient at bedside who is agreeable and understanding of home care. Patient has recently used [facility] [phone] and requests referral sent. Referral sent for RN/PT.   Barriers to discharge plan: Home care requesting date for next INR check.   Follow up plan: Updated home care to [facility]. CM available for any further discharge planning.        Entered by: Rocio Bonds 03/03/2019 1:57 PM       Rocio Bonds MA-RN  Care Transition Services  [phone]    Update 1500 Orders faxed to home care [phone].

## 2019-03-04 NOTE — PHARMACY-ANTICOAGULATION SERVICE
Clinical Pharmacy- Warfarin Discharge Note  This patient is currently on warfarin for the treatment of Atrial fibrillation.  INR Goal= 2-3  Expected length of therapy lifetime.    Warfarin PTA Regimen: 2.5 mg on Tues, Friday. 5 mg on all other days      Anticoagulation Dose History     Recent Dosing and Labs Latest Ref Rng & Units 4/18/2017 6/12/2018 6/13/2018 6/14/2018 6/15/2018 3/2/2019 3/3/2019    Warfarin 2 mg - - - - - - 4 mg -    Warfarin 2.5 mg - - 2.5 mg - - - - -    Warfarin 5 mg - - - 5 mg 5 mg - - -    INR 0.86 - 1.14 2.32(H) 1.22(H) 1.32(H) 1.36(H) 1.43(H) 2.86(H) 2.65(H)        Agree with discharge orders to continue home regimen and to have INR checked as normal

## 2019-03-05 ENCOUNTER — HOSPITAL ENCOUNTER (OUTPATIENT)
Facility: CLINIC | Age: 84
Setting detail: OBSERVATION
Discharge: NURSING, REHAB OR LONG-TERM CARE | End: 2019-03-06
Attending: EMERGENCY MEDICINE | Admitting: HOSPITALIST
Payer: MEDICARE

## 2019-03-05 ENCOUNTER — APPOINTMENT (OUTPATIENT)
Dept: CT IMAGING | Facility: CLINIC | Age: 84
End: 2019-03-05
Attending: EMERGENCY MEDICINE
Payer: MEDICARE

## 2019-03-05 ENCOUNTER — APPOINTMENT (OUTPATIENT)
Dept: GENERAL RADIOLOGY | Facility: CLINIC | Age: 84
End: 2019-03-05
Attending: EMERGENCY MEDICINE
Payer: MEDICARE

## 2019-03-05 DIAGNOSIS — I50.32 CHRONIC DIASTOLIC CONGESTIVE HEART FAILURE (H): ICD-10-CM

## 2019-03-05 DIAGNOSIS — R53.81 DEBILITY: ICD-10-CM

## 2019-03-05 DIAGNOSIS — K52.9 ENTERITIS: ICD-10-CM

## 2019-03-05 LAB
ALBUMIN SERPL-MCNC: 3.6 G/DL (ref 3.4–5)
ALBUMIN UR-MCNC: NEGATIVE MG/DL
ALP SERPL-CCNC: 75 U/L (ref 40–150)
ALT SERPL W P-5'-P-CCNC: 19 U/L (ref 0–50)
ANION GAP SERPL CALCULATED.3IONS-SCNC: 13 MMOL/L (ref 6–17)
ANION GAP SERPL CALCULATED.3IONS-SCNC: 8 MMOL/L (ref 3–14)
ANION GAP SERPL CALCULATED.3IONS-SCNC: 9 MMOL/L (ref 3–14)
APPEARANCE UR: CLEAR
AST SERPL W P-5'-P-CCNC: 21 U/L (ref 0–45)
BACTERIA #/AREA URNS HPF: ABNORMAL /HPF
BASOPHILS # BLD AUTO: 0 10E9/L (ref 0–0.2)
BASOPHILS # BLD AUTO: 0 10E9/L (ref 0–0.2)
BASOPHILS NFR BLD AUTO: 0.1 %
BASOPHILS NFR BLD AUTO: 0.2 %
BILIRUB SERPL-MCNC: 0.6 MG/DL (ref 0.2–1.3)
BILIRUB UR QL STRIP: NEGATIVE
BUN SERPL-MCNC: 25 MG/DL (ref 7–30)
BUN SERPL-MCNC: 26 MG/DL (ref 7–30)
BUN SERPL-MCNC: 26 MG/DL (ref 7–30)
C DIFF TOX B STL QL: NEGATIVE
CA-I BLD-SCNC: 4.4 MG/DL (ref 4.4–5.2)
CALCIUM SERPL-MCNC: 8.6 MG/DL (ref 8.5–10.1)
CALCIUM SERPL-MCNC: 9 MG/DL (ref 8.5–10.1)
CHLORIDE BLD-SCNC: 98 MMOL/L (ref 94–109)
CHLORIDE SERPL-SCNC: 101 MMOL/L (ref 94–109)
CHLORIDE SERPL-SCNC: 103 MMOL/L (ref 94–109)
CO2 BLD-SCNC: 24 MMOL/L (ref 20–32)
CO2 SERPL-SCNC: 24 MMOL/L (ref 20–32)
CO2 SERPL-SCNC: 24 MMOL/L (ref 20–32)
COLOR UR AUTO: YELLOW
CREAT BLD-MCNC: 1 MG/DL (ref 0.52–1.04)
CREAT SERPL-MCNC: 0.96 MG/DL (ref 0.52–1.04)
CREAT SERPL-MCNC: 0.96 MG/DL (ref 0.52–1.04)
DIFFERENTIAL METHOD BLD: ABNORMAL
DIFFERENTIAL METHOD BLD: ABNORMAL
EOSINOPHIL # BLD AUTO: 0 10E9/L (ref 0–0.7)
EOSINOPHIL # BLD AUTO: 0 10E9/L (ref 0–0.7)
EOSINOPHIL NFR BLD AUTO: 0 %
EOSINOPHIL NFR BLD AUTO: 0.1 %
ERYTHROCYTE [DISTWIDTH] IN BLOOD BY AUTOMATED COUNT: 16 % (ref 10–15)
ERYTHROCYTE [DISTWIDTH] IN BLOOD BY AUTOMATED COUNT: 16.2 % (ref 10–15)
GFR SERPL CREATININE-BSD FRML MDRD: 50 ML/MIN/{1.73_M2}
GFR SERPL CREATININE-BSD FRML MDRD: 50 ML/MIN/{1.73_M2}
GFR SERPL CREATININE-BSD FRML MDRD: 52 ML/MIN/{1.73_M2}
GLUCOSE BLD-MCNC: 120 MG/DL (ref 70–99)
GLUCOSE SERPL-MCNC: 116 MG/DL (ref 70–99)
GLUCOSE SERPL-MCNC: 99 MG/DL (ref 70–99)
GLUCOSE UR STRIP-MCNC: NEGATIVE MG/DL
HCT VFR BLD AUTO: 37.1 % (ref 35–47)
HCT VFR BLD AUTO: 38.7 % (ref 35–47)
HCT VFR BLD CALC: 39 %PCV (ref 35–47)
HGB BLD CALC-MCNC: 13.3 G/DL (ref 11.7–15.7)
HGB BLD-MCNC: 11.9 G/DL (ref 11.7–15.7)
HGB BLD-MCNC: 12.7 G/DL (ref 11.7–15.7)
HGB UR QL STRIP: NEGATIVE
HYALINE CASTS #/AREA URNS LPF: 5 /LPF (ref 0–2)
IMM GRANULOCYTES # BLD: 0.1 10E9/L (ref 0–0.4)
IMM GRANULOCYTES # BLD: 0.1 10E9/L (ref 0–0.4)
IMM GRANULOCYTES NFR BLD: 0.8 %
IMM GRANULOCYTES NFR BLD: 0.9 %
INR PPP: 3.54 (ref 0.86–1.14)
INTERPRETATION ECG - MUSE: NORMAL
KETONES UR STRIP-MCNC: NEGATIVE MG/DL
LEUKOCYTE ESTERASE UR QL STRIP: NEGATIVE
LIPASE SERPL-CCNC: 94 U/L (ref 73–393)
LYMPHOCYTES # BLD AUTO: 0.3 10E9/L (ref 0.8–5.3)
LYMPHOCYTES # BLD AUTO: 0.4 10E9/L (ref 0.8–5.3)
LYMPHOCYTES NFR BLD AUTO: 4.9 %
LYMPHOCYTES NFR BLD AUTO: 7.1 %
MAGNESIUM SERPL-MCNC: 2.1 MG/DL (ref 1.6–2.3)
MCH RBC QN AUTO: 30.1 PG (ref 26.5–33)
MCH RBC QN AUTO: 30.5 PG (ref 26.5–33)
MCHC RBC AUTO-ENTMCNC: 32.1 G/DL (ref 31.5–36.5)
MCHC RBC AUTO-ENTMCNC: 32.8 G/DL (ref 31.5–36.5)
MCV RBC AUTO: 93 FL (ref 78–100)
MCV RBC AUTO: 94 FL (ref 78–100)
MONOCYTES # BLD AUTO: 0.9 10E9/L (ref 0–1.3)
MONOCYTES # BLD AUTO: 1.2 10E9/L (ref 0–1.3)
MONOCYTES NFR BLD AUTO: 13.8 %
MONOCYTES NFR BLD AUTO: 19.9 %
MUCOUS THREADS #/AREA URNS LPF: PRESENT /LPF
NEUTROPHILS # BLD AUTO: 4.5 10E9/L (ref 1.6–8.3)
NEUTROPHILS # BLD AUTO: 5.4 10E9/L (ref 1.6–8.3)
NEUTROPHILS NFR BLD AUTO: 72 %
NEUTROPHILS NFR BLD AUTO: 80.2 %
NITRATE UR QL: NEGATIVE
NRBC # BLD AUTO: 0 10*3/UL
NRBC # BLD AUTO: 0 10*3/UL
NRBC BLD AUTO-RTO: 0 /100
NRBC BLD AUTO-RTO: 0 /100
NT-PROBNP SERPL-MCNC: 708 PG/ML (ref 0–1800)
PH UR STRIP: 5 PH (ref 5–7)
PLATELET # BLD AUTO: 100 10E9/L (ref 150–450)
PLATELET # BLD AUTO: 114 10E9/L (ref 150–450)
POTASSIUM BLD-SCNC: 3.9 MMOL/L (ref 3.4–5.3)
POTASSIUM SERPL-SCNC: 3.9 MMOL/L (ref 3.4–5.3)
POTASSIUM SERPL-SCNC: 4 MMOL/L (ref 3.4–5.3)
PROT SERPL-MCNC: 7.8 G/DL (ref 6.8–8.8)
RBC # BLD AUTO: 3.95 10E12/L (ref 3.8–5.2)
RBC # BLD AUTO: 4.16 10E12/L (ref 3.8–5.2)
RBC #/AREA URNS AUTO: 5 /HPF (ref 0–2)
SODIUM BLD-SCNC: 135 MMOL/L (ref 133–144)
SODIUM SERPL-SCNC: 134 MMOL/L (ref 133–144)
SODIUM SERPL-SCNC: 135 MMOL/L (ref 133–144)
SOURCE: ABNORMAL
SP GR UR STRIP: 1.02 (ref 1–1.03)
SPECIMEN SOURCE: NORMAL
SQUAMOUS #/AREA URNS AUTO: <1 /HPF (ref 0–1)
TROPONIN I SERPL-MCNC: <0.015 UG/L (ref 0–0.04)
UROBILINOGEN UR STRIP-MCNC: 0 MG/DL (ref 0–2)
WBC # BLD AUTO: 6.2 10E9/L (ref 4–11)
WBC # BLD AUTO: 6.7 10E9/L (ref 4–11)
WBC #/AREA URNS AUTO: 1 /HPF (ref 0–5)

## 2019-03-05 PROCEDURE — A9270 NON-COVERED ITEM OR SERVICE: HCPCS | Mod: GY | Performed by: PHYSICIAN ASSISTANT

## 2019-03-05 PROCEDURE — 36415 COLL VENOUS BLD VENIPUNCTURE: CPT | Performed by: PHYSICIAN ASSISTANT

## 2019-03-05 PROCEDURE — 25000128 H RX IP 250 OP 636: Performed by: EMERGENCY MEDICINE

## 2019-03-05 PROCEDURE — 85025 COMPLETE CBC W/AUTO DIFF WBC: CPT | Performed by: EMERGENCY MEDICINE

## 2019-03-05 PROCEDURE — 83735 ASSAY OF MAGNESIUM: CPT | Performed by: EMERGENCY MEDICINE

## 2019-03-05 PROCEDURE — 83690 ASSAY OF LIPASE: CPT | Performed by: EMERGENCY MEDICINE

## 2019-03-05 PROCEDURE — 80048 BASIC METABOLIC PNL TOTAL CA: CPT | Performed by: PHYSICIAN ASSISTANT

## 2019-03-05 PROCEDURE — 80053 COMPREHEN METABOLIC PANEL: CPT | Performed by: EMERGENCY MEDICINE

## 2019-03-05 PROCEDURE — 25800030 ZZH RX IP 258 OP 636: Performed by: PHYSICIAN ASSISTANT

## 2019-03-05 PROCEDURE — 71046 X-RAY EXAM CHEST 2 VIEWS: CPT

## 2019-03-05 PROCEDURE — 81001 URINALYSIS AUTO W/SCOPE: CPT | Performed by: EMERGENCY MEDICINE

## 2019-03-05 PROCEDURE — 99219 ZZC INITIAL OBSERVATION CARE,LEVL II: CPT | Performed by: PHYSICIAN ASSISTANT

## 2019-03-05 PROCEDURE — 85610 PROTHROMBIN TIME: CPT | Performed by: EMERGENCY MEDICINE

## 2019-03-05 PROCEDURE — 96361 HYDRATE IV INFUSION ADD-ON: CPT

## 2019-03-05 PROCEDURE — 25000132 ZZH RX MED GY IP 250 OP 250 PS 637: Mod: GY | Performed by: PHYSICIAN ASSISTANT

## 2019-03-05 PROCEDURE — A9270 NON-COVERED ITEM OR SERVICE: HCPCS | Mod: GY | Performed by: HOSPITALIST

## 2019-03-05 PROCEDURE — 74177 CT ABD & PELVIS W/CONTRAST: CPT

## 2019-03-05 PROCEDURE — 85014 HEMATOCRIT: CPT | Mod: 91

## 2019-03-05 PROCEDURE — 93005 ELECTROCARDIOGRAM TRACING: CPT

## 2019-03-05 PROCEDURE — 87493 C DIFF AMPLIFIED PROBE: CPT | Performed by: EMERGENCY MEDICINE

## 2019-03-05 PROCEDURE — 80047 BASIC METABLC PNL IONIZED CA: CPT | Mod: XU

## 2019-03-05 PROCEDURE — A9270 NON-COVERED ITEM OR SERVICE: HCPCS | Mod: GY | Performed by: EMERGENCY MEDICINE

## 2019-03-05 PROCEDURE — 84484 ASSAY OF TROPONIN QUANT: CPT | Performed by: EMERGENCY MEDICINE

## 2019-03-05 PROCEDURE — 25000132 ZZH RX MED GY IP 250 OP 250 PS 637: Mod: GY | Performed by: EMERGENCY MEDICINE

## 2019-03-05 PROCEDURE — G0378 HOSPITAL OBSERVATION PER HR: HCPCS

## 2019-03-05 PROCEDURE — 99285 EMERGENCY DEPT VISIT HI MDM: CPT | Mod: 25

## 2019-03-05 PROCEDURE — 87506 IADNA-DNA/RNA PROBE TQ 6-11: CPT | Performed by: PHYSICIAN ASSISTANT

## 2019-03-05 PROCEDURE — 83880 ASSAY OF NATRIURETIC PEPTIDE: CPT | Performed by: EMERGENCY MEDICINE

## 2019-03-05 PROCEDURE — 85025 COMPLETE CBC W/AUTO DIFF WBC: CPT | Performed by: PHYSICIAN ASSISTANT

## 2019-03-05 PROCEDURE — 96374 THER/PROPH/DIAG INJ IV PUSH: CPT | Mod: 59

## 2019-03-05 PROCEDURE — 25000132 ZZH RX MED GY IP 250 OP 250 PS 637: Mod: GY | Performed by: HOSPITALIST

## 2019-03-05 RX ORDER — SODIUM CHLORIDE, SODIUM LACTATE, POTASSIUM CHLORIDE, CALCIUM CHLORIDE 600; 310; 30; 20 MG/100ML; MG/100ML; MG/100ML; MG/100ML
INJECTION, SOLUTION INTRAVENOUS CONTINUOUS
Status: DISCONTINUED | OUTPATIENT
Start: 2019-03-05 | End: 2019-03-05

## 2019-03-05 RX ORDER — IOPAMIDOL 755 MG/ML
500 INJECTION, SOLUTION INTRAVASCULAR ONCE
Status: COMPLETED | OUTPATIENT
Start: 2019-03-05 | End: 2019-03-05

## 2019-03-05 RX ORDER — LIDOCAINE 40 MG/G
CREAM TOPICAL
Status: DISCONTINUED | OUTPATIENT
Start: 2019-03-05 | End: 2019-03-06 | Stop reason: HOSPADM

## 2019-03-05 RX ORDER — METOPROLOL TARTRATE 25 MG/1
25 TABLET, FILM COATED ORAL 2 TIMES DAILY
Status: DISCONTINUED | OUTPATIENT
Start: 2019-03-05 | End: 2019-03-06 | Stop reason: HOSPADM

## 2019-03-05 RX ORDER — ACETAMINOPHEN 325 MG/1
650 TABLET ORAL EVERY 4 HOURS PRN
Status: DISCONTINUED | OUTPATIENT
Start: 2019-03-05 | End: 2019-03-06 | Stop reason: HOSPADM

## 2019-03-05 RX ORDER — ACETAMINOPHEN 650 MG/1
650 SUPPOSITORY RECTAL EVERY 4 HOURS PRN
Status: DISCONTINUED | OUTPATIENT
Start: 2019-03-05 | End: 2019-03-06 | Stop reason: HOSPADM

## 2019-03-05 RX ORDER — ONDANSETRON 2 MG/ML
4 INJECTION INTRAMUSCULAR; INTRAVENOUS ONCE
Status: COMPLETED | OUTPATIENT
Start: 2019-03-05 | End: 2019-03-05

## 2019-03-05 RX ORDER — DICYCLOMINE HYDROCHLORIDE 10 MG/1
10 CAPSULE ORAL 4 TIMES DAILY
Status: DISCONTINUED | OUTPATIENT
Start: 2019-03-05 | End: 2019-03-06

## 2019-03-05 RX ORDER — DICYCLOMINE HYDROCHLORIDE 10 MG/1
10 CAPSULE ORAL ONCE
Status: COMPLETED | OUTPATIENT
Start: 2019-03-05 | End: 2019-03-05

## 2019-03-05 RX ORDER — ONDANSETRON 4 MG/1
4 TABLET, ORALLY DISINTEGRATING ORAL EVERY 6 HOURS PRN
Status: DISCONTINUED | OUTPATIENT
Start: 2019-03-05 | End: 2019-03-06 | Stop reason: HOSPADM

## 2019-03-05 RX ORDER — ACETAMINOPHEN 325 MG/1
650 TABLET ORAL ONCE
Status: COMPLETED | OUTPATIENT
Start: 2019-03-05 | End: 2019-03-05

## 2019-03-05 RX ORDER — ONDANSETRON 2 MG/ML
4 INJECTION INTRAMUSCULAR; INTRAVENOUS EVERY 6 HOURS PRN
Status: DISCONTINUED | OUTPATIENT
Start: 2019-03-05 | End: 2019-03-06 | Stop reason: HOSPADM

## 2019-03-05 RX ORDER — NALOXONE HYDROCHLORIDE 0.4 MG/ML
.1-.4 INJECTION, SOLUTION INTRAMUSCULAR; INTRAVENOUS; SUBCUTANEOUS
Status: DISCONTINUED | OUTPATIENT
Start: 2019-03-05 | End: 2019-03-06 | Stop reason: HOSPADM

## 2019-03-05 RX ORDER — SODIUM CHLORIDE, SODIUM LACTATE, POTASSIUM CHLORIDE, CALCIUM CHLORIDE 600; 310; 30; 20 MG/100ML; MG/100ML; MG/100ML; MG/100ML
INJECTION, SOLUTION INTRAVENOUS CONTINUOUS
Status: ACTIVE | OUTPATIENT
Start: 2019-03-05 | End: 2019-03-06

## 2019-03-05 RX ORDER — WARFARIN SODIUM 1 MG/1
1 TABLET ORAL ONCE
Status: COMPLETED | OUTPATIENT
Start: 2019-03-05 | End: 2019-03-05

## 2019-03-05 RX ADMIN — IOPAMIDOL 74 ML: 755 INJECTION, SOLUTION INTRAVENOUS at 12:51

## 2019-03-05 RX ADMIN — DICYCLOMINE HYDROCHLORIDE 10 MG: 10 CAPSULE ORAL at 11:58

## 2019-03-05 RX ADMIN — METOPROLOL TARTRATE 25 MG: 25 TABLET ORAL at 20:29

## 2019-03-05 RX ADMIN — SODIUM CHLORIDE, POTASSIUM CHLORIDE, SODIUM LACTATE AND CALCIUM CHLORIDE: 600; 310; 30; 20 INJECTION, SOLUTION INTRAVENOUS at 16:46

## 2019-03-05 RX ADMIN — DICYCLOMINE HYDROCHLORIDE 10 MG: 10 CAPSULE ORAL at 20:29

## 2019-03-05 RX ADMIN — DICYCLOMINE HYDROCHLORIDE 10 MG: 10 CAPSULE ORAL at 16:45

## 2019-03-05 RX ADMIN — ONDANSETRON 4 MG: 2 INJECTION INTRAMUSCULAR; INTRAVENOUS at 11:57

## 2019-03-05 RX ADMIN — ACETAMINOPHEN 650 MG: 325 TABLET, FILM COATED ORAL at 11:58

## 2019-03-05 RX ADMIN — SODIUM CHLORIDE 59 ML: 9 INJECTION, SOLUTION INTRAVENOUS at 12:51

## 2019-03-05 RX ADMIN — SODIUM CHLORIDE 500 ML: 9 INJECTION, SOLUTION INTRAVENOUS at 11:58

## 2019-03-05 RX ADMIN — WARFARIN SODIUM 1 MG: 1 TABLET ORAL at 20:29

## 2019-03-05 ASSESSMENT — ENCOUNTER SYMPTOMS
DIARRHEA: 1
NAUSEA: 1
COUGH: 1
BLOOD IN STOOL: 0
FEVER: 0
WEAKNESS: 1
SHORTNESS OF BREATH: 1
PALPITATIONS: 1
VOMITING: 1
ABDOMINAL PAIN: 1

## 2019-03-05 ASSESSMENT — MIFFLIN-ST. JEOR: SCORE: 894.84

## 2019-03-05 NOTE — PLAN OF CARE
ROOM # 231    Living Situation (if not independent, order SW consult): Ind in [facility]  Facility name:  : [facility] ([phone] - [phone]    Activity level at baseline: Ind with cane  Activity level on admit: SBA d/t weakness    Of note - patient was discharged recently with home RN & therapy; had not been called to set up first visit yet.     Patient registered to observation; given Patient Bill of Rights; given the opportunity to ask questions about observation status and their plan of care.  Patient has been oriented to the observation room, bathroom and call light is in place.    Discussed discharge goals and expectations with patient/family.

## 2019-03-05 NOTE — ED TRIAGE NOTES
"Arrives to ED with \"A fib\" it started last night, I feel SOB, lightheaded and dizzy.\" She reports vomiting this am with diarrhea. Pt a/ox 3, ABC\"s intact.  "

## 2019-03-05 NOTE — ED NOTES
RECEIVING UNIT ED HANDOFF REVIEW    Above ED Nurse Handoff Report was reviewed: Yes  Reviewed by: María Elena Dent on March 5, 2019 at 2:26 PM   [facility]  ED Nurse Handoff Report    Tameka Andersen is a 89 year old female   ED Chief complaint: Tachycardia  . ED Diagnosis:   Final diagnoses:   Generalized muscle weakness   Debility     Allergies:   Allergies   Allergen Reactions     Thorazine [Chlorpromazine Hcl]        Code Status: DNR / DNI  Activity level - Baseline/Home:  Independent. Activity Level - Current:   Stand with Assist. Lift room needed: No. Bariatric: No   Needed: No   Isolation: No. Infection: Not Applicable.     Vital Signs:   Vitals:    03/05/19 1200 03/05/19 1215 03/05/19 1230 03/05/19 1245   BP: 135/86 135/76 136/68 114/66   Pulse:  81 78 75   Resp: 21 17 22 18   Temp:       SpO2:    94%       Cardiac Rhythm:  ,      Pain level:    Patient confused: No. Patient Falls Risk: Yes.   Elimination Status: Has voided   Patient Report / Focused Assessment:    Cardiac Cardiac - Cardiac WDL:  (PT REPORTS INTERMITTENT EPISODES OF AFIB, DIZZY AND WEAK)     Gastrointestinal Gastrointestinal - GI WDL: all Nausea/Vomiting Signs/Symptoms: nausea intermittent; emesis (X2) All Quadrants Bowel Sounds: audible and active in all quadrants Last Bowel Movement: 03/05/19 Stool Occurrence: 3 Stool Color: brown GI Signs/Symptoms: abdominal pain; abdominal fullness; abdominal discomfort; diarrhea Gastrointestinal Comment: PT COMES IN WITH NAUSEA , VOMITING X2, DIARRHEA X3, LIGHT HEADED AND WEAK.     Tests Performed / Abnormal Results:    Abd/pelvis CT,  IV  contrast only TRAUMA / AAA   Preliminary Result   IMPRESSION:    1. Scattered loops of fluid-filled small and large bowel with mild   prominence of some small bowel loops. No definite bowel obstruction.   Findings could be associated with nonspecific enteritis.   2. Tiny left pleural effusion with adjacent atelectasis.      XR Chest 2 Views     (Results Pending)     Labs Ordered and Resulted from Time of ED Arrival Up to the Time of Departure from the ED   CBC WITH PLATELETS DIFFERENTIAL - Abnormal; Notable for the following components:       Result Value    RDW 16.0 (*)     Platelet Count 114 (*)     Absolute Lymphocytes 0.3 (*)     All other components within normal limits   COMPREHENSIVE METABOLIC PANEL - Abnormal; Notable for the following components:    Glucose 116 (*)     GFR Estimate 50 (*)     GFR Estimate If Black 58 (*)     All other components within normal limits   INR - Abnormal; Notable for the following components:    INR 3.54 (*)     All other components within normal limits   ROUTINE UA WITH MICROSCOPIC - Abnormal; Notable for the following components:    RBC Urine 5 (*)     Bacteria Urine Few (*)     Mucous Urine Present (*)     Hyaline Casts 5 (*)     All other components within normal limits   ISTAT BASIC MET ICA HCT POCT - Abnormal; Notable for the following components:    Glucose 120 (*)     GFR Estimate 52 (*)     All other components within normal limits   LIPASE   TROPONIN I   MAGNESIUM   NT PROBNP INPATIENT   STRAIGHT CATH FOR URINE   ISTAT CREATININE NURSING POCT   CLOSTRIDIUM DIFFICILE TOXIN B   Treatments provided: PIV, LABS, STRAIGHT CATH URINE, ISTAT CREAT, CDIFF, CT XRAY, BP AND PULSE OX MONITORING, TYLENOL, BENTYL, NS BOLUS, SOCIAL WORK CONSULT.  Family Comments: AT BEDSIDE  OBS brochure/video discussed/provided to patient:  YES  ED Medications:   Medications   0.9% sodium chloride BOLUS (500 mLs Intravenous New Bag 3/5/19 1158)   dicyclomine (BENTYL) capsule 10 mg (10 mg Oral Given 3/5/19 1158)   acetaminophen (TYLENOL) tablet 650 mg (650 mg Oral Given 3/5/19 1158)   ondansetron (ZOFRAN) injection 4 mg (4 mg Intravenous Given 3/5/19 1157)   0.9% sodium chloride BOLUS (0 mLs Intravenous Stopped 3/5/19 1257)   iopamidol (ISOVUE-370) solution 500 mL (74 mLs Intravenous Given 3/5/19 1251)     Drips infusing:  Yes  For the  majority of the shift, the patient's behavior Green. Interventions performed were NONE.  Severe Sepsis OR Septic Shock Diagnosis Present: No    ED Nurse Name/Phone Number: Ash Baird RN [phone]  1:30 PM

## 2019-03-05 NOTE — PROGRESS NOTES
ROOM # 231    Living Situation (if not independent, order SW consult):Ind in [facility]  Facility name:  : [facility]    Activity level at baseline: Ind with cane  Activity level on admit: SBA with cane      Patient registered to observation; given Patient Bill of Rights; given the opportunity to ask questions about observation status and their plan of care.  Patient has been oriented to the observation room, bathroom and call light is in place.    Discussed discharge goals and expectations with patient/family.

## 2019-03-05 NOTE — ED PROVIDER NOTES
History     Chief Complaint:    Weakness and Diarrhea    History limited due to poor historian and vague. History provided by the patient.     HPI   Tameka Andersen is a 89 year old female with a history of atrial fibrillation on Coumadin, lung cancer (in remission), CHF, anemia, amongst others, who presents with weakness and diarrhea. The patient reports that for the past 4-5 weeks she has been experiencing shortness of breath, diarrhea, and weakness with abdominal discomfort and feeling dehydrated. She notes that every time she urinates she has a bowel movement, which is roughly 5 times a day. The patient has associated nonproductive cough as well and notes her shortness of breath is worse with walking. Last night, the patient was experiencing palpitations that she felt was her history of atrial fibrillation and details that it lasted into this morning, which is unusual for her. She then had three episodes of emesis followed by diarrhea. The patient denies fever, blood in stool, exposure to anyone sick, or recent antibiotic use. Lastly, she annotates that she was hospitalized over night on Saturday in the hospital for weakness and diarrhea. She also had a recent spontaneous thoracic fracture x2.     Echocardiogram 3/2/19  Interpretation Summary  The visual ejection fraction is estimated at 60-65%.  Left ventricular systolic function is normal.  The ascending aorta is Borderline dilated.  The study was technically difficult.    Allergies:  Thorazine      Medications:    Dulcolax  Meotprolol  Soothe OP  Coumadin    Past Medical History:    Atrial Fibrillation  Weakness  Anemia  arthritis  Lung Cancer  Elbow fracture     Past Surgical History:    Arthroplasty elbow  Cholecystectomy  Tonsillectomy  hysterectomy  Lung surgery     Family History:    Mother: Thrombosis    Social History:  The patient was accompanied to the ED by friends.  Smoking Status: Never Smoker  Smokeless Tobacco: Never Used  Alcohol Use:  Positive  Drug Use: Negative  PCP: Markie Becker   Marital Status:        Review of Systems   Constitutional: Negative for fever.   Respiratory: Positive for cough and shortness of breath.    Cardiovascular: Positive for palpitations.   Gastrointestinal: Positive for abdominal pain, diarrhea, nausea and vomiting. Negative for blood in stool.   Neurological: Positive for weakness.   All other systems reviewed and are negative.    Physical Exam     Patient Vitals for the past 24 hrs:   BP Temp Pulse Heart Rate Resp SpO2   03/05/19 1200 135/86 -- -- 90 21 --   03/05/19 1100 121/70 -- 89 88 30 94 %   03/05/19 1048 -- 99.5  F (37.5  C) -- -- -- --   03/05/19 1047 119/76 -- -- -- -- --   03/05/19 1046 -- -- -- 98 16 96 %      Physical Exam  Nursing note and vitals reviewed.  Constitutional: Well nourished. Resting comfortably.   Eyes: Conjunctiva normal.  Pupils are equal, round, and reactive to light.   ENT: Nose normal. Mucous membranes pink and moist.    Neck: Normal range of motion.  CVS: Normal rate, regular rhythm.  Normal heart sounds.  No murmur.  Pulmonary: Lungs diminished bilaterally  GI: Abdomen soft. Nontender, nondistended. No rigidity or guarding.    MSK: No calf tenderness; trace lower extremity edema  Neuro: Alert. Follows simple commands.  Skin: Skin is warm and dry. No rash noted.   Psychiatric: Normal affect.       Emergency Department Course     ECG:  ECG taken at 1058  Normal sinus rhythm  Normal ECG  Rate 90 bpm. HI interval 168 ms. QRS duration 78 ms. QT/QTc 360/440 ms. P-R-T axes 78 -18 -6.    Imaging:  Radiology findings were communicated with the patient who voiced understanding of the findings.    Abd/pelvis CT,  IV  contrast only TRAUMA / AAA  1. Scattered loops of fluid-filled small and large bowel with mild  prominence of some small bowel loops. No definite bowel obstruction.  Findings could be associated with nonspecific enteritis.  2. Tiny left pleural effusion with adjacent  atelectasis.  LINDA HERNANDES MD  Reading per radiology    XR Chest 2 Views   Mild pleural thickening or pleural effusion left lung  base. Chronic elevation right hemidiaphragm. Mild fibrotic changes in  both lungs. No change compared to 3/2/2019.  SEBAS ZAMORANO MD  Reading per radiology    Laboratory:  Laboratory findings were communicated with the patient who voiced understanding of the findings.    UA: RBC 5 (H), Bacteria few (A), Mucous present (A), Hyaline Casts 5 (H), o/w WNL.  ISTAT basic met ICa hematocrit POCT: Glucose 120 (H), GFR 52 (L), o/w WNL.   CBC: WBC 6.7, HGB 12.7,  (L)  CMP: Glucose 116 (H), GFR 50 (L) o/w WNL (Creatinine 0.96)  Lipase: 94  Troponin (Collected 1150): <0.015  Magnesium: 2.1  BNP: 708  INR: 3.54 (H)  Clostridium Difficle Toxin: Pending    Interventions:  1157 Zofran 4 mg IV  1158 Tylenol 650 mg PO  1158 Bentyl 10 mg PO  1158 500 mL IV     Emergency Department Course:     Nursing notes and vitals reviewed.    1112 I performed an exam of the patient as documented above.     1150 IV was inserted and blood was drawn for laboratory testing, results above.     1150 The patient provided a stool sample for laboratory testing as documented above.     1151 Blood drawn. This was sent to the lab for further testing, results above.     1153 The patient provided a urine sample here in the emergency department. This was sent for laboratory testing, findings above.     1239 Patient rechecked and updated. She is feeling improved.     1251 The patient was sent for a CT while in the emergency department, results above.      1259 I spoke with social work regarding patient's presentation and plan of care.     1335 Patient rechecked and updated. She reports pain improved.       1344 The patient was sent for a XR while in the emergency department, results above.      1345 I spoke with Dr. Tervino for Dr. Subramanian of the hospitalist service from [facility] regarding patient's presentation,  findings, and plan of care.      I personally reviewed the imaging, lab, and EKG results with the patient and answered all related questions prior to discharge.    Impression & Plan      Medical Decision Making:  Tameka Andersen is a 89 year old female with a history of atrial fibrillation, CHF who presents to the emergency department today for evaluation of several complaints, but predominately diarrhea and weakness. The patient is non toxic on arrival and has a non peritoneal abdomin. She underwent an extensive work up in the ED. Labs without evidence of infection or significant electrolyte abnormality. UA without gross infection. C-diff was collected in setting of reported diarrhea, though clinically I have a lower suspicion given no recent antibiotics at this point and time. EKG without underlying arrhythmia or focal ischemia.  Screening troponin negative.  Patient denies chest pain and clinically I doubt ACS.  INR therapeutic, doubt PE. BNP mildy elevated, though the patient is without hypoxia or significant respiratory distress. CT abdomen was completed as well in setting of presentation and showed no bowel obstruction though possible enteritis.  On reevaluation, the patient attempted ambulation and became profoundly weak. The patient lives alone and is not a safe discharge home. We discussed possible long term placement with social work and the patient is amenable at this point and time. She was accepted by hospitalist for admission.     Diagnosis:    ICD-10-CM    1. Debility R53.81    2. Enteritis K52.9    3. Chronic diastolic congestive heart failure (H) I50.32      Disposition:   The patient is admitted into the care of Dr. Subramanian.     Scribe Disclosure:  I, Orla Severson, am serving as a scribe at 11:16 AM on 3/5/2019 to document services personally performed by Cassandra Flores DO based on my observations and the provider's statements to me.     [facility] EMERGENCY DEPARTMENT        Cassandra Flores, DO  03/05/19 1552

## 2019-03-05 NOTE — H&P
[facility]    History and Physical - Hospitalist Service       Date of Admission:  3/5/2019    Assessment & Plan   Tameka Andersen is a 89 year old female with a PMH including atrial fibrillation (on Coumadin), lung cancer (s/p wedge resection w/o further treatment), and thoracic compression fracture (1/2019), recent observation stay for progressive diffuse weakness and shortness of breath 3/2/2019-3/3/2019 with unrevealing work up and discharged home with home RN/PT re-registered to observation on 3/5/2019 with complaints of ongoing watery diarrhea, shortness of breath, palpitations, lightheadedness, and fatigue; she also voices that she believes at this time she needs a higher level of care (lives independently in a town home).    In the ER, VSS.  ECG demonstrated normal sinus rhythm.  CMP, CBC with differential, lipase, BNP, troponin, and UA unremarkable.  INR slightly supratherapeutic at 3.54.  C. difficile testing pending at time of admission.  Chest x-ray demonstrated mild pleural thickening versus pleural effusion of the left lung base and chronically elevated right hemidiaphragm, mild fibrotic changes in both lungs but no acute change compared to 3/2/2019.  CT of the abdomen and pelvis demonstrated scattered loops of fluid-filled small and large bowel with mild prominence of some small bowel loops; no definite bowel obstruction ?c/w nonspecific enteritis; tiny left-sided pleural effusion with adjacent atelectasis noted.    1. Nausea, vomiting, diarrhea: Patient reports has been having ongoing watery diarrhea for several weeks. CT of abd/pelvis today w/ contrast shows evidence of enteritis, no transition point or visualized bowel obstruction. Per discharge summary on 3/3/19, she reported at that time that her stools had been loose, but not watery, and she wanted to discharge home rather than continuing monitoring in the hospital. Diarrhea has been associated with general sense of bloating and  malaise with poor appetite acutely worsened over the past 48 hours; had vomiting last night in setting of SOB with palpitations; no fever, chills, abdominal pain, hematemesis, or blood per rectum. She has received an outpatient referral for GI ([facility]) through her PCP ([facility] No recent travel, antibiotics, sick contacts, or ill-prepared foods. Recent hospital stay 3/2/19-3/3/19 so theoretically at higher risk for C diff infection.   -C diff and enteric panel testing  -Supportive cares, prn tylenol and antiemetics, prn bentyl  -Gentle IVF LR 75 ml/hr x 10 hours (echo 3/3/19 EF 60-65%)  -No imodium unless stool studies negative  -Outpatient follow up with GI referral placed by PCP if work up here negative    2. SOB, lightheadedness, palpitations: Has been having increasing shortness of breath with exertion worse over the past month, similar to previous admission.  No cough or fever or chills.  No chest pain.  Chest x-ray demonstrated a very small right-sided pleural effusion again, similar to previous admission.  Lungs otherwise clear.  Has had orthopnea for 6-8 months.  TTE obtained on last visit (3/3/2019) demonstrated normal LV systolic function with EF ~60-65%, septal motion c/w conduction abnormality. Likely deconditioned, particularly with recent thoracic fracture which probably limited her mobility. Has known history of atrial fibrillation, ECG today shows NSR. Will monitor HR overnight on telemetry to see if SOB symptomatically correlates with any arrhythmia.  -Orthostatics  -Telemetry monitoring overnight  -Continue PTA metoprolol 25 mg BID with hold parameters  -Incentive spirometry given small amount of atelectasis seen in left lung base on CT abd/pelvis    3. Diffuse weakness: Slow decline in her mobility over the past several months. Likely exacerbated by recent thoracic compression fracture as well as ongoing diarrhea with nausea and poor appetite. No focal deficits to raise  concern for stroke. UA negative appearing. No leukocytosis or SHADY. SHe had been set up with home RN/PT on discharge 3/3/19 but they had not arranged a time to come see her yet.  -Monitor on telemetry  -Stool studies, supportive cares with gentle IVF hydration  -PT consult  -SW consult    4. Atrial fibrillation: INR slightly supra-therapeutic on admission. Patient on metoprolol and Coumadin. NSR on ECG in the ER.  -Monitor on telemetry  -Pharmacy consulted to dose coumadin  -Continue PTA metoprolol 25 mg BID with hold parameters    5. Recent thoracic compression fracture: Now taking only tylenol for pain. Does not have pain unless up on her feet for too long.    6. Remote history of lung cancer: Diagnosed in 2012. Had wide resection and no exactly sure what area. Did not require chemotherapy or radiation.     Diet: Advance Diet as Tolerated: Clear Liquid Diet    DVT Prophylaxis: On coumadin  Scruggs Catheter: not present  Code Status: DNR/DNI      Disposition Plan   Expected discharge: Tomorrow , patient hopeful for higher level of care once safe disposition plan/ TCU bed available.  Entered: Love Trevino PA-C 03/05/2019, 3:53 PM     The patient's care was discussed with the Bedside Nurse, Patient and Patient's 2 supportive friends from [employer].    Love Trevino PA-C  [facility]    ______________________________________________________________________    Chief Complaint   Diarrhea, weakness    History is obtained from the patient    History of Present Illness   Tameka Andersen is a 89 year old female with a PMH including atrial fibrillation (on Coumadin), lung cancer (s/p wedge resection w/o further treatment), and thoracic compression fracture (1/2019), recent observation stay for progressive diffuse weakness and shortness of breath 3/2/2019-3/3/2019 with unrevealing work up and discharged home with home RN/PT re-registered to observation on 3/5/2019 with complaints of ongoing watery diarrhea,  shortness of breath, palpitations, lightheadedness, and fatigue; she also voices that she believes at this time she needs a higher level of care (lives independently in a town home).    The patient reports that she saw her primary care doctor on 1/17/2018 due to severe back pain; x-ray showed thoracic compression fractures.  Her PCP wanted her to have a procedure to fix this, but bone scan at [facility] indicated that her bones are too severe to have this procedure done.  She had home health care with RN, home health aide, and physical therapist, which finished approximately 2 weeks ago.  She was treated with pain medications for her compression fracture, but now the pain has improved to the point that it only is bothersome if she is up on her feet for a long period of time.  She only takes Tylenol for that pain.    Over the past few months, she is becoming progressively weaker overall.  She states that it acutely worsened after she was diagnosed with a thoracic compression fractures.  She reports that very poor appetite and minimal stamina.  She lives independently in a town home.  She was registered to observation from 3/2/2019 to 3/3/2019 for further workup.  Echocardiogram was obtained for reports of shortness of breath, and estimated ejection fraction was 60-65% with no significant valve disease.  Her workup in the hospital was unrevealing.  She felt better the next morning.  She ambulated well, so physical therapy evaluation was not obtained during that hospital stay, and she was hopeful to discharge home.  She did state that she was having some loose stool at that time, but denied a watery consistency to it and stated that it has been going on since prior to coming into the hospital.  She reluctantly accepted home nursing and PT referrals for discharge home.      However, she continued to have episodes of diarrhea that she now describes as watery, having around 6-10 episodes a day.  Last evening she experienced  "an episode where she \"felt like she was in atrial fibrillation,\" which she describes as feeling palpitations, shortness of breath, and an impending sense of doom without chest pain or diaphoresis.  She did have several episodes of nausea with vomiting during that time.  This sensation persisted for several hours and then eventually resolved.  With ongoing weakness and diarrhea this morning, she spoke to 1 of her friends who encouraged her to seek medical evaluation.  She arrived at [facility] for evaluation with 2 of her friends from [facility].  Of note, home nursing ordered from last hospitalization was supposed to stop by on 3/8/19 for the first time.    She continues to report a general sense of malaise, distention, and a very dry mouth, similar to her previous admission note.  She states that the amount of diarrhea she is having is making her too weak to do anything.  she continues to deny fever, chills, abdominal pain, hematemesis, or bright red blood per rectum.  No recent travel.  No recent known ill contacts.  No recent antibiotic use.  No out of the ordinary meals recently.  Her friends at bedside endorse the patient's voiced concerns about living at home independently in her current state.    Review of Systems    The 10 point Review of Systems is negative other than noted in the HPI.    Past Medical History    I have reviewed this patient's medical history and updated it with pertinent information if needed.   Past Medical History:   Diagnosis Date     A-fib (H)      Anemia      Arthritis     osteoarthritis     Cancer (H)     lung       Past Surgical History   I have reviewed this patient's surgical history and updated it with pertinent information if needed.  Past Surgical History:   Procedure Laterality Date     ARTHROPLASTY ELBOW Left 6/12/2018    Procedure: ARTHROPLASTY ELBOW;  1.  Left total elbow arthroplasty using the Jeanne Coonrad/Morrey total elbow with a size small cemented ulna and a small " cemented humerus.   2.  Open reduction internal fixation olecranon fracture using a tension band technique.   3.  Subcutaneous transposition ulnar nerve, left elbow. ;  Surgeon: Laci Panchal MD;  Location: [facility] OR     CHOLECYSTECTOMY       ENT SURGERY      tonsillectomy     GYN SURGERY      hysterectomy     LUNG SURGERY         Social History   I have reviewed this patient's social history and updated it with pertinent information if needed.  Social History     Tobacco Use     Smoking status: Never Smoker     Smokeless tobacco: Never Used   Substance Use Topics     Alcohol use: Yes     Comment: twice a month     Drug use: No       Family History   I have reviewed this patient's family history and updated it with pertinent information if needed.   Family History   Problem Relation Age of Onset     Thrombosis Mother      Other - See Comments Father         appendicitis       Prior to Admission Medications   Prior to Admission Medications   Prescriptions Last Dose Informant Patient Reported? Taking?   ACETAMINOPHEN PO   Yes Yes   Sig: Take 1,000 mg by mouth every 6 hours as needed for pain   Bisacodyl (DULCOLAX PO)   Yes Yes   Sig: Take by mouth daily as needed   METOPROLOL TARTRATE PO 3/4/2019 at Unknown time  Yes Yes   Sig: Take 25 mg by mouth 2 times daily    Propylene Glycol-Glycerin (SOOTHE OP) 3/4/2019 at Unknown time  Yes Yes   Sig: Apply 1 drop to eye 2 times daily   cholecalciferol (VITAMIN D3) 5000 units TABS tablet 3/4/2019 at Unknown time  Yes Yes   Sig: Take 5,000 Units by mouth daily   warfarin (COUMADIN) 5 MG tablet 3/3/2019 at Unknown time  Yes Yes   Sig: Take  5 mg on Tues, Thurs, Sat, and Sun   warfarin (COUMADIN) 5 MG tablet 3/4/2019 at 2.5mg  Yes Yes   Sig: Take 2.5 mg by mouth Take 2.5 mg on Mond, Wed and Fri      Facility-Administered Medications: None     Allergies   Allergies   Allergen Reactions     Thorazine [Chlorpromazine Hcl]        Physical Exam   Vital Signs: Temp: 97.3  F (36.3  C)  Temp src: Axillary BP: 128/57 Pulse: 82 Heart Rate: 94 Resp: 18 SpO2: 94 % O2 Device: None (Room air)    Weight: 134 lbs 8 oz    GENERAL:  Comfortable. Appears younger than stated age  PSYCH: pleasant, oriented, No acute distress.  HEENT:  Atraumatic, normocephalic. PERRLA. Normal conjunctiva, normal hearing, and oropharynx is normal.  NECK:  Supple, no neck vein distention, adenopathy or bruits, normal thyroid.  HEART:  Normal S1, S2 with no murmur, no pericardial rub, gallops or S3 or S4.  LUNGS:  Clear to auscultation, normal respiratory effort. No wheezing, rales or ronchi.  GI:  Soft, no hepatosplenomegaly, normal bowel sounds. Non-tender, non distended.   EXTREMITIES:  No pedal edema, +2 pulses bilateral and equal.  SKIN:  Dry to touch, No rash, wound or ulcerations.  NEUROLOGIC:  CN 2-12 intact, BL 5/5 symmetric upper and lower extremity strength, sensation is intact with no focal deficits.     Data   Data reviewed today: I reviewed all medications, new labs and imaging results over the last 24 hours. I personally reviewed:    Results for orders placed or performed during the hospital encounter of 03/05/19   Abd/pelvis CT,  IV  contrast only TRAUMA / AAA    Narrative    CT ABDOMEN AND PELVIS WITH CONTRAST   3/5/2019 1:05 PM     HISTORY: Abd pain, unspecified    TECHNIQUE:   CT of the abdomen and pelvis was performed following the  administration of 74mL Isovue-370. Radiation dose for this scan was  reduced using automated exposure control, adjustment of the mA and/or  kV according to patient size, or iterative reconstruction technique.    COMPARISON: CT of the abdomen and pelvis dated 8/13/2008.    FINDINGS: The solid organs in the abdomen appear unremarkable. There  are scattered fluid-filled loops of colon and small bowel. There are  mildly prominent loops of small bowel but no definite evidence for  bowel obstruction. No free fluid or free air in the abdomen or pelvis.    The lower aspects of the thorax  are included on this exam. There is  pectus excavatum. There is a tiny left pleural effusion with adjacent  atelectasis. There is elevation of the right hemidiaphragm.      Impression    IMPRESSION:   1. Scattered loops of fluid-filled small and large bowel with mild  prominence of some small bowel loops. No definite bowel obstruction.  Findings could be associated with nonspecific enteritis.  2. Tiny left pleural effusion with adjacent atelectasis.    LINDA HERNANDES MD   XR Chest 2 Views    Narrative    XR CHEST 2 VW 3/5/2019 1:52 PM    HISTORY: sob      Impression    IMPRESSION: Mild pleural thickening or pleural effusion left lung  base. Chronic elevation right hemidiaphragm. Mild fibrotic changes in  both lungs. No change compared to 3/2/2019.    SEBAS ZAMORANO MD   CBC with platelets differential   Result Value Ref Range    WBC 6.7 4.0 - 11.0 10e9/L    RBC Count 4.16 3.8 - 5.2 10e12/L    Hemoglobin 12.7 11.7 - 15.7 g/dL    Hematocrit 38.7 35.0 - 47.0 %    MCV 93 78 - 100 fl    MCH 30.5 26.5 - 33.0 pg    MCHC 32.8 31.5 - 36.5 g/dL    RDW 16.0 (H) 10.0 - 15.0 %    Platelet Count 114 (L) 150 - 450 10e9/L    Diff Method Automated Method     % Neutrophils 80.2 %    % Lymphocytes 4.9 %    % Monocytes 13.8 %    % Eosinophils 0.1 %    % Basophils 0.1 %    % Immature Granulocytes 0.9 %    Nucleated RBCs 0 0 /100    Absolute Neutrophil 5.4 1.6 - 8.3 10e9/L    Absolute Lymphocytes 0.3 (L) 0.8 - 5.3 10e9/L    Absolute Monocytes 0.9 0.0 - 1.3 10e9/L    Absolute Eosinophils 0.0 0.0 - 0.7 10e9/L    Absolute Basophils 0.0 0.0 - 0.2 10e9/L    Abs Immature Granulocytes 0.1 0 - 0.4 10e9/L    Absolute Nucleated RBC 0.0    Comprehensive metabolic panel   Result Value Ref Range    Sodium 134 133 - 144 mmol/L    Potassium 3.9 3.4 - 5.3 mmol/L    Chloride 101 94 - 109 mmol/L    Carbon Dioxide 24 20 - 32 mmol/L    Anion Gap 9 3 - 14 mmol/L    Glucose 116 (H) 70 - 99 mg/dL    Urea Nitrogen 26 7 - 30 mg/dL    Creatinine 0.96 0.52 -  1.04 mg/dL    GFR Estimate 50 (L) >60 mL/min/[1.73_m2]    GFR Estimate If Black 58 (L) >60 mL/min/[1.73_m2]    Calcium 9.0 8.5 - 10.1 mg/dL    Bilirubin Total 0.6 0.2 - 1.3 mg/dL    Albumin 3.6 3.4 - 5.0 g/dL    Protein Total 7.8 6.8 - 8.8 g/dL    Alkaline Phosphatase 75 40 - 150 U/L    ALT 19 0 - 50 U/L    AST 21 0 - 45 U/L   Lipase   Result Value Ref Range    Lipase 94 73 - 393 U/L   Troponin I   Result Value Ref Range    Troponin I ES <0.015 0.000 - 0.045 ug/L   Magnesium   Result Value Ref Range    Magnesium 2.1 1.6 - 2.3 mg/dL   BNP   Result Value Ref Range    N-Terminal Pro BNP Inpatient 708 0 - 1,800 pg/mL   INR   Result Value Ref Range    INR 3.54 (H) 0.86 - 1.14   UA with Microscopic   Result Value Ref Range    Color Urine Yellow     Appearance Urine Clear     Glucose Urine Negative NEG^Negative mg/dL    Bilirubin Urine Negative NEG^Negative    Ketones Urine Negative NEG^Negative mg/dL    Specific Gravity Urine 1.023 1.003 - 1.035    Blood Urine Negative NEG^Negative    pH Urine 5.0 5.0 - 7.0 pH    Protein Albumin Urine Negative NEG^Negative mg/dL    Urobilinogen mg/dL 0.0 0.0 - 2.0 mg/dL    Nitrite Urine Negative NEG^Negative    Leukocyte Esterase Urine Negative NEG^Negative    Source Catheterized Urine     WBC Urine 1 0 - 5 /HPF    RBC Urine 5 (H) 0 - 2 /HPF    Bacteria Urine Few (A) NEG^Negative /HPF    Squamous Epithelial /HPF Urine <1 0 - 1 /HPF    Mucous Urine Present (A) NEG^Negative /LPF    Hyaline Casts 5 (H) 0 - 2 /LPF   EKG 12 lead   Result Value Ref Range    Interpretation ECG Click View Image link to view waveform and result    ISTAT Basic Met ICa HCT POCT   Result Value Ref Range    Sodium 135 133 - 144 mmol/L    Potassium 3.9 3.4 - 5.3 mmol/L    Chloride 98 94 - 109 mmol/L    Total CO2 24 20 - 32 mmol/L    Anion Gap 13 6 - 17 mmol/L    Glucose 120 (H) 70 - 99 mg/dL    Urea Nitrogen 26 7 - 30 mg/dL    Creatinine 1.0 0.52 - 1.04 mg/dL    GFR Estimate 52 (L) >60 mL/min/[1.73_m2]    GFR Estimate  If Black 62 >60 mL/min/[1.73_m2]    Calcium Ionized 4.4 4.4 - 5.2 mg/dL    Hemoglobin 13.3 11.7 - 15.7 g/dL    Hematocrit - POCT 39 35.0 - 47.0 %PCV   Clostridium difficile toxin B PCR   Result Value Ref Range    Specimen Description Feces     C Diff Toxin B PCR Negative NEG^Negative

## 2019-03-05 NOTE — PHARMACY-ADMISSION MEDICATION HISTORY
Admission medication history interview status for this patient is complete. See [facility] admission navigator for allergy information, prior to admission medications and immunization status.     Medication history interview source(s):Patient  Medication history resources (including written lists, pill bottles, clinic record):None  Primary pharmacy:[facility]    Changes made to PTA medication list:  Added: none  Deleted: none  Changed: none    Actions taken by pharmacist (provider contacted, etc):None     Additional medication history information: recently discharged from observation here a few days ago    Medication reconciliation/reorder completed by provider prior to medication history? No    Do you take OTC medications (eg tylenol, ibuprofen, fish oil, eye/ear drops, etc)? Y(Y/N)    For patients on insulin therapy: N (Y/N)  Lantus/levemir/NPH/Mix 70/30 dose:   (Y/N) (see Med list for doses)   Sliding scale Novolog Y/N  If Yes, do you have a baseline novolog pre-meal dose:  units with meals  Patients eat three meals a day:   Y/N    How many episodes of hypoglycemia do you have per week: _______  How many missed doses do you have per week: ______  How many times do you check your blood glucose per day: _______  Do you have a Continuous glucose monitor (CGM)   Y/N (remind pt that not approved for hospital use)   Any Barriers to therapy - Be specific :  cost of medications, comfortable with giving injections (if applicable), comfortable and confident with current diabetes regimen: Y/N ______________      Prior to Admission medications    Medication Sig Last Dose Taking? Auth Provider   ACETAMINOPHEN PO Take 1,000 mg by mouth every 6 hours as needed for pain  Yes Reported, Patient   Bisacodyl (DULCOLAX PO) Take by mouth daily as needed  Yes Unknown, Entered By History   cholecalciferol (VITAMIN D3) 5000 units TABS tablet Take 5,000 Units by mouth daily 3/4/2019 at Unknown time Yes Unknown, Entered By History   METOPROLOL  TARTRATE PO Take 25 mg by mouth 2 times daily  3/4/2019 at Unknown time Yes Reported, Patient   Propylene Glycol-Glycerin (SOOTHE OP) Apply 1 drop to eye 2 times daily 3/4/2019 at Unknown time Yes Unknown, Entered By History   warfarin (COUMADIN) 5 MG tablet Take  5 mg on Tues, Thurs, Sat, and Sun 3/3/2019 at Unknown time Yes Unknown, Entered By History   warfarin (COUMADIN) 5 MG tablet Take 2.5 mg by mouth Take 2.5 mg on Mond, Wed and Fri 3/4/2019 at 2.5mg Yes Unknown, Entered By History

## 2019-03-06 VITALS
OXYGEN SATURATION: 92 % | DIASTOLIC BLOOD PRESSURE: 64 MMHG | WEIGHT: 137 LBS | RESPIRATION RATE: 18 BRPM | BODY MASS INDEX: 28.76 KG/M2 | HEIGHT: 58 IN | HEART RATE: 82 BPM | TEMPERATURE: 98.4 F | SYSTOLIC BLOOD PRESSURE: 120 MMHG

## 2019-03-06 LAB
ANION GAP SERPL CALCULATED.3IONS-SCNC: 4 MMOL/L (ref 3–14)
ANISOCYTOSIS BLD QL SMEAR: SLIGHT
BASOPHILS # BLD AUTO: 0 10E9/L (ref 0–0.2)
BASOPHILS NFR BLD AUTO: 0 %
BUN SERPL-MCNC: 19 MG/DL (ref 7–30)
C COLI+JEJUNI+LARI FUSA STL QL NAA+PROBE: NOT DETECTED
CALCIUM SERPL-MCNC: 8.5 MG/DL (ref 8.5–10.1)
CHLORIDE SERPL-SCNC: 105 MMOL/L (ref 94–109)
CO2 SERPL-SCNC: 27 MMOL/L (ref 20–32)
CREAT SERPL-MCNC: 0.97 MG/DL (ref 0.52–1.04)
DIFFERENTIAL METHOD BLD: ABNORMAL
EC STX1 GENE STL QL NAA+PROBE: NOT DETECTED
EC STX2 GENE STL QL NAA+PROBE: NOT DETECTED
ENTERIC PATHOGEN COMMENT: ABNORMAL
EOSINOPHIL # BLD AUTO: 0 10E9/L (ref 0–0.7)
EOSINOPHIL NFR BLD AUTO: 0 %
ERYTHROCYTE [DISTWIDTH] IN BLOOD BY AUTOMATED COUNT: 16.4 % (ref 10–15)
GFR SERPL CREATININE-BSD FRML MDRD: 50 ML/MIN/{1.73_M2}
GLUCOSE SERPL-MCNC: 83 MG/DL (ref 70–99)
HCT VFR BLD AUTO: 37.1 % (ref 35–47)
HGB BLD-MCNC: 11.6 G/DL (ref 11.7–15.7)
INR PPP: 4.73 (ref 0.86–1.14)
LYMPHOCYTES # BLD AUTO: 0.6 10E9/L (ref 0.8–5.3)
LYMPHOCYTES NFR BLD AUTO: 13 %
MCH RBC QN AUTO: 29.9 PG (ref 26.5–33)
MCHC RBC AUTO-ENTMCNC: 31.3 G/DL (ref 31.5–36.5)
MCV RBC AUTO: 96 FL (ref 78–100)
MONOCYTES # BLD AUTO: 1.7 10E9/L (ref 0–1.3)
MONOCYTES NFR BLD AUTO: 38 %
NEUTROPHILS # BLD AUTO: 2.3 10E9/L (ref 1.6–8.3)
NEUTROPHILS NFR BLD AUTO: 49 %
NOROV GI+II ORF1-ORF2 JNC STL QL NAA+PR: ABNORMAL
PLATELET # BLD AUTO: 100 10E9/L (ref 150–450)
PLATELET # BLD EST: ABNORMAL 10*3/UL
POTASSIUM SERPL-SCNC: 3.9 MMOL/L (ref 3.4–5.3)
RBC # BLD AUTO: 3.88 10E12/L (ref 3.8–5.2)
RVA NSP5 STL QL NAA+PROBE: NOT DETECTED
SALMONELLA SP RPOD STL QL NAA+PROBE: NOT DETECTED
SHIGELLA SP+EIEC IPAH STL QL NAA+PROBE: NOT DETECTED
SODIUM SERPL-SCNC: 136 MMOL/L (ref 133–144)
V CHOL+PARA RFBL+TRKH+TNAA STL QL NAA+PR: NOT DETECTED
VARIANT LYMPHS BLD QL SMEAR: PRESENT
WBC # BLD AUTO: 4.6 10E9/L (ref 4–11)
Y ENTERO RECN STL QL NAA+PROBE: NOT DETECTED

## 2019-03-06 PROCEDURE — 96361 HYDRATE IV INFUSION ADD-ON: CPT

## 2019-03-06 PROCEDURE — 99217 ZZC OBSERVATION CARE DISCHARGE: CPT | Performed by: HOSPITALIST

## 2019-03-06 PROCEDURE — 25000132 ZZH RX MED GY IP 250 OP 250 PS 637: Mod: GY | Performed by: PHYSICIAN ASSISTANT

## 2019-03-06 PROCEDURE — 80048 BASIC METABOLIC PNL TOTAL CA: CPT | Performed by: PHYSICIAN ASSISTANT

## 2019-03-06 PROCEDURE — 85610 PROTHROMBIN TIME: CPT | Performed by: PHYSICIAN ASSISTANT

## 2019-03-06 PROCEDURE — G0378 HOSPITAL OBSERVATION PER HR: HCPCS

## 2019-03-06 PROCEDURE — A9270 NON-COVERED ITEM OR SERVICE: HCPCS | Mod: GY | Performed by: PHYSICIAN ASSISTANT

## 2019-03-06 PROCEDURE — 36415 COLL VENOUS BLD VENIPUNCTURE: CPT | Performed by: PHYSICIAN ASSISTANT

## 2019-03-06 PROCEDURE — 85025 COMPLETE CBC W/AUTO DIFF WBC: CPT | Performed by: PHYSICIAN ASSISTANT

## 2019-03-06 RX ADMIN — METOPROLOL TARTRATE 25 MG: 25 TABLET ORAL at 08:26

## 2019-03-06 ASSESSMENT — MIFFLIN-ST. JEOR: SCORE: 906.18

## 2019-03-06 NOTE — CONSULTS
Care Transition Initial Assessment - SW     Met with: Patient  Active Problems:    Gastroenteritis       DATA  Lives With: alone. Pt resides in a townhome in [place]     Quality of Family Relationships: non-existent  Description of Support System: Supportive, Involved  Who is your support system?: Other (specify)(Friends, neighbors, [employer] members).   Support Assessment: Adequate family and caregiver support, Adequate social supports.   Identified issues/concerns regarding health management: Pt resides in a townhome, lives alone. At baseline, she ambulates without any assistive devices, and is independent with ADL's. Her friends assist her with driving to/from appointments, preparing some meals, etc. Pt reports that there is 4 flights of stairs total to enter her town home as well as within. She takes medications independently from the bottle. Pt does not have a life alert button, and denies any falls at home. During previous overnight hospital stay earlier this week, a referral was made to [facility] for RN & PT.     Quality of Family Relationships: non-existent. Pt reports that some extended family reside out of state, and that none of her immediate family members are still living. Her neighbors and friends from [employer] have been her source of support.      ASSESSMENT  Cognitive Status:  awake, alert and oriented  Concerns to be addressed: Discharge planning. Pt reports that her friend Gordy wants her to go to a TCU before returning home to gain some strength and ensure her safety at home. Pt is agreeable to TCU and prefers TCU placement prior to returning home. PT to evaluate pt today and make discharge recommendations. SW discussed not having a qualifying medicare inpatient stay, and a TCU stay being $6986-2361 upfront, and an approximate $300-400/day. Pt was agreeable to paying privately for a TCU.      PLAN  Financial costs for the patient includes: Private pay for TCU if recommended by PT.  Patient  given options and choices for discharge Yes.  Patient/family is agreeable to the plan?  Yes.  Patient Goals and Preferences: TCU, then return home with [facility] PT & RN. SW to be added to HC orders at discharge to assist with long-range planning for alternative living arrangements.  Patient anticipates discharging to:  TBD-awaiting PT eval. Pt prefers TCU and is agreeable to paying privately.

## 2019-03-06 NOTE — PLAN OF CARE
PRIMARY DIAGNOSIS: GASTROENTERITIS / GENERALIZED WEAKNESS    OUTPATIENT/OBSERVATION GOALS TO BE MET BEFORE DISCHARGE  1. Orthostatic performed: Yes:          Lying Orthostatic BP: 132/85         Sitting Orthostatic BP: 133/60         Standing Orthostatic BP: 123/61     2. Tolerating PO fluid and/or antibiotics (if applicable): Tolerating full liquids     3. Nausea/Vomiting/Diarrhea symptoms improved: yes, still having a few loose stools this morning but is improving. Denies N/V.     4. Pain status: denies pain     5. Return to near baseline physical activity: Yes    Discharge Planner Nurse   Safe discharge environment identified: Yes   Barriers to discharge: Yes       Entered by: Kervin Orellana 03/06/2019      AOx4, calling appropriately. VSS. Denies pain. Positive for norovirus. Fluids encouraged. Tolerating full liquids; advanced as tolerated. Tele = NSR HR 80's. Patient reports feeling better. SBA for safety. Will ambulate in hallway this morning. Enteric iso.     Please review provider order for any additional goals.   Nurse to notify provider when observation goals have been met and patient is ready for discharge.

## 2019-03-06 NOTE — PROGRESS NOTES
"Pt ambulated in the hallway SBA with gait belt and walker.Steady gait, denies dizziness.Pt stated feeling \"better\".  "

## 2019-03-06 NOTE — PHARMACY - DISCHARGE MEDICATION RECONCILIATION
Clinical Pharmacy- Warfarin Discharge Note    MD ordered INR daily until stable. HOLD COUMADIN UNTIL INR <3.      Anticoagulation Dose History     Recent Dosing and Labs Latest Ref Rng & Units 6/13/2018 6/14/2018 6/15/2018 3/2/2019 3/3/2019 3/5/2019 3/6/2019    Warfarin 1 mg - - - - - - 1 mg -    Warfarin 2 mg - - - - 4 mg - - -    Warfarin 5 mg - 5 mg 5 mg - - - - -    INR 0.86 - 1.14 1.32(H) 1.36(H) 1.43(H) 2.86(H) 2.65(H) 3.54(H) 4.73(H)

## 2019-03-06 NOTE — PLAN OF CARE
VS: WDL  Orientation: WDL  Tele: SR  Glucose checks: NA  Activity: SBA  Diet: fulls ADAT regular  GI: diarrhea  : WDL  Respiratory: WDL  IV: SL  Plan: enteric panel positive for norovirus

## 2019-03-06 NOTE — DISCHARGE SUMMARY
[facility]  Hospitalist Discharge Summary       Date of Admission:  3/5/2019  Date of Discharge:  3/6/2019  Discharging Provider: Timothy Subramanian MD      Discharge Diagnoses   Weakness, norovirus    Follow-ups Needed After Discharge   Follow-up Appointments     Follow Up and recommended labs and tests      Follow up with [facility] physician.  The following labs/tests are   recommended: INR daily until stable. HOLD COUMADIN UNTIL INR <3.             Unresulted Labs Ordered in the Past 30 Days of this Admission     No orders found for last 61 day(s).      These results will be followed up by NA    Discharge Disposition   Discharged to short-term care facility  Condition at discharge: Stable    Hospital Course      Tameka Andersen is a 89 year old female with a PMH including atrial fibrillation (on Coumadin), lung cancer (s/p wedge resection w/o further treatment), and thoracic compression fracture (1/2019), recent observation stay for progressive diffuse weakness and shortness of breath 3/2/2019-3/3/2019 with unrevealing work up and discharged home with home RN/PT re-registered to observation on 3/5/2019 with complaints of ongoing watery diarrhea, shortness of breath, palpitations, lightheadedness, and fatigue; she also voices that she believes at this time she needs a higher level of care (lives independently in a town home).    In the ER, VSS.  ECG demonstrated normal sinus rhythm.  CMP, CBC with differential, lipase, BNP, troponin, and UA unremarkable.  INR slightly supratherapeutic at 3.54.  C. difficile testing pending at time of admission.  Chest x-ray demonstrated mild pleural thickening versus pleural effusion of the left lung base and chronically elevated right hemidiaphragm, mild fibrotic changes in both lungs but no acute change compared to 3/2/2019.  CT of the abdomen and pelvis demonstrated scattered loops of fluid-filled small and large bowel with mild prominence of some small bowel  loops; no definite bowel obstruction ?c/w nonspecific enteritis; tiny left-sided pleural effusion with adjacent atelectasis noted.    1. Nausea, vomiting, diarrhea: Patient reports has been having ongoing watery diarrhea for several weeks. CT of abd/pelvis today w/ contrast shows evidence of enteritis, no transition point or visualized bowel obstruction. Per discharge summary on 3/3/19, she reported at that time that her stools had been loose, but not watery, and she wanted to discharge home rather than continuing monitoring in the hospital. Diarrhea has been associated with general sense of bloating and malaise with poor appetite acutely worsened over the past 48 hours; had vomiting last night in setting of SOB with palpitations; no fever, chills, abdominal pain, hematemesis, or blood per rectum. She has received an outpatient referral for GI ([facility]) through her PCP (Dr. Becker - [facility]). No recent travel, antibiotics, sick contacts, or ill-prepared foods. Recent hospital stay 3/2/19-3/3/19 so theoretically at higher risk for C diff infection.   -C diff and enteric panel testing  -Supportive cares, prn tylenol and antiemetics, prn bentyl  -Gentle IVF LR 75 ml/hr x 10 hours (echo 3/3/19 EF 60-65%)  -No imodium unless stool studies negative  -Outpatient follow up with GI referral placed by PCP if work up here negative    2. SOB, lightheadedness, palpitations: Has been having increasing shortness of breath with exertion worse over the past month, similar to previous admission.  No cough or fever or chills.  No chest pain.  Chest x-ray demonstrated a very small right-sided pleural effusion again, similar to previous admission.  Lungs otherwise clear.  Has had orthopnea for 6-8 months.  TTE obtained on last visit (3/3/2019) demonstrated normal LV systolic function with EF ~60-65%, septal motion c/w conduction abnormality. Likely deconditioned, particularly with recent thoracic fracture which probably  limited her mobility. Has known history of atrial fibrillation, ECG today shows NSR. Will monitor HR overnight on telemetry to see if SOB symptomatically correlates with any arrhythmia.  -Orthostatics  -Telemetry monitoring overnight  -Continue PTA metoprolol 25 mg BID with hold parameters  -Incentive spirometry given small amount of atelectasis seen in left lung base on CT abd/pelvis    3. Diffuse weakness: Slow decline in her mobility over the past several months. Likely exacerbated by recent thoracic compression fracture as well as ongoing diarrhea with nausea and poor appetite. No focal deficits to raise concern for stroke. UA negative appearing. No leukocytosis or SHADY. SHe had been set up with home RN/PT on discharge 3/3/19 but they had not arranged a time to come see her yet.  -Monitor on telemetry  -Stool studies, supportive cares with gentle IVF hydration  -PT consult  -SW consult    4. Atrial fibrillation: INR slightly supra-therapeutic on admission. Patient on metoprolol and Coumadin. NSR on ECG in the ER.  -Monitor on telemetry  -Pharmacy consulted to dose coumadin  -Continue PTA metoprolol 25 mg BID with hold parameters    5. Recent thoracic compression fracture: Now taking only tylenol for pain. Does not have pain unless up on her feet for too long.    6. Remote history of lung cancer: Diagnosed in 2012. Had wide resection and no exactly sure what area. Did not require chemotherapy or radiation.    Patient was admitted to the Obs Unit. She has Norovirus. She is still having diarrhea but feels better. She has decided to go to [facility] for a TCU stay. Her friend is here and will take her. She is otherwise doing well and tolerating PO.  Her INR is elevated so her coumadin will be held until it is therapeutic again.    Consultations This Hospital Stay   SOCIAL WORK IP CONSULT  PHARMACY TO DOSE WARFARIN  SOCIAL WORK IP CONSULT  PHYSICAL THERAPY ADULT IP CONSULT  PHYSICAL THERAPY ADULT IP  CONSULT  OCCUPATIONAL THERAPY ADULT IP CONSULT    Code Status   DNR/DNI    Time Spent on this Encounter   I, Timothy Subramanian, personally saw the patient today and spent greater than 30 minutes discharging this patient.       Timothy Subramanian MD  [facility]  ______________________________________________________________________    Physical Exam   Vital Signs: Temp: 98.4  F (36.9  C) Temp src: Oral BP: 127/74 Pulse: 82 Heart Rate: 92 Resp: 18 SpO2: 92 % O2 Device: None (Room air)    Weight: 137 lbs 0 oz  Constitutional: awake, alert, cooperative, no apparent distress, and appears stated age  Respiratory: No increased work of breathing, good air exchange, clear to auscultation bilaterally, no crackles or wheezing  Cardiovascular: Normal apical impulse, regular rate and rhythm, normal S1 and S2, no S3 or S4, and no murmur noted  GI: No scars, normal bowel sounds, soft, non-distended, non-tender, no masses palpated, no hepatosplenomegally       Primary Care Physician   Markie Becker    Immunizations       Discharge Orders      General info for SNF    Length of Stay Estimate: Short Term Care: Estimated # of Days 31-90  Condition at Discharge: Stable  Level of care:skilled   Rehabilitation Potential: Fair  Admission H&P remains valid and up-to-date: Yes  Recent Chemotherapy: N/A  Use Nursing Home Standing Orders: Yes     Mantoux instructions    Give two-step Mantoux (PPD) Per Facility Policy Yes     Reason for your hospital stay    Diarrhea, Norovirus     Follow Up and recommended labs and tests    [facility].  The following labs/tests are recommended: INR daily until stable. HOLD COUMADIN UNTIL INR <3.     Activity - Up with nursing assistance     Additional Discharge Instructions    HOLD COUMADIN UNTIL INR <3. CHECK DAILY INR     Physical Therapy Adult Consult    Evaluate and treat as clinically indicated.    Reason:  Weakness, Norovirus     Occupational Therapy Adult  Consult    Evaluate and treat as clinically indicated.    Reason:  Weakness, norovirus     Contact Isolation    Norovirus     Fall precautions     Advance Diet as Tolerated    Follow this diet upon discharge: Orders Placed This Encounter      Advance Diet as Tolerated: to regular diet       Significant Results and Procedures   Most Recent 3 CBC's:  Recent Labs   Lab Test 03/06/19  0610 03/05/19  1622 03/05/19  1151 03/05/19  1150   WBC 4.6 6.2  --  6.7   HGB 11.6* 11.9 13.3 12.7   MCV 96 94  --  93   * 100*  --  114*     Most Recent 3 BMP's:  Recent Labs   Lab Test 03/06/19  0610 03/05/19  1622 03/05/19  1151 03/05/19  1150    135 135 134   POTASSIUM 3.9 4.0 3.9 3.9   CHLORIDE 105 103 98 101   CO2 27 24  --  24   BUN 19 25 26 26   CR 0.97 0.96  --  0.96   ANIONGAP 4 8 13 9   NADIA 8.5 8.6  --  9.0   GLC 83 99 120* 116*     Most Recent Urinalysis:  Recent Labs   Lab Test 03/05/19  1153   COLOR Yellow   APPEARANCE Clear   URINEGLC Negative   URINEBILI Negative   URINEKETONE Negative   SG 1.023   UBLD Negative   URINEPH 5.0   PROTEIN Negative   NITRITE Negative   LEUKEST Negative   RBCU 5*   WBCU 1   ,   Results for orders placed or performed during the hospital encounter of 03/05/19   Abd/pelvis CT,  IV  contrast only TRAUMA / AAA    Narrative    CT ABDOMEN AND PELVIS WITH CONTRAST   3/5/2019 1:05 PM     HISTORY: Abd pain, unspecified    TECHNIQUE:   CT of the abdomen and pelvis was performed following the  administration of 74mL Isovue-370. Radiation dose for this scan was  reduced using automated exposure control, adjustment of the mA and/or  kV according to patient size, or iterative reconstruction technique.    COMPARISON: CT of the abdomen and pelvis dated 8/13/2008.    FINDINGS: The solid organs in the abdomen appear unremarkable. There  are scattered fluid-filled loops of colon and small bowel. There are  mildly prominent loops of small bowel but no definite evidence for  bowel obstruction. No free  fluid or free air in the abdomen or pelvis.    The lower aspects of the thorax are included on this exam. There is  pectus excavatum. There is a tiny left pleural effusion with adjacent  atelectasis. There is elevation of the right hemidiaphragm.      Impression    IMPRESSION:   1. Scattered loops of fluid-filled small and large bowel with mild  prominence of some small bowel loops. No definite bowel obstruction.  Findings could be associated with nonspecific enteritis.  2. Tiny left pleural effusion with adjacent atelectasis.    LINDA HERNANDES MD   XR Chest 2 Views    Narrative    XR CHEST 2 VW 3/5/2019 1:52 PM    HISTORY: sob      Impression    IMPRESSION: Mild pleural thickening or pleural effusion left lung  base. Chronic elevation right hemidiaphragm. Mild fibrotic changes in  both lungs. No change compared to 3/2/2019.    SEBAS ZAMORANO MD       Discharge Medications   Current Discharge Medication List      CONTINUE these medications which have NOT CHANGED    Details   ACETAMINOPHEN PO Take 1,000 mg by mouth every 6 hours as needed for pain      Bisacodyl (DULCOLAX PO) Take by mouth daily as needed      cholecalciferol (VITAMIN D3) 5000 units TABS tablet Take 5,000 Units by mouth daily      METOPROLOL TARTRATE PO Take 25 mg by mouth 2 times daily       Propylene Glycol-Glycerin (SOOTHE OP) Apply 1 drop to eye 2 times daily      !! warfarin (COUMADIN) 5 MG tablet Take  5 mg on Tues, Thurs, Sat, and Sun      !! warfarin (COUMADIN) 5 MG tablet Take 2.5 mg by mouth Take 2.5 mg on Mond, Wed and Fri       !! - Potential duplicate medications found. Please discuss with provider.        Allergies   Allergies   Allergen Reactions     Thorazine [Chlorpromazine Hcl]

## 2019-03-06 NOTE — PLAN OF CARE
PRIMARY DIAGNOSIS: GASTROENTERITIS    OUTPATIENT/OBSERVATION GOALS TO BE MET BEFORE DISCHARGE  1. Orthostatic performed: Yes:          Lying Orthostatic BP: 132/85         Sitting Orthostatic BP: 133/60         Standing Orthostatic BP: 123/61     2. Tolerating PO fluid and/or antibiotics (if applicable): Yes    3. Nausea/Vomiting/Diarrhea symptoms improved: Yes    4. Pain status: Pain free.    5. Return to near baseline physical activity: Yes    Discharge Planner Nurse   Safe discharge environment identified: No  Barriers to discharge: Yes       Entered by: Jia Kasper 03/06/2019 3:30 AM     Please review provider order for any additional goals.   Nurse to notify provider when observation goals have been met and patient is ready for discharge.

## 2019-03-06 NOTE — PLAN OF CARE
PRIMARY DIAGNOSIS: GASTROENTERITIS / GENERALIZED WEAKNESS    OUTPATIENT/OBSERVATION GOALS TO BE MET BEFORE DISCHARGE  1. Orthostatic performed: Yes:          Lying Orthostatic BP: 132/85         Sitting Orthostatic BP: 133/60         Standing Orthostatic BP: 123/61     2. Tolerating PO fluid and/or antibiotics (if applicable): Tolerating fulls    3. Nausea/Vomiting/Diarrhea symptoms improved: No,  a few loose stools, frequent, watery and green    4. Pain status: Pain free.    5. Return to near baseline physical activity: Yes    Discharge Planner Nurse   Safe discharge environment identified: No  Barriers to discharge: Yes       Entered by: Tamiko Mott 03/05/2019      VSS. Denies pain. Patient has had 3 watery stools this shift; stool urgency noted. LR running @ 75. Fluids encouraged. Tolerated clear liquids; advanced to fulls. Tele = NSR HR 81 per tele tech. Patient reports feeling weaker than usual; SBA for safety. AOx4, calling appropriately. Enteric precautions maintained.     Please review provider order for any additional goals.   Nurse to notify provider when observation goals have been met and patient is ready for discharge.

## 2019-03-06 NOTE — PROGRESS NOTES
Your information has been submitted on March 06th, 2019 at 11:25:42 AM CST. The confirmation number is [id number]      Discharge Planner   Discharge Plans in progress: [facility]  Barriers to discharge plan: None anticipated  Follow up plan: Pt has been accepted at [facility]. TCU stay requiring $5000 upfront, with a $38 daily private room fee due to pt having norovirus. Pt aware of and agreeable to private cost for TCU. Friend currently at bedside and can transport pt once discharge orders are in. Updated [facility] on discharge plans.        Entered by: Chelsi Teague 03/06/2019 11:26 AM

## 2019-03-06 NOTE — PHARMACY-ANTICOAGULATION SERVICE
Clinical Pharmacy - Warfarin Dosing Consult     Pharmacy has been consulted to manage this patient s warfarin therapy.  Indication: Atrial Fibrillation  Therapy Goal: INR 2-3  Warfarin Prior to Admission: Yes  Warfarin PTA Regimen: warfarin PTA - 2.5mg MWF, 5mg TTSS per med history     INR   Date Value Ref Range Status   03/05/2019 3.54 (H) 0.86 - 1.14 Final   03/03/2019 2.65 (H) 0.86 - 1.14 Final       Recommend warfarin 1mg today due to supratherapeutic INR.  Pharmacy will monitor Tameka Andersen daily and order warfarin doses to achieve specified goal.      Please contact pharmacy as soon as possible if the warfarin needs to be held for a procedure or if the warfarin goals change.

## 2019-03-06 NOTE — PLAN OF CARE
Patient's After Visit Summary was reviewed with patient and friend.  Patient verbalized understanding of After Visit Summary, recommended follow up and was given an opportunity to ask questions.   Discharge medications sent home with patient/family: N/A  Discharged with: friend    OBSERVATION patient END time: 1237

## 2019-03-07 ENCOUNTER — NURSING HOME VISIT (OUTPATIENT)
Dept: GERIATRICS | Facility: CLINIC | Age: 84
End: 2019-03-07
Payer: MEDICARE

## 2019-03-07 VITALS
WEIGHT: 139 LBS | BODY MASS INDEX: 28.02 KG/M2 | OXYGEN SATURATION: 94 % | HEIGHT: 59 IN | RESPIRATION RATE: 18 BRPM | DIASTOLIC BLOOD PRESSURE: 85 MMHG | HEART RATE: 90 BPM | TEMPERATURE: 97.8 F | SYSTOLIC BLOOD PRESSURE: 127 MMHG

## 2019-03-07 DIAGNOSIS — S22.000D CLOSED COMPRESSION FRACTURE OF THORACIC VERTEBRA WITH ROUTINE HEALING, SUBSEQUENT ENCOUNTER: ICD-10-CM

## 2019-03-07 DIAGNOSIS — R06.02 SHORTNESS OF BREATH: ICD-10-CM

## 2019-03-07 DIAGNOSIS — I48.20 CHRONIC ATRIAL FIBRILLATION (H): ICD-10-CM

## 2019-03-07 DIAGNOSIS — R53.81 PHYSICAL DECONDITIONING: ICD-10-CM

## 2019-03-07 DIAGNOSIS — Z85.118 HISTORY OF LUNG CANCER: ICD-10-CM

## 2019-03-07 DIAGNOSIS — R00.2 PALPITATIONS: ICD-10-CM

## 2019-03-07 DIAGNOSIS — R53.1 WEAKNESS: ICD-10-CM

## 2019-03-07 DIAGNOSIS — A09 DIARRHEA OF INFECTIOUS ORIGIN: ICD-10-CM

## 2019-03-07 DIAGNOSIS — R42 LIGHT-HEADEDNESS: ICD-10-CM

## 2019-03-07 DIAGNOSIS — R11.2 NON-INTRACTABLE VOMITING WITH NAUSEA, UNSPECIFIED VOMITING TYPE: Primary | ICD-10-CM

## 2019-03-07 PROCEDURE — 99310 SBSQ NF CARE HIGH MDM 45: CPT | Performed by: NURSE PRACTITIONER

## 2019-03-07 RX ORDER — CALCIUM CARBONATE 500 MG/1
1 TABLET, CHEWABLE ORAL 4 TIMES DAILY PRN
COMMUNITY
End: 2019-03-14

## 2019-03-07 ASSESSMENT — MIFFLIN-ST. JEOR: SCORE: 931.13

## 2019-03-07 NOTE — PROGRESS NOTES
[facility]  PRIMARY CARE PROVIDER AND CLINIC:  Markie Becker MD, [facility] [address] [facility]*  Chief Complaint   Patient presents with     Hospital F/U     [facility] Record Number:  [id number]  Place of Service where encounter took place:  [facility]  ([facility]) [[id number]]    Tameka Andersen  is a 89 year old  (1/7/1924), admitted to the above facility from  [facility]. Hospital stay 3/5/2019 through 3/6/2019..  Admitted to this facility for  rehab, medical management and nursing care.  HPI:    HPI information obtained from: facility chart records, facility staff, patient report and [facility] chart review.   Brief Summary of Hospital Course:   89 year old female with hx a fib (on Coumadin), lung cancer (s/p wedge resection w/o further treatment), and thoracic compression fx (1/2019) admitted with diarrhea, SOB, palpitations, lightheadedness, and fatigue and diagnosed with norovirus. HR managed with metoprolol, coumadin on hold due to INR elevated. Discharged to TCU for therapies, may need higher level of care at completion of TCU stay.     Updates on Status Since SNF Admission:   Since admission reports persistent loose stools but no cramping or nausea/emesis. Isolation/contact precautions while symptomatic. Up with assistance. No pain, dyspnea, chest discomfort at this time. Urinating without issues. Reports lives alone, up independently. SBP ranges 109-127, HR 71-90 since admission. Sats 94% on room air.      CODE STATUS/ADVANCE DIRECTIVES DISCUSSION:   DNR / DNI  Patient's living condition: lives alone  ALLERGIES: Thorazine [chlorpromazine hcl]  PAST MEDICAL HISTORY:  has a past medical history of A-fib (H), Anemia, Arthritis, and Cancer (H). She also has no past medical history of Chronic infection, History of blood transfusion, Malignant hyperthermia, or Sleep apnea.  PAST SURGICAL HISTORY:   has a past surgical history  "that includes GYN surgery; Cholecystectomy; ENT surgery; Lung surgery; and Arthroplasty elbow (Left, 6/12/2018).  FAMILY HISTORY: family history includes Other - See Comments in her father; Thrombosis in her mother.  SOCIAL HISTORY:   reports that  has never smoked. she has never used smokeless tobacco. She reports that she drinks alcohol. She reports that she does not use drugs.    Post Discharge Medication Reconciliation Status: discharge medications reconciled and changed, per note/orders (see AVS)    Current Outpatient Medications   Medication Sig Dispense Refill     ACETAMINOPHEN PO Take 1,000 mg by mouth every 6 hours as needed for pain       calcium carbonate (TUMS) 500 MG chewable tablet Take 1 chew tab by mouth 4 times daily as needed for heartburn       cholecalciferol (VITAMIN D3) 5000 units TABS tablet Take 5,000 Units by mouth daily       METOPROLOL TARTRATE PO Take 25 mg by mouth 2 times daily        WARFARIN SODIUM PO PER INR ORDERS       Propylene Glycol-Glycerin (SOOTHE OP) Apply 1 drop to eye 2 times daily       warfarin (COUMADIN) 5 MG tablet Take  5 mg on Tues, Thurs, Sat, and Sun       warfarin (COUMADIN) 5 MG tablet Take 2.5 mg by mouth Take 2.5 mg on Mond, Wed and Fri       ROS:  10 point ROS of systems including Constitutional, Eyes, Respiratory, Cardiovascular, Gastroenterology, Genitourinary, Integumentary, Musculoskeletal, Psychiatric were all negative except for pertinent positives noted in my HPI.    Vitals:  /85   Pulse 90   Temp 97.8  F (36.6  C)   Resp 18   Ht 1.499 m (4' 11\")   Wt 63 kg (139 lb)   SpO2 94%   BMI 28.07 kg/m    Exam:  GENERAL APPEARANCE:  Alert, in no distress, pleasant, cooperative, oriented x 4  EYES:  EOM, lids, pupils and irises normal, sclera clear and conjunctiva normal, no discharge or mattering on lids or lashes noted  ENT:  Mouth normal, moist mucous membranes, nose normal without drainage or crusting, external ears without lesions, hearing acuity " intact  RESP:  respiratory effort normal, no chest wall tenderness, no respiratory distress, Lung sounds clear, patient is on room air  CV:  Auscultation of heart done, rate and rhythm controlled and irregularly irregular, no murmur, no rub or gallop. Edema none bilateral lower extremities.   ABDOMEN:  normal bowel sounds, soft, nontender, no palpable masses.  M/S:   Gait and station walks with assist , no tenderness or swelling of the joints; able to move all extremities, digits normal  SKIN:  Inspection and palpation of skin and subcutaneous tissue: skin on extremities warm, dry and intact without rashes  NEURO: cranial nerves 2-12 grossly intact, no facial asymmetry, no speech deficits and able to follow directions, moves all extremities symmetrically  PSYCH:  insight and judgement and memory appears intact, affect and mood normal     Lab/Diagnostic data:  Most Recent 3 CBC's:  Recent Labs   Lab Test 03/06/19  0610 03/05/19  1622 03/05/19  1151 03/05/19  1150   WBC 4.6 6.2  --  6.7   HGB 11.6* 11.9 13.3 12.7   MCV 96 94  --  93   * 100*  --  114*     Most Recent 3 BMP's:  Recent Labs   Lab Test 03/06/19  0610 03/05/19  1622 03/05/19  1151 03/05/19  1150    135 135 134   POTASSIUM 3.9 4.0 3.9 3.9   CHLORIDE 105 103 98 101   CO2 27 24  --  24   BUN 19 25 26 26   CR 0.97 0.96  --  0.96   ANIONGAP 4 8 13 9   NADIA 8.5 8.6  --  9.0   GLC 83 99 120* 116*     Most Recent 2 LFT's:  Recent Labs   Lab Test 03/05/19  1150   AST 21   ALT 19   ALKPHOS 75   BILITOTAL 0.6       ASSESSMENT/PLAN:  Current [place] scheduled appointments:   No future appointments.     Non-intractable vomiting with nausea, unspecified vomiting type  Diarrhea of infectious origin  Acute onset - reports symptoms improving; no fevers. Encourage hydration, monitor for resolution.     Shortness of breath  Light-headedness  Palpitations  Weakness  History of lung cancer  Physical deconditioning  Acute on chronic issues. Weakness - therapies,  f/u with status next week. Encourage hydration. Check CBC and BMP next week and f/u with results. Monitor vs, wt, sats and f/u as needed.     Chronic atrial fibrillation (H)  Chronic, INR elevated. Will hold Coumadin x 2 days, then recheck INR on 3/9.     Closed compression fracture of thoracic vertebra with routine healing, subsequent encounter  Ongoing issue, no symptoms. Continue PRN tylenol, scheduled vit d.      Orders:  1. DNR/DNI  2. INR 3.8 today. Hold coumadin x 2 days, INR check on 3/9  3. discontinue Dulcolax   4. Soothe hydration 1.25% ophthalmic solution 1 gtt to each eye BID diagnosis dry eyes  5. CBC, BMP on 3/12 diagnosis weakness, diarrhea    Total time spent with patient visit at the skilled nursing facility was 35 min including patient visit and review of past records. Greater than 50% of total time spent with counseling and coordinating care due to review of history, current status and concerns and POC to address issues as noted above     Electronically signed by:  RADHA Anne CNP

## 2019-03-07 NOTE — LETTER
3/7/2019        RE: Tameka Andersen  [address] [place] [address]        [facility]  PRIMARY CARE PROVIDER AND CLINIC:  Markie Becker MD, [address] / [facility]*  Chief Complaint   Patient presents with     Hospital F/U     [facility] Medical Record Number:  [id number]  Place of Service where encounter took place:  [facility] [place]  (S) [[id number]]    Tameka Andersen  is a 89 year old  (1/7/1924), admitted to the above facility from  [facility]. Hospital stay 3/5/2019 through 3/6/2019..  Admitted to this facility for  rehab, medical management and nursing care.  HPI:    HPI information obtained from: facility chart records, facility staff, patient report and [facility] chart review.   Brief Summary of Hospital Course:   89 year old female with hx a fib (on Coumadin), lung cancer (s/p wedge resection w/o further treatment), and thoracic compression fx (1/2019) admitted with diarrhea, SOB, palpitations, lightheadedness, and fatigue and diagnosed with norovirus. HR managed with metoprolol, coumadin on hold due to INR elevated. Discharged to TCU for therapies, may need higher level of care at completion of TCU stay.     Updates on Status Since SNF Admission:   Since admission reports persistent loose stools but no cramping or nausea/emesis. Isolation/contact precautions while symptomatic. Up with assistance. No pain, dyspnea, chest discomfort at this time. Urinating without issues. Reports lives alone, up independently. SBP ranges 109-127, HR 71-90 since admission. Sats 94% on room air.      CODE STATUS/ADVANCE DIRECTIVES DISCUSSION:   DNR / DNI  Patient's living condition: lives alone  ALLERGIES: Thorazine [chlorpromazine hcl]  PAST MEDICAL HISTORY:  has a past medical history of A-fib (H), Anemia, Arthritis, and Cancer (H). She also has no past medical history of Chronic infection, History of blood transfusion,  "Malignant hyperthermia, or Sleep apnea.  PAST SURGICAL HISTORY:   has a past surgical history that includes GYN surgery; Cholecystectomy; ENT surgery; Lung surgery; and Arthroplasty elbow (Left, 6/12/2018).  FAMILY HISTORY: family history includes Other - See Comments in her father; Thrombosis in her mother.  SOCIAL HISTORY:   reports that  has never smoked. she has never used smokeless tobacco. She reports that she drinks alcohol. She reports that she does not use drugs.    Post Discharge Medication Reconciliation Status: discharge medications reconciled and changed, per note/orders (see AVS)    Current Outpatient Medications   Medication Sig Dispense Refill     ACETAMINOPHEN PO Take 1,000 mg by mouth every 6 hours as needed for pain       calcium carbonate (TUMS) 500 MG chewable tablet Take 1 chew tab by mouth 4 times daily as needed for heartburn       cholecalciferol (VITAMIN D3) 5000 units TABS tablet Take 5,000 Units by mouth daily       METOPROLOL TARTRATE PO Take 25 mg by mouth 2 times daily        WARFARIN SODIUM PO PER INR ORDERS       Propylene Glycol-Glycerin (SOOTHE OP) Apply 1 drop to eye 2 times daily       warfarin (COUMADIN) 5 MG tablet Take  5 mg on Tues, Thurs, Sat, and Sun       warfarin (COUMADIN) 5 MG tablet Take 2.5 mg by mouth Take 2.5 mg on Mond, Wed and Fri       ROS:  10 point ROS of systems including Constitutional, Eyes, Respiratory, Cardiovascular, Gastroenterology, Genitourinary, Integumentary, Musculoskeletal, Psychiatric were all negative except for pertinent positives noted in my HPI.    Vitals:  /85   Pulse 90   Temp 97.8  F (36.6  C)   Resp 18   Ht 1.499 m (4' 11\")   Wt 63 kg (139 lb)   SpO2 94%   BMI 28.07 kg/m     Exam:  GENERAL APPEARANCE:  Alert, in no distress, pleasant, cooperative, oriented x 4  EYES:  EOM, lids, pupils and irises normal, sclera clear and conjunctiva normal, no discharge or mattering on lids or lashes noted  ENT:  Mouth normal, moist mucous " membranes, nose normal without drainage or crusting, external ears without lesions, hearing acuity intact  RESP:  respiratory effort normal, no chest wall tenderness, no respiratory distress, Lung sounds clear, patient is on room air  CV:  Auscultation of heart done, rate and rhythm controlled and irregularly irregular, no murmur, no rub or gallop. Edema none bilateral lower extremities.   ABDOMEN:  normal bowel sounds, soft, nontender, no palpable masses.  M/S:   Gait and station walks with assist , no tenderness or swelling of the joints; able to move all extremities, digits normal  SKIN:  Inspection and palpation of skin and subcutaneous tissue: skin on extremities warm, dry and intact without rashes  NEURO: cranial nerves 2-12 grossly intact, no facial asymmetry, no speech deficits and able to follow directions, moves all extremities symmetrically  PSYCH:  insight and judgement and memory appears intact, affect and mood normal     Lab/Diagnostic data:  Most Recent 3 CBC's:  Recent Labs   Lab Test 03/06/19  0610 03/05/19  1622 03/05/19  1151 03/05/19  1150   WBC 4.6 6.2  --  6.7   HGB 11.6* 11.9 13.3 12.7   MCV 96 94  --  93   * 100*  --  114*     Most Recent 3 BMP's:  Recent Labs   Lab Test 03/06/19  0610 03/05/19  1622 03/05/19  1151 03/05/19  1150    135 135 134   POTASSIUM 3.9 4.0 3.9 3.9   CHLORIDE 105 103 98 101   CO2 27 24  --  24   BUN 19 25 26 26   CR 0.97 0.96  --  0.96   ANIONGAP 4 8 13 9   NADIA 8.5 8.6  --  9.0   GLC 83 99 120* 116*     Most Recent 2 LFT's:  Recent Labs   Lab Test 03/05/19  1150   AST 21   ALT 19   ALKPHOS 75   BILITOTAL 0.6       ASSESSMENT/PLAN:  Current [facility] scheduled appointments:   No future appointments.     Non-intractable vomiting with nausea, unspecified vomiting type  Diarrhea of infectious origin  Acute onset - reports symptoms improving; no fevers. Encourage hydration, monitor for resolution.     Shortness of  breath  Light-headedness  Palpitations  Weakness  History of lung cancer  Physical deconditioning  Acute on chronic issues. Weakness - therapies, f/u with status next week. Encourage hydration. Check CBC and BMP next week and f/u with results. Monitor vs, wt, sats and f/u as needed.     Chronic atrial fibrillation (H)  Chronic, INR elevated. Will hold Coumadin x 2 days, then recheck INR on 3/9.     Closed compression fracture of thoracic vertebra with routine healing, subsequent encounter  Ongoing issue, no symptoms. Continue PRN tylenol, scheduled vit d.      Orders:  1. DNR/DNI  2. INR 3.8 today. Hold coumadin x 2 days, INR check on 3/9  3. discontinue Dulcolax   4. Soothe hydration 1.25% ophthalmic solution 1 gtt to each eye BID diagnosis dry eyes  5. CBC, BMP on 3/12 diagnosis weakness, diarrhea    Total time spent with patient visit at the skilled nursing facility was 35 min including patient visit and review of past records. Greater than 50% of total time spent with counseling and coordinating care due to review of history, current status and concerns and POC to address issues as noted above     Electronically signed by:  RADHA Anne CNP                       Sincerely,        RADHA Anne CNP

## 2019-03-08 ENCOUNTER — NURSING HOME VISIT (OUTPATIENT)
Dept: GERIATRICS | Facility: CLINIC | Age: 84
End: 2019-03-08
Payer: MEDICARE

## 2019-03-08 VITALS
DIASTOLIC BLOOD PRESSURE: 75 MMHG | WEIGHT: 139 LBS | SYSTOLIC BLOOD PRESSURE: 129 MMHG | HEIGHT: 59 IN | RESPIRATION RATE: 18 BRPM | HEART RATE: 88 BPM | BODY MASS INDEX: 28.02 KG/M2 | OXYGEN SATURATION: 93 % | TEMPERATURE: 97.4 F

## 2019-03-08 DIAGNOSIS — Z79.01 LONG TERM (CURRENT) USE OF ANTICOAGULANTS: ICD-10-CM

## 2019-03-08 DIAGNOSIS — R79.1 SUPRATHERAPEUTIC INR: ICD-10-CM

## 2019-03-08 DIAGNOSIS — Z51.81 ENCOUNTER FOR THERAPEUTIC DRUG MONITORING: ICD-10-CM

## 2019-03-08 DIAGNOSIS — I48.20 CHRONIC ATRIAL FIBRILLATION (H): Primary | ICD-10-CM

## 2019-03-08 PROCEDURE — 99308 SBSQ NF CARE LOW MDM 20: CPT | Performed by: NURSE PRACTITIONER

## 2019-03-08 ASSESSMENT — MIFFLIN-ST. JEOR: SCORE: 931.13

## 2019-03-08 NOTE — PROGRESS NOTES
"[facility]  [facility] Medical Record Number:  [id number]  Place of Service where encounter took place: [facility]  (S) [[id number]]    HPI:    Tameka Andersen is a 89 year old  (1/7/1924), who is being seen today for an episodic care visit at the above location.   HPI information obtained from: facility chart records, facility staff, patient report and [facility] chart review. Today's concern is INR/Coumadin management for A. Fib    ROS/Subjective:  Bleeding Signs/Symptoms:  None  Thromboembolic Signs/Symptoms:  None  Medication Changes:  Yes: coumadin held for the past three days  Dietary Changes:  Yes: now at TCU  Activity Changes: Yes: illness, now at TCU  Bacterial/Viral Infection:  Yes: recovering from norovirus  Missed Coumadin Doses:  This week - three doses held  On ASA: No  Other Concerns:  No    OBJECTIVE:  /75   Pulse 88   Temp 97.4  F (36.3  C)   Resp 18   Ht 1.499 m (4' 11\")   Wt 63 kg (139 lb)   SpO2 93%   BMI 28.07 kg/m     Awake female pleasant and oriented x 4. Up in chair, on room air, no distress. No LE edema.   Last INR: 3.8 on 3/7  INR Today:  3.3  Current Dose:  Coumadin on hold x 3 days, has taken total of 16 mg in the past seven days.     ASSESSMENT:  1. Chronic atrial fibrillation (H)    2. Long term (current) use of anticoagulants    3. Encounter for therapeutic drug monitoring    4. Supratherapeutic INR      Supratherapeutic INR for goal of 2-3    PLAN:  New Dose: hold coumadin today    Next INR: 3/9  On 3/9 if INR is in range of 2-3 will start Coumadin 2 mg PO daily, repeat INR on 3/12.   On 3/9 if INR is below 2 or above 3 - staff to call provider with results.     Electronically signed by:  RADHA Anne CNP   "

## 2019-03-08 NOTE — LETTER
"    3/8/2019        RE: Tameka Andersen  [address] [place] [address] [place] [address]        [facility]  [facility] Medical Record Number:  [id number]  Place of Service where encounter took place: [facility]  (S) [[id number]]    HPI:    Tameka Andersen is a 89 year old  (1/7/1924), who is being seen today for an episodic care visit at the above location.   HPI information obtained from: facility chart records, facility staff, patient report and [facility] chart review. Today's concern is INR/Coumadin management for A. Fib    ROS/Subjective:  Bleeding Signs/Symptoms:  None  Thromboembolic Signs/Symptoms:  None  Medication Changes:  Yes: coumadin held for the past three days  Dietary Changes:  Yes: now at TCU  Activity Changes: Yes: illness, now at TCU  Bacterial/Viral Infection:  Yes: recovering from norovirus  Missed Coumadin Doses:  This week - three doses held  On ASA: No  Other Concerns:  No    OBJECTIVE:  /75   Pulse 88   Temp 97.4  F (36.3  C)   Resp 18   Ht 1.499 m (4' 11\")   Wt 63 kg (139 lb)   SpO2 93%   BMI 28.07 kg/m      Awake female pleasant and oriented x 4. Up in chair, on room air, no distress. No LE edema.   Last INR: 3.8 on 3/7  INR Today:  3.3  Current Dose:  Coumadin on hold x 3 days, has taken total of 16 mg in the past seven days.     ASSESSMENT:  1. Chronic atrial fibrillation (H)    2. Long term (current) use of anticoagulants    3. Encounter for therapeutic drug monitoring    4. Supratherapeutic INR      Supratherapeutic INR for goal of 2-3    PLAN:  New Dose: hold coumadin today    Next INR: 3/9  On 3/9 if INR is in range of 2-3 will start Coumadin 2 mg PO daily, repeat INR on 3/12.   On 3/9 if INR is below 2 or above 3 - staff to call provider with results.     Electronically signed by:  RADHA Anne CNP       Sincerely,        RADHA Anne CNP    "

## 2019-03-12 ENCOUNTER — NURSING HOME VISIT (OUTPATIENT)
Dept: GERIATRICS | Facility: CLINIC | Age: 84
End: 2019-03-12
Payer: MEDICARE

## 2019-03-12 ENCOUNTER — HOSPITAL LABORATORY (OUTPATIENT)
Dept: OTHER | Facility: CLINIC | Age: 84
End: 2019-03-12

## 2019-03-12 VITALS
BODY MASS INDEX: 27.62 KG/M2 | SYSTOLIC BLOOD PRESSURE: 133 MMHG | TEMPERATURE: 97.4 F | RESPIRATION RATE: 16 BRPM | HEART RATE: 94 BPM | HEIGHT: 59 IN | OXYGEN SATURATION: 97 % | WEIGHT: 137 LBS | DIASTOLIC BLOOD PRESSURE: 77 MMHG

## 2019-03-12 DIAGNOSIS — Z51.81 ENCOUNTER FOR THERAPEUTIC DRUG MONITORING: ICD-10-CM

## 2019-03-12 DIAGNOSIS — I48.20 CHRONIC ATRIAL FIBRILLATION (H): Primary | ICD-10-CM

## 2019-03-12 DIAGNOSIS — Z79.01 LONG TERM (CURRENT) USE OF ANTICOAGULANTS: ICD-10-CM

## 2019-03-12 DIAGNOSIS — R79.1 SUPRATHERAPEUTIC INR: ICD-10-CM

## 2019-03-12 LAB
ANION GAP SERPL CALCULATED.3IONS-SCNC: 6 MMOL/L (ref 3–14)
BUN SERPL-MCNC: 25 MG/DL (ref 7–30)
CALCIUM SERPL-MCNC: 9.4 MG/DL (ref 8.5–10.1)
CHLORIDE SERPL-SCNC: 103 MMOL/L (ref 94–109)
CO2 SERPL-SCNC: 27 MMOL/L (ref 20–32)
CREAT SERPL-MCNC: 0.84 MG/DL (ref 0.52–1.04)
ERYTHROCYTE [DISTWIDTH] IN BLOOD BY AUTOMATED COUNT: 17 % (ref 10–15)
GFR SERPL CREATININE-BSD FRML MDRD: 59 ML/MIN/{1.73_M2}
GLUCOSE SERPL-MCNC: 80 MG/DL (ref 70–99)
HCT VFR BLD AUTO: 40.1 % (ref 35–47)
HGB BLD-MCNC: 13 G/DL (ref 11.7–15.7)
MCH RBC QN AUTO: 29.9 PG (ref 26.5–33)
MCHC RBC AUTO-ENTMCNC: 32.4 G/DL (ref 31.5–36.5)
MCV RBC AUTO: 92 FL (ref 78–100)
PLATELET # BLD AUTO: 98 10E9/L (ref 150–450)
POTASSIUM SERPL-SCNC: 4.1 MMOL/L (ref 3.4–5.3)
RBC # BLD AUTO: 4.35 10E12/L (ref 3.8–5.2)
SODIUM SERPL-SCNC: 136 MMOL/L (ref 133–144)
WBC # BLD AUTO: 7.7 10E9/L (ref 4–11)

## 2019-03-12 PROCEDURE — 99308 SBSQ NF CARE LOW MDM 20: CPT | Performed by: NURSE PRACTITIONER

## 2019-03-12 ASSESSMENT — MIFFLIN-ST. JEOR: SCORE: 922.06

## 2019-03-12 NOTE — PROGRESS NOTES
"[facility]  [facility] Medical Record Number:  [id number]  Place of Service where encounter took place: [facility]  (S) [[id number]]    HPI:    Tameka Andersen is a 89 year old  (1/7/1924), who is being seen today for an episodic care visit at the above location.   HPI information obtained from: facility chart records, facility staff, patient report and [facility] chart review. Today's concern is INR/Coumadin management for A. Fib    ROS/Subjective:  Bleeding Signs/Symptoms:  None  Thromboembolic Signs/Symptoms:  None  Medication Changes:  Yes: adjusting Coumadin dose  Dietary Changes:  Yes: now at TCU  Activity Changes: Yes: now at TCU  Bacterial/Viral Infection:  No  Missed Coumadin Doses:  This week - held several days  On ASA: No  Other Concerns:  No    OBJECTIVE:  /77   Pulse 94   Temp 97.4  F (36.3  C)   Resp 16   Ht 1.499 m (4' 11\")   Wt 62.1 kg (137 lb)   SpO2 97%   BMI 27.67 kg/m    Last INR: 2.3 on 3/9  INR Today:  1.5  Current Dose:  Has taken total of 7 mg of Coumadin in the past week    ASSESSMENT:  1. Chronic atrial fibrillation (H)    2. Long term (current) use of anticoagulants    3. Encounter for therapeutic drug monitoring    4. Supratherapeutic INR      Subtherapeutic INR for goal of 2-3    PLAN:  New Dose: Coumadin 2 mg PO daily    Next INR: 3/14/19    Electronically signed by:  RADHA Anne CNP     "

## 2019-03-12 NOTE — PROGRESS NOTES
"[facility]  [facility] Medical Record Number:  [id number]  Place of Service where encounter took place: [facility]  (S) [[id number]]    HPI:    Tameka Andersen is a 89 year old  (1/7/1924), who is being seen today for an episodic care visit at the above location.   HPI information obtained from: facility chart records, facility staff, patient report and [facility] chart review. Today's concern is INR/Coumadin management for A. Fib    ROS/Subjective:  Bleeding Signs/Symptoms:  None  Thromboembolic Signs/Symptoms:  None  Medication Changes:  No  Dietary Changes:  No  Activity Changes: No  Bacterial/Viral Infection:  No  Missed Coumadin Doses:  None  On ASA: No  Other Concerns:  No    OBJECTIVE:  /77   Pulse 94   Temp 97.4  F (36.3  C)   Resp 16   Ht 1.499 m (4' 11\")   Wt 62.1 kg (137 lb)   SpO2 97%   BMI 27.67 kg/m    Last INR: 2.3 on 03.09.19  INR Today:  1.5  Current Dose:  2 mg po daily  INR Flow sheet at SNF:  In the last 7 days, pt received 7 mg of warfarin.     ASSESSMENT:  Chronic atrial fibrillation (H)  Long term (current) use of anticoagulants  Encounter for therapeutic drug monitoring  Supratherapeutic INR    Subtherapeutic INR for goal of 2-3    PLAN:  New Dose: 2 mg PO daily    Next INR: Thursday 03.14.19  Coumadin Dx: Afib    Electronically signed by:  Mariya Raymundo NP Student    This patient was seen along with a nurse practitioner student, Mariya Raymundo.  The histories and reviews of systems were obtained by student and confirmed by myself. All objective, assessments and plans were completed by myself.   Roseann Young, APRN CNP     "

## 2019-03-13 ENCOUNTER — NURSING HOME VISIT (OUTPATIENT)
Dept: GERIATRICS | Facility: CLINIC | Age: 84
End: 2019-03-13
Payer: MEDICARE

## 2019-03-13 VITALS
TEMPERATURE: 97.4 F | RESPIRATION RATE: 16 BRPM | OXYGEN SATURATION: 97 % | HEIGHT: 59 IN | WEIGHT: 137 LBS | BODY MASS INDEX: 27.62 KG/M2 | HEART RATE: 85 BPM | DIASTOLIC BLOOD PRESSURE: 79 MMHG | SYSTOLIC BLOOD PRESSURE: 121 MMHG

## 2019-03-13 DIAGNOSIS — I48.20 CHRONIC ATRIAL FIBRILLATION (H): ICD-10-CM

## 2019-03-13 DIAGNOSIS — N18.30 CKD (CHRONIC KIDNEY DISEASE) STAGE 3, GFR 30-59 ML/MIN (H): ICD-10-CM

## 2019-03-13 DIAGNOSIS — A09 DIARRHEA OF INFECTIOUS ORIGIN: Primary | ICD-10-CM

## 2019-03-13 DIAGNOSIS — R53.81 PHYSICAL DECONDITIONING: ICD-10-CM

## 2019-03-13 PROCEDURE — 99304 1ST NF CARE SF/LOW MDM 25: CPT | Performed by: INTERNAL MEDICINE

## 2019-03-13 PROCEDURE — 99207 ZZC CDG-HISTORY COMP: MEETS EXP. PROB FOCUSED- DOWN CODED LACK OF PFSH: CPT | Performed by: INTERNAL MEDICINE

## 2019-03-13 ASSESSMENT — MIFFLIN-ST. JEOR: SCORE: 922.06

## 2019-03-13 NOTE — LETTER
3/13/2019        RE: Tameka Andersen  [address] [place] [address] [place] [address]          PRIMARY CARE PROVIDER AND CLINIC RESPONSIBLE:  Markie Becker [facility] [id number] [place] [address] / [facility]*        ADMISSION HISTORY AND PHYSICAL EXAMINATION     Chief Complaint   Patient presents with     Hospital F/U         HISTORY OF PRESENT ILLNESS:  89 year old female, (1/7/1924), [facility] and [facility].    Pt admitted [facility] 3/5 to 3/6 for weakness due to diarrhea.     Pt denies any chest pain/worsening SOB/N/V/weakness/abdominal pain/diarrhea.    Please see Roseann Young's CNP admit noted dated 3/7 for details of admission, past medical history, family history, allergies, medication list, social history and other details pertinent with this admission. Hospital admission and dc summary reviewed.      Past Medical History:   Diagnosis Date     A-fib (H)      Anemia      Arthritis     osteoarthritis     Cancer (H)     lung       Past Surgical History:   Procedure Laterality Date     ARTHROPLASTY ELBOW Left 6/12/2018    Procedure: ARTHROPLASTY ELBOW;  1.  Left total elbow arthroplasty using the Jeanne Coonrad/Morrey total elbow with a size small cemented ulna and a small cemented humerus.   2.  Open reduction internal fixation olecranon fracture using a tension band technique.   3.  Subcutaneous transposition ulnar nerve, left elbow. ;  Surgeon: Laci Panchal MD;  Location: [facility] OR     CHOLECYSTECTOMY       ENT SURGERY      tonsillectomy     GYN SURGERY      hysterectomy     LUNG SURGERY         Current Outpatient Medications   Medication Sig     ACETAMINOPHEN PO Take 1,000 mg by mouth every 6 hours as needed for pain     cholecalciferol (VITAMIN D3) 5000 units TABS tablet Take 5,000 Units by mouth daily     METOPROLOL TARTRATE PO Take 25 mg by mouth 2 times daily      Propylene Glycol-Glycerin (SOOTHE OP) Place 1 drop into both eyes 2 times daily       WARFARIN SODIUM PO PER INR ORDERS     calcium carbonate (TUMS) 500 MG chewable tablet Take 1 chew tab by mouth 4 times daily as needed for heartburn     warfarin (COUMADIN) 5 MG tablet Take  5 mg on Tues, Thurs, Sat, and Sun     warfarin (COUMADIN) 5 MG tablet Take 2.5 mg by mouth Take 2.5 mg on Mond, Wed and Fri     No current facility-administered medications for this visit.        Allergies   Allergen Reactions     Thorazine [Chlorpromazine Hcl]        Social History     Socioeconomic History     Marital status:      Spouse name: Not on file     Number of children: 0     Years of education: Not on file     Highest education level: Not on file   Occupational History     Occupation: retired   Social Needs     Financial resource strain: Not on file     Food insecurity:     Worry: Not on file     Inability: Not on file     Transportation needs:     Medical: Not on file     Non-medical: Not on file   Tobacco Use     Smoking status: Never Smoker     Smokeless tobacco: Never Used   Substance and Sexual Activity     Alcohol use: Yes     Comment: twice a month     Drug use: No     Sexual activity: Not on file   Lifestyle     Physical activity:     Days per week: Not on file     Minutes per session: Not on file     Stress: Not on file   Relationships     Social connections:     Talks on phone: Not on file     Gets together: Not on file     Attends [employer] service: Not on file     Active member of club or organization: Not on file     Attends meetings of clubs or organizations: Not on file     Relationship status: Not on file     Intimate partner violence:     Fear of current or ex partner: Not on file     Emotionally abused: Not on file     Physically abused: Not on file     Forced sexual activity: Not on file   Other Topics Concern     Not on file   Social History Narrative    Lives alone.  No children.  Retired.          Information reviewed:  Medications, vital signs, orders, nursing notes, problem list,  "hospital information.     ROS: All 10 point review of system completed, those pertinent positive, please see H&P, the remaining ROS is negative.    /79   Pulse 85   Temp 97.4  F (36.3  C)   Resp 16   Ht 1.499 m (4' 11\")   Wt 62.1 kg (137 lb)   SpO2 97%   BMI 27.67 kg/m       PHYSICAL EXAMINATION:  GENERAL:  No acute distress. Frail and thin stature.  SKIN:  Dry and warm.  There is no rash, lesions, ulcers or juandice at area of skin examined.  HEENT:  Head without trauma.  Pupils round, reactive. Exam of conjunctiva and lids are normal. Sclera without icterus. There is no oral thrush.  NECK:  Supple.  There is no cervical adenopathy, no thyromegaly. No jugular venous distension.  CHEST: No reproducible chest tenderness.   LUNGS:  Normal respiratory effort. Lungs are Clear on ascultation.  HEART:  Regular rate and rhythm.  No murmur, gallops or rubs auscultated.  ABDOMEN:  Soft, bowel sounds positive.  There is no tenderness or guarding.   EXTREMITIES: No edema.   NEUROLOGIC:  Alert and oriented x3.      Lab/Diagnostic data:  Reviewed    Lab Results   Component Value Date    WBC 7.7 03/12/2019     Lab Results   Component Value Date    RBC 4.35 03/12/2019     Lab Results   Component Value Date    HGB 13.0 03/12/2019     Lab Results   Component Value Date    HCT 40.1 03/12/2019     Lab Results   Component Value Date    MCV 92 03/12/2019     Lab Results   Component Value Date    MCH 29.9 03/12/2019     Lab Results   Component Value Date    MCHC 32.4 03/12/2019     Lab Results   Component Value Date    RDW 17.0 03/12/2019     Lab Results   Component Value Date    PLT 98 03/12/2019       Last Comprehensive Metabolic Panel:  Sodium   Date Value Ref Range Status   03/12/2019 136 133 - 144 mmol/L Final     Potassium   Date Value Ref Range Status   03/12/2019 4.1 3.4 - 5.3 mmol/L Final     Chloride   Date Value Ref Range Status   03/12/2019 103 94 - 109 mmol/L Final     Carbon Dioxide   Date Value Ref Range " Status   03/12/2019 27 20 - 32 mmol/L Final     Anion Gap   Date Value Ref Range Status   03/12/2019 6 3 - 14 mmol/L Final     Glucose   Date Value Ref Range Status   03/12/2019 80 70 - 99 mg/dL Final     Urea Nitrogen   Date Value Ref Range Status   03/12/2019 25 7 - 30 mg/dL Final     Creatinine   Date Value Ref Range Status   03/12/2019 0.84 0.52 - 1.04 mg/dL Final     GFR Estimate   Date Value Ref Range Status   03/12/2019 59 (L) >60 mL/min/[1.73_m2] Final     Comment:     Non  GFR Calc  Starting 12/18/2018, serum creatinine based estimated GFR (eGFR) will be   calculated using the Chronic Kidney Disease Epidemiology Collaboration   (CKD-EPI) equation.       Calcium   Date Value Ref Range Status   03/12/2019 9.4 8.5 - 10.1 mg/dL Final       Lab Results   Component Value Date    INR 4.73 03/06/2019    INR 3.54 03/05/2019    INR 2.65 03/03/2019    INR 2.86 03/02/2019    INR 1.43 06/15/2018    INR 1.36 06/14/2018    INR 1.32 06/13/2018    INR 1.22 06/12/2018    INR 2.32 04/18/2017    INR 1.15 08/14/2008    INR 1.00 04/08/2006         ASSESSMENT / PLAN:     Diarrhea of infectious origin  - CT A/P showed enteritis.  - stool showed norovirus.  - Improved.    Chronic atrial fibrillation (H)  - On metoprolol for rate control.  - On coumadin for CVA ppx.  - INR goal 2-3.    CKD (chronic kidney disease) stage 3, GFR 30-59 ml/min (H)  - avoid nephrotoxins.    Physical deconditioning  -Plan: PT/OT, fall precautions. Care conference with patient and family for the progress of rehab and disposition issues will be discussed as planned. Rehab evaluation and other evaluations including CPT are at rehab logs, to be reviewed separately.  Fall risk assessment as well as cognitive evaluation will be formed during rehab stay if indicated.      Other problems with same care. Primary care doctor and other specialists to address those chronic problems in next clinic appointment to be scheduled upon discharge from the  TCU.    Total time spent with patient visit was 30 min including patient visit, review of past records, 1/2 time on patients counseling and coordinating care.        Ena Godfrey MD      Sincerely,        Ena Godfrey MD

## 2019-03-14 ENCOUNTER — DISCHARGE SUMMARY NURSING HOME (OUTPATIENT)
Dept: GERIATRICS | Facility: CLINIC | Age: 84
End: 2019-03-14
Payer: MEDICARE

## 2019-03-14 VITALS
OXYGEN SATURATION: 97 % | SYSTOLIC BLOOD PRESSURE: 123 MMHG | HEIGHT: 59 IN | DIASTOLIC BLOOD PRESSURE: 69 MMHG | BODY MASS INDEX: 27.62 KG/M2 | RESPIRATION RATE: 16 BRPM | HEART RATE: 82 BPM | WEIGHT: 137 LBS | TEMPERATURE: 97.6 F

## 2019-03-14 DIAGNOSIS — R06.02 SHORTNESS OF BREATH: ICD-10-CM

## 2019-03-14 DIAGNOSIS — A09 DIARRHEA OF INFECTIOUS ORIGIN: ICD-10-CM

## 2019-03-14 DIAGNOSIS — R11.2 NON-INTRACTABLE VOMITING WITH NAUSEA, UNSPECIFIED VOMITING TYPE: Primary | ICD-10-CM

## 2019-03-14 DIAGNOSIS — R42 LIGHT-HEADEDNESS: ICD-10-CM

## 2019-03-14 DIAGNOSIS — S22.000D CLOSED COMPRESSION FRACTURE OF THORACIC VERTEBRA WITH ROUTINE HEALING, SUBSEQUENT ENCOUNTER: ICD-10-CM

## 2019-03-14 DIAGNOSIS — I48.20 CHRONIC ATRIAL FIBRILLATION (H): ICD-10-CM

## 2019-03-14 DIAGNOSIS — R53.1 WEAKNESS: ICD-10-CM

## 2019-03-14 DIAGNOSIS — Z85.118 HISTORY OF LUNG CANCER: ICD-10-CM

## 2019-03-14 DIAGNOSIS — R00.2 PALPITATIONS: ICD-10-CM

## 2019-03-14 DIAGNOSIS — R53.81 PHYSICAL DECONDITIONING: ICD-10-CM

## 2019-03-14 PROCEDURE — 99316 NF DSCHRG MGMT 30 MIN+: CPT | Performed by: NURSE PRACTITIONER

## 2019-03-14 ASSESSMENT — MIFFLIN-ST. JEOR: SCORE: 922.06

## 2019-03-14 NOTE — PROGRESS NOTES
[facility] DISCHARGE SUMMARY  PATIENT'S NAME: Tameka Andersen  YOB: 1924  MEDICAL RECORD NUMBER:  [id number]  Place of Service where encounter took place:  [facility]  (S) [[id number]]    PRIMARY CARE PROVIDER AND CLINIC RESPONSIBLE AFTER TRANSFER:   Markie Becker MD, [facility] [address] [facility]   Non-[facility] Provider     Transferring providers: RADHA Anne CNP, Ena Godfrey MD  Recent Hospitalization/ED: [facility] stay 3/5/2019 through 3/6/2019.  Date of SNF Admission: March / 06 / 2019  Date of SNF (anticipated) Discharge: March / 16 / 2019  Discharged to: previous independent home  Cognitive Scores: CPT: 5.4/5.6 and SLUMS 16/30  Physical Function: Ambulating 100 ft with independently  DME: none    CODE STATUS/ADVANCE DIRECTIVES DISCUSSION:  DNR / DNI  ALLERGIES: Thorazine [chlorpromazine hcl]    Brief Summary of Hospital Course:   89 year old female with hx a fib (on Coumadin), lung cancer (s/p wedge resection w/o further treatment), and thoracic compression fx (1/2019) admitted with diarrhea, SOB, palpitations, lightheadedness, and fatigue and diagnosed with norovirus. HR managed with metoprolol, coumadin on hold due to INR elevated. Discharged to TCU for therapies, may need higher level of care at completion of TCU stay.     DISCHARGE DIAGNOSIS/NURSING FACILITY COURSE:   Non-intractable vomiting with nausea, unspecified vomiting type  Diarrhea of infectious origin  At TCU GI symptoms resolved. Does report one loose stool yesterday, none today. No n/v. No fevers.   Lab Results   Component Value Date    WBC 7.7 03/12/2019    WBC 4.6 03/06/2019       Shortness of breath  History of lung cancer  Improved, symptoms resolved. Sats 97% on room air. No cough.     Light-headedness  Palpitations  Weakness  Physical deconditioning  Symptoms improved, regained strength. Ready to discharge home with therapies PT,  "OT, RN, A and SW. SBP range 116-133. Down 2 lbs since admission.     Chronic atrial fibrillation (H)  Takes metoprolol. On Coumadin and INR today 1.5   On 3/13 INR 1.5 took 4 mg  On 3/12 INR 1.5 took 2 mg  On 3/9 INR 2.3 took 2 mg daily  On 3/8 INR 3.3 dose held  On 3/7 INR 3.8 dose held    Closed compression fracture of thoracic vertebra with routine healing, subsequent encounter  Denies back pain.     Past Medical History:  has a past medical history of A-fib (H), Anemia, Arthritis, and Cancer (H). She also has no past medical history of Chronic infection, History of blood transfusion, Malignant hyperthermia, or Sleep apnea.    Discharge Medications:  Current Outpatient Medications   Medication Sig Dispense Refill     ACETAMINOPHEN PO Take 1,000 mg by mouth every 6 hours as needed for pain       cholecalciferol (VITAMIN D3) 5000 units TABS tablet Take 5,000 Units by mouth daily       METOPROLOL TARTRATE PO Take 25 mg by mouth 2 times daily        Propylene Glycol-Glycerin (SOOTHE OP) Place 1 drop into both eyes 2 times daily        WARFARIN SODIUM PO PER INR ORDERS       Medication Changes/Rationale:     Adjusting coumadin dose as above    Controlled medications sent with patient:   not applicable/none     ROS:   10 point ROS of systems including Constitutional, Eyes, Respiratory, Cardiovascular, Gastroenterology, Genitourinary, Integumentary, Musculoskeletal, Psychiatric were all negative except for pertinent positives noted in my HPI.    Physical Exam:   Vitals: /69   Pulse 82   Temp 97.6  F (36.4  C)   Resp 16   Ht 1.499 m (4' 11\")   Wt 62.1 kg (137 lb)   SpO2 97%   BMI 27.67 kg/m    BMI= Body mass index is 27.67 kg/m .  GENERAL APPEARANCE:  Alert, in no distress, pleasant, cooperative, oriented x 4  EYES:  EOM, lids, pupils and irises normal, sclera clear and conjunctiva normal, no discharge or mattering on lids or lashes noted  ENT:  Mouth normal, moist mucous membranes, nose normal without " drainage or crusting, external ears without lesions, hearing acuity intact  RESP:  respiratory effort normal, no chest wall tenderness, no respiratory distress, Lung sounds clear, patient is on room air  CV:  Auscultation of heart done, rate and rhythm irregularly irregular, no murmur, no rub or gallop. Edema none bilateral lower extremities.   ABDOMEN:  normal bowel sounds, soft, nontender, no palpable masses.  M/S:   Gait and station ambulates independently, no tenderness or swelling of the joints; able to move all extremities, digits normal  NEURO: cranial nerves 2-12 grossly intact, no facial asymmetry, no speech deficits and able to follow directions, moves all extremities symmetrically  PSYCH:  insight and judgement and memory intact, affect and mood normal     SNF labs:   Most Recent 3 CBC's:  Recent Labs   Lab Test 03/12/19  0820 03/06/19  0610 03/05/19  1622   WBC 7.7 4.6 6.2   HGB 13.0 11.6* 11.9   MCV 92 96 94   PLT 98* 100* 100*     Most Recent 3 BMP's:  Recent Labs   Lab Test 03/12/19  0820 03/06/19  0610 03/05/19  1622    136 135   POTASSIUM 4.1 3.9 4.0   CHLORIDE 103 105 103   CO2 27 27 24   BUN 25 19 25   CR 0.84 0.97 0.96   ANIONGAP 6 4 8   NADIA 9.4 8.5 8.6   GLC 80 83 99       DISCHARGE PLAN:    Follow up labs: INR on 3/15 and TCU provider to dose. After discharge inr checks and dosing per PCP    Medical Follow Up:      Follow up with primary care provider in 2-4 weeks    MTM referral needed and placed by this provider: No    Current [place] scheduled appointments:   No future appointments.     Discharge Services: Home Care:  Occupational Therapy, Physical Therapy, Registered Nurse, Home Health Aide,  and From:  [facility]    Discharge Instructions Verbalized to Patient at Discharge:     None      TOTAL DISCHARGE TIME:   Greater than 30 minutes  Electronically signed by:  RADHA Anne CNP     Documentation of Face to Face and Certification for Home Health  Services    I certify that patient: Tameka Andersen is under my care and that I, or a nurse practitioner or physician's assistant working with me, had a face-to-face encounter that meets the physician face-to-face encounter requirements with this patient on: 3/14/2019.    This encounter with the patient was in whole, or in part, for the following medical condition, which is the primary reason for home health care: weakness after recent norovirus diarrhea.    I certify that, based on my findings, the following services are medically necessary home health services: Nursing, Occupational Therapy, Physical Therapy and HHA and SW.    My clinical findings support the need for the above services because: Nurse is needed: To assess status and VS after changes in medications or other medical regimen.., Occupational Therapy Services are needed to assess and treat cognitive ability and address ADL safety due to impairment in self care ability., Physical Therapy Services are needed to assess and treat the following functional impairments: weakness and HHA for bathing and SW to assist with services.    Further, I certify that my clinical findings support that this patient is homebound (i.e. absences from home require considerable and taxing effort and are for medical reasons or [employer] services or infrequently or of short duration when for other reasons) because: Requires assistance of another person or specialized equipment to access medical services because patient: Is unable to exit home safely on own due to: weakness, deconditioning...    Based on the above findings. I certify that this patient is confined to the home and needs intermittent skilled nursing care, physical therapy and/or speech therapy.  The patient is under my care, and I have initiated the establishment of the plan of care.  This patient will be followed by a physician who will periodically review the plan of care.  Physician/Provider to provide follow up  care: Markie Becker    Attending hospital physician (the Medicare certified [facility] provider): Dr Hayward  Physician Signature: See electronic signature associated with these discharge orders.  Date: 3/14/2019

## 2019-03-14 NOTE — LETTER
3/14/2019        RE: Tameka Andersen  [address]   [place] [address]        [facility] DISCHARGE SUMMARY  PATIENT'S NAME: Tameka Andersen  YOB: 1924  MEDICAL RECORD NUMBER:  [id number]  Place of Service where encounter took place:  [facility]  (S) [[id number]]    PRIMARY CARE PROVIDER AND CLINIC RESPONSIBLE AFTER TRANSFER:   Markie Becker MD, [address]   Non-[facility] Provider     Transferring providers: RADHA Anne CNP, Ena Godfrey MD  Recent Hospitalization/ED: [facility] stay 3/5/2019 through 3/6/2019.  Date of SNF Admission: March / 06 / 2019  Date of SNF (anticipated) Discharge: March / 16 / 2019  Discharged to: previous independent home  Cognitive Scores: CPT: 5.4/5.6 and SLUMS 16/30  Physical Function: Ambulating 100 ft with independently  DME: none    CODE STATUS/ADVANCE DIRECTIVES DISCUSSION:  DNR / DNI  ALLERGIES: Thorazine [chlorpromazine hcl]    Brief Summary of Hospital Course:   89 year old female with hx a fib (on Coumadin), lung cancer (s/p wedge resection w/o further treatment), and thoracic compression fx (1/2019) admitted with diarrhea, SOB, palpitations, lightheadedness, and fatigue and diagnosed with norovirus. HR managed with metoprolol, coumadin on hold due to INR elevated. Discharged to TCU for therapies, may need higher level of care at completion of TCU stay.     DISCHARGE DIAGNOSIS/NURSING FACILITY COURSE:   Non-intractable vomiting with nausea, unspecified vomiting type  Diarrhea of infectious origin  At TCU GI symptoms resolved. Does report one loose stool yesterday, none today. No n/v. No fevers.   Lab Results   Component Value Date    WBC 7.7 03/12/2019    WBC 4.6 03/06/2019       Shortness of breath  History of lung cancer  Improved, symptoms resolved. Sats 97% on room air. No cough.     Light-headedness  Palpitations  Weakness  Physical  "deconditioning  Symptoms improved, regained strength. Ready to discharge home with therapies PT, OT, RN, HHA and SW. SBP range 116-133. Down 2 lbs since admission.     Chronic atrial fibrillation (H)  Takes metoprolol. On Coumadin and INR today 1.5   On 3/13 INR 1.5 took 4 mg  On 3/12 INR 1.5 took 2 mg  On 3/9 INR 2.3 took 2 mg daily  On 3/8 INR 3.3 dose held  On 3/7 INR 3.8 dose held    Closed compression fracture of thoracic vertebra with routine healing, subsequent encounter  Denies back pain.     Past Medical History:  has a past medical history of A-fib (H), Anemia, Arthritis, and Cancer (H). She also has no past medical history of Chronic infection, History of blood transfusion, Malignant hyperthermia, or Sleep apnea.    Discharge Medications:  Current Outpatient Medications   Medication Sig Dispense Refill     ACETAMINOPHEN PO Take 1,000 mg by mouth every 6 hours as needed for pain       cholecalciferol (VITAMIN D3) 5000 units TABS tablet Take 5,000 Units by mouth daily       METOPROLOL TARTRATE PO Take 25 mg by mouth 2 times daily        Propylene Glycol-Glycerin (SOOTHE OP) Place 1 drop into both eyes 2 times daily        WARFARIN SODIUM PO PER INR ORDERS       Medication Changes/Rationale:     Adjusting coumadin dose as above    Controlled medications sent with patient:   not applicable/none     ROS:   10 point ROS of systems including Constitutional, Eyes, Respiratory, Cardiovascular, Gastroenterology, Genitourinary, Integumentary, Musculoskeletal, Psychiatric were all negative except for pertinent positives noted in my HPI.    Physical Exam:   Vitals: /69   Pulse 82   Temp 97.6  F (36.4  C)   Resp 16   Ht 1.499 m (4' 11\")   Wt 62.1 kg (137 lb)   SpO2 97%   BMI 27.67 kg/m     BMI= Body mass index is 27.67 kg/m .  GENERAL APPEARANCE:  Alert, in no distress, pleasant, cooperative, oriented x 4  EYES:  EOM, lids, pupils and irises normal, sclera clear and conjunctiva normal, no discharge or " mattering on lids or lashes noted  ENT:  Mouth normal, moist mucous membranes, nose normal without drainage or crusting, external ears without lesions, hearing acuity intact  RESP:  respiratory effort normal, no chest wall tenderness, no respiratory distress, Lung sounds clear, patient is on room air  CV:  Auscultation of heart done, rate and rhythm irregularly irregular, no murmur, no rub or gallop. Edema none bilateral lower extremities.   ABDOMEN:  normal bowel sounds, soft, nontender, no palpable masses.  M/S:   Gait and station ambulates independently, no tenderness or swelling of the joints; able to move all extremities, digits normal  NEURO: cranial nerves 2-12 grossly intact, no facial asymmetry, no speech deficits and able to follow directions, moves all extremities symmetrically  PSYCH:  insight and judgement and memory intact, affect and mood normal     SNF labs:   Most Recent 3 CBC's:  Recent Labs   Lab Test 03/12/19  0820 03/06/19  0610 03/05/19  1622   WBC 7.7 4.6 6.2   HGB 13.0 11.6* 11.9   MCV 92 96 94   PLT 98* 100* 100*     Most Recent 3 BMP's:  Recent Labs   Lab Test 03/12/19  0820 03/06/19  0610 03/05/19  1622    136 135   POTASSIUM 4.1 3.9 4.0   CHLORIDE 103 105 103   CO2 27 27 24   BUN 25 19 25   CR 0.84 0.97 0.96   ANIONGAP 6 4 8   NADIA 9.4 8.5 8.6   GLC 80 83 99       DISCHARGE PLAN:    Follow up labs: INR on 3/15 and TCU provider to dose. After discharge inr checks and dosing per PCP    Medical Follow Up:      Follow up with primary care provider in 2-4 weeks    MT referral needed and placed by this provider: No    Current [place] scheduled appointments:   No future appointments.     Discharge Services: Home Care:  Occupational Therapy, Physical Therapy, Registered Nurse, Home Health Aide,  and From:  [facility]    Discharge Instructions Verbalized to Patient at Discharge:     None      TOTAL DISCHARGE TIME:   Greater than 30 minutes  Electronically signed  by:  RADHA Anne CNP     Documentation of Face to Face and Certification for Home Health Services    I certify that patient: Tameka Andersen is under my care and that I, or a nurse practitioner or physician's assistant working with me, had a face-to-face encounter that meets the physician face-to-face encounter requirements with this patient on: 3/14/2019.    This encounter with the patient was in whole, or in part, for the following medical condition, which is the primary reason for home health care: weakness after recent norovirus diarrhea.    I certify that, based on my findings, the following services are medically necessary home health services: Nursing, Occupational Therapy, Physical Therapy and HHA and SW.    My clinical findings support the need for the above services because: Nurse is needed: To assess status and VS after changes in medications or other medical regimen.., Occupational Therapy Services are needed to assess and treat cognitive ability and address ADL safety due to impairment in self care ability., Physical Therapy Services are needed to assess and treat the following functional impairments: weakness and HHA for bathing and SW to assist with services.    Further, I certify that my clinical findings support that this patient is homebound (i.e. absences from home require considerable and taxing effort and are for medical reasons or [employer] services or infrequently or of short duration when for other reasons) because: Requires assistance of another person or specialized equipment to access medical services because patient: Is unable to exit home safely on own due to: weakness, deconditioning...    Based on the above findings. I certify that this patient is confined to the home and needs intermittent skilled nursing care, physical therapy and/or speech therapy.  The patient is under my care, and I have initiated the establishment of the plan of care.  This patient will be followed by  a physician who will periodically review the plan of care.  Physician/Provider to provide follow up care: Markie Becker    Attending hospital physician (the Medicare certified [facility] provider): Dr Hayward  Physician Signature: See electronic signature associated with these discharge orders.  Date: 3/14/2019                  Sincerely,        RADHA Anne CNP

## 2019-03-14 NOTE — PATIENT INSTRUCTIONS
[facility] Discharge Orders    Name: Tameka Andersen  : 1924  Planned Discharge Date: 3/16/19  Discharged to: previous independent home    MEDICAL FOLLOW UP  Follow up with PCP in 2-4 weeks for continuity of care      FUTURE LABS: INR on 3/15 and TCU provider to dose.   After discharge INR checks and dosing per PCP    ORDER CHANGES:  Coumadin 5 mg on 3/14 diagnosis a fib    DISCHARGE MEDICATIONS:  The patient s pharmacy is authorized to dispense a 30-day supply of medications. Refill requests should be directed to the primary provider, Markie Becker.   No narcotics are prescribed at time of discharge.   Current Outpatient Medications   Medication Sig Dispense Refill     ACETAMINOPHEN PO Take 1,000 mg by mouth every 6 hours as needed for pain       cholecalciferol (VITAMIN D3) 5000 units TABS tablet Take 5,000 Units by mouth daily       METOPROLOL TARTRATE PO Take 25 mg by mouth 2 times daily        Propylene Glycol-Glycerin (SOOTHE OP) Place 1 drop into both eyes 2 times daily        WARFARIN SODIUM PO PER INR ORDERS         SERVICES:  Home Care:  Occupational Therapy, Physical Therapy, Registered Nurse, Home Health Aide,  and From:  [facility]    ADDITIONAL INSTRUCTIONS:  None    RADHA Anne CNP  This document was electronically signed on 2019

## 2019-03-15 ENCOUNTER — NURSING HOME VISIT (OUTPATIENT)
Dept: GERIATRICS | Facility: CLINIC | Age: 84
End: 2019-03-15
Payer: MEDICARE

## 2019-03-15 VITALS
TEMPERATURE: 97.7 F | HEART RATE: 98 BPM | OXYGEN SATURATION: 95 % | DIASTOLIC BLOOD PRESSURE: 78 MMHG | SYSTOLIC BLOOD PRESSURE: 132 MMHG | BODY MASS INDEX: 27.62 KG/M2 | HEIGHT: 59 IN | RESPIRATION RATE: 16 BRPM | WEIGHT: 137 LBS

## 2019-03-15 DIAGNOSIS — Z51.81 ENCOUNTER FOR MONITORING COUMADIN THERAPY: ICD-10-CM

## 2019-03-15 DIAGNOSIS — Z79.01 ENCOUNTER FOR MONITORING COUMADIN THERAPY: ICD-10-CM

## 2019-03-15 DIAGNOSIS — I48.20 CHRONIC ATRIAL FIBRILLATION (H): Primary | ICD-10-CM

## 2019-03-15 PROCEDURE — 99308 SBSQ NF CARE LOW MDM 20: CPT | Performed by: NURSE PRACTITIONER

## 2019-03-15 ASSESSMENT — MIFFLIN-ST. JEOR: SCORE: 922.06

## 2019-03-15 NOTE — PROGRESS NOTES
"[place] [facility]  [place] Medical Record Number:  [id number]  Place of Service where encounter took place: [facility]  (S) [[id number]]    HPI:    Tameka Andersen is a 89 year old  (1/7/1924), who is being seen today for an episodic care visit at the above location.   HPI information obtained from: facility chart records, facility staff, patient report and [facility] chart review. Today's concern is INR/Coumadin management for A. Fib    ROS/Subjective:  Bleeding Signs/Symptoms:  None  Thromboembolic Signs/Symptoms:  None  Medication Changes:  No  Dietary Changes:  No  Activity Changes: No  Bacterial/Viral Infection:  No  Missed Coumadin Doses:  None  On ASA: No  Other Concerns:  No    OBJECTIVE:  /78   Pulse 98   Temp 97.7  F (36.5  C)   Resp 16   Ht 1.499 m (4' 11\")   Wt 62.1 kg (137 lb)   SpO2 95%   BMI 27.67 kg/m    Last INR: 1.5 on 3/12/19  INR Today:  1.7  Current Dose:  2 mg    ASSESSMENT:  (I48.2) Chronic atrial fibrillation (H)  (primary encounter diagnosis)  (Z51.81,  Z79.01) Encounter for monitoring Coumadin therapy  Comment: subtherapeutic  Subtherapeutic INR for goal of 2-3    PLAN:  New Dose: 5 mg daily    Next INR: 3/18/19      Electronically signed by:  RADHA Solano CNP     "

## 2019-03-15 NOTE — LETTER
"    3/15/2019        RE: Tameka Andersen  [address] [place] [address] [place] [address]        [facility] [facility]  [place] Medical Record Number:  [id number]  Place of Service where encounter took place: [facility]  (S) [[id number]]    HPI:    Tameka Andersen is a 89 year old  (1/7/1924), who is being seen today for an episodic care visit at the above location.   HPI information obtained from: facility chart records, facility staff, patient report and [facility] chart review. Today's concern is INR/Coumadin management for A. Fib    ROS/Subjective:  Bleeding Signs/Symptoms:  None  Thromboembolic Signs/Symptoms:  None  Medication Changes:  No  Dietary Changes:  No  Activity Changes: No  Bacterial/Viral Infection:  No  Missed Coumadin Doses:  None  On ASA: No  Other Concerns:  No    OBJECTIVE:  /78   Pulse 98   Temp 97.7  F (36.5  C)   Resp 16   Ht 1.499 m (4' 11\")   Wt 62.1 kg (137 lb)   SpO2 95%   BMI 27.67 kg/m     Last INR: 1.5 on 3/12/19  INR Today:  1.7  Current Dose:  2 mg    ASSESSMENT:  (I48.2) Chronic atrial fibrillation (H)  (primary encounter diagnosis)  (Z51.81,  Z79.01) Encounter for monitoring Coumadin therapy  Comment: subtherapeutic  Subtherapeutic INR for goal of 2-3    PLAN:  New Dose: 5 mg daily    Next INR: 3/18/19      Electronically signed by:  RADHA Solano CNP         Sincerely,        RDAHA Solano CNP    "

## 2019-05-16 ENCOUNTER — HOSPITAL ENCOUNTER (INPATIENT)
Facility: CLINIC | Age: 84
LOS: 2 days | Discharge: HOME HEALTH | DRG: 603 | End: 2019-05-18
Attending: EMERGENCY MEDICINE | Admitting: INTERNAL MEDICINE
Payer: MEDICARE

## 2019-05-16 ENCOUNTER — APPOINTMENT (OUTPATIENT)
Dept: GENERAL RADIOLOGY | Facility: CLINIC | Age: 84
DRG: 603 | End: 2019-05-16
Attending: EMERGENCY MEDICINE
Payer: MEDICARE

## 2019-05-16 DIAGNOSIS — Z79.01 CHRONIC ANTICOAGULATION: ICD-10-CM

## 2019-05-16 DIAGNOSIS — M10.9 ACUTE GOUT OF RIGHT WRIST, UNSPECIFIED CAUSE: Primary | ICD-10-CM

## 2019-05-16 DIAGNOSIS — C34.90 MALIGNANT NEOPLASM OF LUNG, UNSPECIFIED LATERALITY, UNSPECIFIED PART OF LUNG (H): ICD-10-CM

## 2019-05-16 DIAGNOSIS — L03.119 CELLULITIS OF HAND: ICD-10-CM

## 2019-05-16 DIAGNOSIS — L08.9 INFECTION OF THUMB: ICD-10-CM

## 2019-05-16 LAB
ALBUMIN SERPL-MCNC: 3.3 G/DL (ref 3.4–5)
ALP SERPL-CCNC: 68 U/L (ref 40–150)
ALT SERPL W P-5'-P-CCNC: 12 U/L (ref 0–50)
ANION GAP SERPL CALCULATED.3IONS-SCNC: 5 MMOL/L (ref 3–14)
ANISOCYTOSIS BLD QL SMEAR: SLIGHT
APTT PPP: 43 SEC (ref 22–37)
AST SERPL W P-5'-P-CCNC: 17 U/L (ref 0–45)
BASOPHILS # BLD AUTO: 0.1 10E9/L (ref 0–0.2)
BASOPHILS NFR BLD AUTO: 1 %
BILIRUB SERPL-MCNC: 0.5 MG/DL (ref 0.2–1.3)
BUN SERPL-MCNC: 27 MG/DL (ref 7–30)
CALCIUM SERPL-MCNC: 9 MG/DL (ref 8.5–10.1)
CHLORIDE SERPL-SCNC: 104 MMOL/L (ref 94–109)
CO2 SERPL-SCNC: 27 MMOL/L (ref 20–32)
CREAT SERPL-MCNC: 0.91 MG/DL (ref 0.52–1.04)
CRP SERPL-MCNC: 53.9 MG/L (ref 0–8)
DIFFERENTIAL METHOD BLD: ABNORMAL
EOSINOPHIL # BLD AUTO: 0 10E9/L (ref 0–0.7)
EOSINOPHIL NFR BLD AUTO: 0 %
ERYTHROCYTE [DISTWIDTH] IN BLOOD BY AUTOMATED COUNT: 16.1 % (ref 10–15)
ERYTHROCYTE [SEDIMENTATION RATE] IN BLOOD BY WESTERGREN METHOD: 37 MM/H (ref 0–30)
GFR SERPL CREATININE-BSD FRML MDRD: 53 ML/MIN/{1.73_M2}
GLUCOSE SERPL-MCNC: 147 MG/DL (ref 70–99)
HCT VFR BLD AUTO: 34.7 % (ref 35–47)
HGB BLD-MCNC: 11.6 G/DL (ref 11.7–15.7)
INR PPP: 2.18 (ref 0.86–1.14)
LACTATE BLD-SCNC: 1.7 MMOL/L (ref 0.7–2)
LYMPHOCYTES # BLD AUTO: 0.4 10E9/L (ref 0.8–5.3)
LYMPHOCYTES NFR BLD AUTO: 4 %
MCH RBC QN AUTO: 31.5 PG (ref 26.5–33)
MCHC RBC AUTO-ENTMCNC: 33.4 G/DL (ref 31.5–36.5)
MCV RBC AUTO: 94 FL (ref 78–100)
MONOCYTES # BLD AUTO: 3.8 10E9/L (ref 0–1.3)
MONOCYTES NFR BLD AUTO: 38 %
NEUTROPHILS # BLD AUTO: 5.8 10E9/L (ref 1.6–8.3)
NEUTROPHILS NFR BLD AUTO: 57 %
PLATELET # BLD AUTO: 101 10E9/L (ref 150–450)
PLATELET # BLD EST: ABNORMAL 10*3/UL
POTASSIUM SERPL-SCNC: 4.1 MMOL/L (ref 3.4–5.3)
PROT SERPL-MCNC: 7.4 G/DL (ref 6.8–8.8)
RBC # BLD AUTO: 3.68 10E12/L (ref 3.8–5.2)
SODIUM SERPL-SCNC: 136 MMOL/L (ref 133–144)
VARIANT LYMPHS BLD QL SMEAR: PRESENT
WBC # BLD AUTO: 10.1 10E9/L (ref 4–11)

## 2019-05-16 PROCEDURE — 85652 RBC SED RATE AUTOMATED: CPT | Performed by: EMERGENCY MEDICINE

## 2019-05-16 PROCEDURE — 85610 PROTHROMBIN TIME: CPT | Performed by: EMERGENCY MEDICINE

## 2019-05-16 PROCEDURE — 87040 BLOOD CULTURE FOR BACTERIA: CPT | Performed by: EMERGENCY MEDICINE

## 2019-05-16 PROCEDURE — 85025 COMPLETE CBC W/AUTO DIFF WBC: CPT | Performed by: EMERGENCY MEDICINE

## 2019-05-16 PROCEDURE — 96365 THER/PROPH/DIAG IV INF INIT: CPT

## 2019-05-16 PROCEDURE — 25000132 ZZH RX MED GY IP 250 OP 250 PS 637: Performed by: EMERGENCY MEDICINE

## 2019-05-16 PROCEDURE — A9270 NON-COVERED ITEM OR SERVICE: HCPCS | Performed by: PHYSICIAN ASSISTANT

## 2019-05-16 PROCEDURE — 85730 THROMBOPLASTIN TIME PARTIAL: CPT | Performed by: EMERGENCY MEDICINE

## 2019-05-16 PROCEDURE — 83605 ASSAY OF LACTIC ACID: CPT | Performed by: EMERGENCY MEDICINE

## 2019-05-16 PROCEDURE — 12000011 ZZH R&B MS OVERFLOW

## 2019-05-16 PROCEDURE — 86140 C-REACTIVE PROTEIN: CPT | Performed by: EMERGENCY MEDICINE

## 2019-05-16 PROCEDURE — 25000128 H RX IP 250 OP 636: Performed by: INTERNAL MEDICINE

## 2019-05-16 PROCEDURE — 25000128 H RX IP 250 OP 636: Performed by: EMERGENCY MEDICINE

## 2019-05-16 PROCEDURE — 36415 COLL VENOUS BLD VENIPUNCTURE: CPT | Performed by: EMERGENCY MEDICINE

## 2019-05-16 PROCEDURE — 96366 THER/PROPH/DIAG IV INF ADDON: CPT

## 2019-05-16 PROCEDURE — 99285 EMERGENCY DEPT VISIT HI MDM: CPT | Mod: 25

## 2019-05-16 PROCEDURE — 99223 1ST HOSP IP/OBS HIGH 75: CPT | Mod: AI | Performed by: INTERNAL MEDICINE

## 2019-05-16 PROCEDURE — 25000132 ZZH RX MED GY IP 250 OP 250 PS 637: Performed by: PHYSICIAN ASSISTANT

## 2019-05-16 PROCEDURE — 80053 COMPREHEN METABOLIC PANEL: CPT | Performed by: EMERGENCY MEDICINE

## 2019-05-16 PROCEDURE — 25000132 ZZH RX MED GY IP 250 OP 250 PS 637: Performed by: INTERNAL MEDICINE

## 2019-05-16 PROCEDURE — 73130 X-RAY EXAM OF HAND: CPT | Mod: RT

## 2019-05-16 PROCEDURE — 96361 HYDRATE IV INFUSION ADD-ON: CPT

## 2019-05-16 PROCEDURE — A9270 NON-COVERED ITEM OR SERVICE: HCPCS | Performed by: EMERGENCY MEDICINE

## 2019-05-16 PROCEDURE — A9270 NON-COVERED ITEM OR SERVICE: HCPCS | Performed by: INTERNAL MEDICINE

## 2019-05-16 RX ORDER — AMOXICILLIN 250 MG
2 CAPSULE ORAL 2 TIMES DAILY PRN
Status: DISCONTINUED | OUTPATIENT
Start: 2019-05-16 | End: 2019-05-18 | Stop reason: HOSPADM

## 2019-05-16 RX ORDER — DOCUSATE SODIUM 100 MG/1
100 CAPSULE, LIQUID FILLED ORAL 2 TIMES DAILY
Status: DISCONTINUED | OUTPATIENT
Start: 2019-05-16 | End: 2019-05-18 | Stop reason: HOSPADM

## 2019-05-16 RX ORDER — WARFARIN SODIUM 5 MG/1
5 TABLET ORAL EVERY EVENING
COMMUNITY

## 2019-05-16 RX ORDER — CEPHALEXIN 500 MG/1
500 CAPSULE ORAL 2 TIMES DAILY
COMMUNITY

## 2019-05-16 RX ORDER — BENZONATATE 100 MG/1
100 CAPSULE ORAL 3 TIMES DAILY PRN
Status: DISCONTINUED | OUTPATIENT
Start: 2019-05-16 | End: 2019-05-18 | Stop reason: HOSPADM

## 2019-05-16 RX ORDER — ONDANSETRON 2 MG/ML
4 INJECTION INTRAMUSCULAR; INTRAVENOUS EVERY 6 HOURS PRN
Status: DISCONTINUED | OUTPATIENT
Start: 2019-05-16 | End: 2019-05-18 | Stop reason: HOSPADM

## 2019-05-16 RX ORDER — ACETAMINOPHEN 325 MG/1
650 TABLET ORAL EVERY 4 HOURS PRN
Status: DISCONTINUED | OUTPATIENT
Start: 2019-05-16 | End: 2019-05-18 | Stop reason: HOSPADM

## 2019-05-16 RX ORDER — AMOXICILLIN 250 MG
1 CAPSULE ORAL 2 TIMES DAILY PRN
Status: DISCONTINUED | OUTPATIENT
Start: 2019-05-16 | End: 2019-05-18 | Stop reason: HOSPADM

## 2019-05-16 RX ORDER — METOPROLOL TARTRATE 25 MG/1
25 TABLET, FILM COATED ORAL 2 TIMES DAILY
Status: DISCONTINUED | OUTPATIENT
Start: 2019-05-17 | End: 2019-05-18 | Stop reason: HOSPADM

## 2019-05-16 RX ORDER — NALOXONE HYDROCHLORIDE 0.4 MG/ML
.1-.4 INJECTION, SOLUTION INTRAMUSCULAR; INTRAVENOUS; SUBCUTANEOUS
Status: DISCONTINUED | OUTPATIENT
Start: 2019-05-16 | End: 2019-05-16

## 2019-05-16 RX ORDER — NALOXONE HYDROCHLORIDE 0.4 MG/ML
.1-.4 INJECTION, SOLUTION INTRAMUSCULAR; INTRAVENOUS; SUBCUTANEOUS
Status: DISCONTINUED | OUTPATIENT
Start: 2019-05-16 | End: 2019-05-18 | Stop reason: HOSPADM

## 2019-05-16 RX ORDER — ONDANSETRON 4 MG/1
4 TABLET, ORALLY DISINTEGRATING ORAL EVERY 6 HOURS PRN
Status: DISCONTINUED | OUTPATIENT
Start: 2019-05-16 | End: 2019-05-18 | Stop reason: HOSPADM

## 2019-05-16 RX ORDER — ACETAMINOPHEN 650 MG/1
650 SUPPOSITORY RECTAL EVERY 4 HOURS PRN
Status: DISCONTINUED | OUTPATIENT
Start: 2019-05-16 | End: 2019-05-18 | Stop reason: HOSPADM

## 2019-05-16 RX ORDER — POLYETHYLENE GLYCOL 3350 17 G/17G
17 POWDER, FOR SOLUTION ORAL DAILY PRN
Status: DISCONTINUED | OUTPATIENT
Start: 2019-05-16 | End: 2019-05-18 | Stop reason: HOSPADM

## 2019-05-16 RX ORDER — CEFAZOLIN SODIUM 1 G/50ML
1 INJECTION, SOLUTION INTRAVENOUS EVERY 8 HOURS
Status: DISCONTINUED | OUTPATIENT
Start: 2019-05-16 | End: 2019-05-18 | Stop reason: HOSPADM

## 2019-05-16 RX ORDER — ACETAMINOPHEN 500 MG
1000 TABLET ORAL ONCE
Status: COMPLETED | OUTPATIENT
Start: 2019-05-16 | End: 2019-05-16

## 2019-05-16 RX ORDER — CEFAZOLIN SODIUM 1 G/50ML
1 INJECTION, SOLUTION INTRAVENOUS ONCE
Status: COMPLETED | OUTPATIENT
Start: 2019-05-16 | End: 2019-05-16

## 2019-05-16 RX ADMIN — CEFAZOLIN SODIUM 1 G: 1 INJECTION, SOLUTION INTRAVENOUS at 22:16

## 2019-05-16 RX ADMIN — CEFAZOLIN SODIUM 1 G: 1 INJECTION, SOLUTION INTRAVENOUS at 13:53

## 2019-05-16 RX ADMIN — ACETAMINOPHEN 1000 MG: 500 TABLET, FILM COATED ORAL at 11:25

## 2019-05-16 RX ADMIN — DOCUSATE SODIUM 100 MG: 100 CAPSULE, LIQUID FILLED ORAL at 21:03

## 2019-05-16 RX ADMIN — SODIUM CHLORIDE 500 ML: 9 INJECTION, SOLUTION INTRAVENOUS at 11:25

## 2019-05-16 RX ADMIN — GUAIFENESIN 10 ML: 100 SOLUTION ORAL at 21:03

## 2019-05-16 ASSESSMENT — MIFFLIN-ST. JEOR
SCORE: 837
SCORE: 825.66

## 2019-05-16 NOTE — PROGRESS NOTES
Patient examined in ED    There was concern for septic CMC joint of the right hand  Previous ORIF of distal radius and significant DJD  Light erythema from PIP to wrist, mild swelling    Was on course of oral Keflex with progression  Patient had left foot cellulitis recently treated with oral abx    At this time recommend IV abx  Elevate  Ice PRN  Will recheck first thing in AM  Hold Warfarin (A. Fib)    Carol Mayer PAC

## 2019-05-16 NOTE — LETTER
Transition Communication Hand-off for Care Transitions to Next Level of Care Provider    Name: Tameka Andersen  : 1924  MRN #: [id number]  Primary Care Provider: Markie Becker     Primary Clinic: [facility] [id number] [place] [id number]     Reason for Hospitalization:  Chronic anticoagulation [Z79.01]  Infection of thumb [L08.9]  Admit Date/Time: 2019 10:47 AM  Discharge Date: 19  Payor Source: Payor: MEDICARE / Plan: MEDICARE / Product Type: Medicare /     Readmission Assessment Measure (PAYAL) Risk Score/category: Elevated          Reason for Communication Hand-off Referral: Fragility    Discharge Plan:       Concern for non-adherence with plan of care:   Yes  Discharge Needs Assessment:      Already enrolled in Tele-monitoring program and name of program:  NA  Follow-up specialty is recommended: No    Follow-up plan:  No future appointments.    Any outstanding tests or procedures:        Referrals     Future Labs/Procedures    Home care nursing referral     Comments:    RN skilled nursing visit. Resume prior home care orders  Home health aide to assist with ADLs, bathing    Your provider has ordered home care nursing services. If you have not been contacted within 2 days of your discharge please call the inpatient department phone number at [phone] .    Home Care OT Referral for Hospital Discharge     Comments:    OT to eval and treat    Your provider has ordered home care - occupational therapy. If you have not been contacted within 2 days of your discharge please call the department phone number listed on the top of this document.    Home Care PT Referral for Hospital Discharge     Comments:    PT to eval and treat    Your provider has ordered home care - physical therapy. If you have not been contacted within 2 days of your discharge please call the department phone number listed on the top of this document.    Home Care SLP Referral for Hospital Discharge      Comments:    SLP to eval and treat    Your provider has ordered home care - speech therapy. If you have not been contacted within 2 days of your discharge please call the department phone number listed on the top of this document.    Home Care Social Service Referral for Hospital Discharge     Comments:     to continue prior home care orders    Your provider has ordered home care - . If you have not been contacted within 2 days of your discharge please call the department phone number listed on the top of this document.            Key Recommendations:  CTS identifies pt as high risk due to Elevated PAYAL. Patient was admitted to [facility] on 5/16/19 for weakness and R thumb/ wrist cellulitis/ infection. Ortho has been consulted. Patient has a history of Afib on coumadin, metastatic lung cancer, and thoracic compression fracture. Patient has recently had outpatient consults for Palliative care, hospice meeting, nutrition, pulmonary, speech and GI. Patient is currently receiving home care RN/PT/OT/HHA/SW through [facility]. Patient was hospitalized twice in March 2019 with weakness and n/v/d and was discharged to [facility] TCU in March. Patient lives independently at [facility]. Pt is to have her INR rechecked on Monday 5/20.     Rocio Bonds/ BE Gilliland RN CTS    AVS/Discharge Summary is the source of truth; this is a helpful guide for improved communication of patient story

## 2019-05-16 NOTE — H&P
[facility]  Hospitalist Admission Note  Name: Tameka Andersen    MRN: [id number]  YOB: 1924    Age: 89 year old  Date of admission: 5/16/2019  Primary care provider: Markie Becker    Chief Complaint:  Thumb swelling/pain/erythema    Assessment and Plan:     Tameka Andersen is a 89 year old female with PMH including chronic atrial fibrillation (on Coumadin), lung cancer (s/p wedge resection without further treatment), thoracic compression fracture (1/2019) who resides in independent living at [facility] who was admitted on 5/16/2019 with progressive warmth, erythema, pain and swelling of the right thumb and wrist consistent with cellulitis and possible joint infection.    1.  Right thumb/wrist cellulitis: Patient developed pain, swelling and erythema in her right thumb and wrist approximately 3 days ago.  She went to urgent care last night and was prescribed Keflex but was told to present to the emergency room if she did not improve.  Despite taking 2 doses of Keflex her symptoms progressed.  Her temperature is 99.2 (but she did have Tylenol).  She has no leukocytosis but has elevated inflammatory markers.  There is concerned that this could represent a joint infection.  Orthopedics did evaluate her in the emergency room and for now recommends IV antibiotics but hold Coumadin and they will reevaluate tomorrow morning.  -IV Ancef  -Orthopedics consult  -Hold Coumadin, n.p.o. at midnight for possible surgical procedure  -Follow-up blood cultures  -Pain control with Tylenol (patient states she does not want stronger pain medications,  She has had dry mouth and constipation with oxycodone)  -Avoid NSAIDs given anticoagulation    2.  Metastatic lung cancer: Patient had prior wedge resection and shows not to treat this further.  She states that she has metastases to her vocal cord, lymph nodes, adrenal gland and scapula.  She does have chronic shortness of breath and cough related to this  but it has been unchanged over the past several months.    3.  Chronic atrial fibrillation: Patient is rate controlled with metoprolol.  Prior to admission she was on Coumadin.  Her INR is therapeutic at this time.  Will hold Coumadin per orthopedic recommendations in case she does need surgical procedure.  We will wait on reversing her in case she does not require surgical intervention.  -Hold Coumadin  -Continue metoprolol with hold parameters    4.  Weakness: Normally patient ambulates without an assistive device.  She resides at the [facility] living.  She does feel weaker than normal due to not feeling ill from her cellulitis.    Diet: Regular diet, n.p.o. at midnight in case she requires surgical intervention  DVT Prophylaxis: Pneumatic Compression Devices, hold Coumadin as above  Scruggs Catheter: not present  Code Status: DNR/DNI, reviewed with the patient and her niece    Disposition Plan   Expected discharge: Greater than 48 hours, recommended to prior living arrangement once antibiotic plan established and safe disposition plan/ TCU bed available.  Entered: Katie Valladares MD 05/16/2019, 2:16 PM     The patient's care was discussed with the Patient and Patient's Family.    Katie Valladares MD  [facility]      History of Present Illness:  Tameka Andersen is a 89 year old female with PMH including chronic atrial fibrillation (on Coumadin), lung cancer (s/p wedge resection without further treatment), thoracic compression fracture (1/2019) who resides in independent living at the [facility] who was admitted on 5/16/2019 with progressive warmth, erythema, pain and swelling of the right thumb and wrist consistent with cellulitis and possible joint infection.  History was obtained through patient interview, chart review and discussion with Dr. Carter in the ER.    The patient was seen with her niece today.  She reports that 3 days ago she developed pain and swelling over the right thumb and wrist.   She could not use it because of the pain and it felt hot to the touch.  Her symptoms progressed and last night they went to urgent care.  She was prescribed Keflex and took a pill last night as well as this morning.  By provider last night told her that if her symptoms worsened overnight that she needs to come to the emergency room.  She states that despite taking the oral antibiotics her wrist was hotter, more painful and more swollen.    She states that she had a surgery for fractured wrist of the right arm in 1992.  She does have hardware in that area but has never had an infection there before.  She states on May 2 she was treated for cellulitis of her left foot.  At that time her foot was swollen, red and hot.  This completely resolved with oral antibiotics.    Yesterday she checked her temperature and it was elevated to 101.  She had chills today.  She does have nausea but no vomiting.  Denies chest pain or urinary symptoms.  She states that she tends to have hard stools.  No diarrhea.  She has a chronic cough and shortness of breath related to her lung cancer.  These have been unchanged over the past several weeks.  She does have poor appetite and low energy for many months.     Past Medical History:  Past Medical History:   Diagnosis Date     A-fib (H)      Anemia      Arthritis     osteoarthritis     Cancer (H)     lung     Past Surgical History:  Past Surgical History:   Procedure Laterality Date     ARTHROPLASTY ELBOW Left 6/12/2018    Procedure: ARTHROPLASTY ELBOW;  1.  Left total elbow arthroplasty using the Jeanne Coonrad/Morrey total elbow with a size small cemented ulna and a small cemented humerus.   2.  Open reduction internal fixation olecranon fracture using a tension band technique.   3.  Subcutaneous transposition ulnar nerve, left elbow. ;  Surgeon: Laci Panchal MD;  Location: [facility] OR     CHOLECYSTECTOMY       ENT SURGERY      tonsillectomy     GYN SURGERY      hysterectomy     LUNG SURGERY  "      Social History:  Social History     Tobacco Use     Smoking status: Never Smoker     Smokeless tobacco: Never Used   Substance Use Topics     Alcohol use: Yes     Comment: twice a month     Social History     Social History Narrative    Lives alone.  No children.  Retired.     Family History:  Family History   Problem Relation Age of Onset     Thrombosis Mother      Other - See Comments Father         appendicitis     Allergies:  Allergies   Allergen Reactions     Thorazine [Chlorpromazine Hcl]      Medications:  Medications Prior to Admission   Medication Sig Dispense Refill Last Dose     cephALEXin (KEFLEX) 500 MG capsule Take 500 mg by mouth 2 times daily For 10 days (5/15/19-5/25/19)   5/15/2019 at x2     cholecalciferol (VITAMIN D3) 5000 units TABS tablet Take 5,000 Units by mouth daily   5/15/2019 at Unknown time     METOPROLOL TARTRATE PO Take 25 mg by mouth 2 times daily    5/15/2019 at x2     Propylene Glycol-Glycerin (SOOTHE OP) Place 1 drop into both eyes 2 times daily    Past Month at Unknown time     warfarin (COUMADIN) 5 MG tablet Take 5 mg by mouth every evening Except 2.5 mg on Tuesday/Thursday/Saturday   5/15/2019 at pm 5 mg     ACETAMINOPHEN PO Take 1,000 mg by mouth every 6 hours as needed for pain   Unknown at Unknown time     Review of Systems:  A Comprehensive greater than 10 system review of systems was carried out.  Pertinent positives and negatives are noted above.  Otherwise negative for contributory information.     Physical Exam:  Blood pressure 125/74, pulse 88, temperature 99.2  F (37.3  C), temperature source Temporal, resp. rate 25, height 1.41 m (4' 7.5\"), weight 58.1 kg (128 lb), SpO2 91 %.  Wt Readings from Last 1 Encounters:   05/16/19 58.1 kg (128 lb)     Exam:   General: Alert, awake, no acute distress.  HEENT: NC/AT, eyes anicteric and without injection, EOMI, face symmetric.  Dentition WNL, MMM.  Cardiac: RRR, normal S1, S2.  No murmurs/g/r.  Bilateral pedal " edema  Pulmonary: Normal chest rise, normal work of breathing.  Lungs CTAB without crackles or wheezing  Abdomen: soft, non-tender, non-distended.  Normoactive BS.  No guarding or rebound tenderness.  Extremities: Right thumb (particular thenar eminence) tenderness and swelling in addition to erythema which extends up into the right wrist.  Warm, well perfused.  Skin: Erythema and warmth of the right thumb and wrist.  Warm and Dry.  Neuro: No focal deficits noted.  Speech clear.  Moving all extremities in bed.  Psych: Appropriate affect.  Alert and oriented x3    Data Reviewed Today:  Imaging:  Results for orders placed or performed during the hospital encounter of 05/16/19   XR Hand Right G/E 3 Views    Narrative    RIGHT HAND THREE VIEWS May 16, 2019 11:44 AM     HISTORY: Right thumb pain.    COMPARISON: None.      Impression    IMPRESSION: Advanced degenerative changes are noted involving the  right first CMC joint. Diffuse osteopenia. Postoperative changes of  plate and screw fixation and pin placement are noted in the distal  left radius. Irregularity of the distal left ulna likely related to a  healed fracture. No convincing radiographic evidence for osteomyelitis  in the right thumb.    BEHZAD ABURTO MD     Labs:  Recent Labs   Lab 05/16/19  1117   WBC 10.1   HGB 11.6*   HCT 34.7*   MCV 94   *     Recent Labs   Lab 05/16/19  1117      POTASSIUM 4.1   CHLORIDE 104   CO2 27   ANIONGAP 5   *   BUN 27   CR 0.91   GFRESTIMATED 53*   GFRESTBLACK 62   NADIA 9.0       Katie Valladares MD  Hospitalist  [facility]

## 2019-05-16 NOTE — ED NOTES
[facility]  ED Nurse Handoff Report    Tameka Andersen is a 89 year old female   ED Chief complaint: Joint Swelling  . ED Diagnosis:   Final diagnoses:   Infection of thumb   Chronic anticoagulation     Allergies:   Allergies   Allergen Reactions     Thorazine [Chlorpromazine Hcl]        Code Status: DNR / DNI  Activity level - Baseline/Home:  Independent. Activity Level - Current:   Stand with Assist. Lift room needed: No. Bariatric: No   Needed: No   Isolation: No. Infection: Not Applicable.     Vital Signs:   Vitals:    05/16/19 1130 05/16/19 1145 05/16/19 1200 05/16/19 1215   BP: 157/88 130/75 113/71 125/74   Pulse:  90 92 88   Resp:    25   Temp:       TempSrc:       SpO2: 95%  92% 91%   Weight:       Height:           Cardiac Rhythm:  ,      Pain level: 0-10 Pain Scale: 8  Patient confused: No. Patient Falls Risk: Yes.   Elimination Status: Has voided   Patient Report - Initial Complaint: hand/joint swelling. Focused Assessment:  Musculoskeletal - Pain Body Location:  (right/hand/5th finger redness/swelling/warmth)  Tests Performed: imaging/labs. Abnormal Results:   Labs Ordered and Resulted from Time of ED Arrival Up to the Time of Departure from the ED   CBC WITH PLATELETS DIFFERENTIAL - Abnormal; Notable for the following components:       Result Value    RBC Count 3.68 (*)     Hemoglobin 11.6 (*)     Hematocrit 34.7 (*)     RDW 16.1 (*)     Platelet Count 101 (*)     Absolute Lymphocytes 0.4 (*)     Absolute Monocytes 3.8 (*)     All other components within normal limits   COMPREHENSIVE METABOLIC PANEL - Abnormal; Notable for the following components:    Glucose 147 (*)     GFR Estimate 53 (*)     Albumin 3.3 (*)     All other components within normal limits   INR - Abnormal; Notable for the following components:    INR 2.18 (*)     All other components within normal limits   PARTIAL THROMBOPLASTIN TIME - Abnormal; Notable for the following components:    PTT 43 (*)     All other  components within normal limits   ERYTHROCYTE SEDIMENTATION RATE AUTO - Abnormal; Notable for the following components:    Sed Rate 37 (*)     All other components within normal limits   CRP INFLAMMATION - Abnormal; Notable for the following components:    CRP Inflammation 53.9 (*)     All other components within normal limits   LACTIC ACID WHOLE BLOOD   PULSE OXIMETRY NURSING   CARDIAC CONTINUOUS MONITORING   MEASURE URINE OUTPUT   PATIENT CARE ORDER   BLOOD CULTURE   BLOOD CULTURE     .   Treatments provided: MAR  Family Comments: niece present and supportive  OBS brochure/video discussed/provided to patient:  N/A  ED Medications:   Medications   0.9% sodium chloride BOLUS (500 mLs Intravenous New Bag 5/16/19 1125)   acetaminophen (TYLENOL) tablet 1,000 mg (1,000 mg Oral Given 5/16/19 1125)   ceFAZolin (ANCEF) intermittent infusion 1 g (1 g Intravenous New Bag 5/16/19 1353)     Drips infusing:  No  For the majority of the shift, the patient's behavior Green. Interventions performed were .     Severe Sepsis OR Septic Shock Diagnosis Present: No      ED Nurse Name/Phone Number: Tara Nessa,   3:02 PM        RECEIVING UNIT ED HANDOFF REVIEW    Above ED Nurse Handoff Report was reviewed: Yes  Reviewed by: Sushil Bynum on May 16, 2019 at 3:33 PM

## 2019-05-16 NOTE — PLAN OF CARE
ROOM # 213-2    Living Situation (if not independent, order SW consult): Indep living @ [facility]  Facility name:  : Sarah (niece)     Activity level at baseline: Indep.  Activity level on admit: SBA      Patient registered to observation; given Patient Bill of Rights; given the opportunity to ask questions about observation status and their plan of care.  Patient has been oriented to the observation room, bathroom and call light is in place.    Discussed discharge goals and expectations with patient/family.     OBSERVATION patient IN TIME: 1612

## 2019-05-16 NOTE — LETTER
Key Recommendations:  CTS identifies pt as high risk due to Elevated PAYAL. Patient was admitted to [facility] on 5/16/19 for weakness and R thumb/ wrist cellulitis/ infection. Ortho has been consulted. Patient has a history of Afib on coumadin, metastatic lung cancer, and thoracic compression fracture. Patient has recently had outpatient consults for Palliative care, hospice meeting, nutrition, pulmonary, speech and GI. Patient is currently receiving home care RN/PT/OT/HHA/SW through [facility]. Patient was hospitalized twice in March 2019 with weakness and n/v/d and was discharged to [facility] TCU in March. Patient lives independently at [facility].    Recommendations at discharge include ***    Rocio Bonds    AVS/Discharge Summary is the source of truth; this is a helpful guide for improved communication of patient story

## 2019-05-16 NOTE — ED TRIAGE NOTES
[facility]    [address]    [place] [address]    Phone:  [phone]    Fax:  [phone]                                       Dung Marrero   MRN: [id number]    Department:  [facility]   Date of Visit:  4/9/2018           Patient Information     Date Of Birth          1966        Your diagnoses for this visit were:     Acute chest pain     Abdominal pain, epigastric        You were seen by Truong Cedillo MD.      Follow-up Information     Follow up with [facility].    Specialty:  EMERGENCY MEDICINE    Why:  If symptoms worsen    Contact information:    [address]  [place] [address] [phone]        Discharge Instructions       Please take Zantac for your stomach acid.  Gave you a prescription of prednisone and inhaler to help with her breathing.  Please follow-up with your [facility] provider.   has given you resources to find a place tonight at the [facility].    24 Hour Appointment Hotline       To make an appointment at any [facility], call [phone] ([phone]). If you don't have a family doctor or clinic, we will help you find one. [facility] are conveniently located to serve the needs of you and your family.             Review of your medicines      START taking        Dose / Directions Last dose taken    albuterol 108 (90 BASE) MCG/ACT Inhaler   Commonly known as:  PROAIR HFA/PROVENTIL HFA/VENTOLIN HFA   Dose:  2 puff   Quantity:  1 Inhaler        Inhale 2 puffs into the lungs every 6 hours as needed for shortness of breath / dyspnea or wheezing   Refills:  0        ranitidine 150 MG tablet   Commonly known as:  ZANTAC   Dose:  300 mg   Quantity:  20 tablet        Take 2 tablets (300 mg) by mouth At Bedtime for 10 days   Refills:  0          CONTINUE these medicines which may have CHANGED, or have new prescriptions. If we are uncertain of the size  Right wrist area with pain, swelling and redness for the past 3 days. Worsening symptoms.,  Sent here from Urgent Care. Patient alert and oriented x3.  Airway, breathing and circulation intact.  History of lung cancer   of tablets/capsules you have at home, strength may be listed as something that might have changed.        Dose / Directions Last dose taken    predniSONE 20 MG tablet   Commonly known as:  DELTASONE   What changed:    - medication strength  - how much to take  - how to take this  - when to take this  - additional instructions   Quantity:  10 tablet        Take two tablets (= 40mg) each day for 5 (five) days   Refills:  0          Our records show that you are taking the medicines listed below. If these are incorrect, please call your family doctor or clinic.        Dose / Directions Last dose taken    LEVOFLOXACIN PO   Dose:  750 mg        Take 750 mg by mouth   Refills:  0        nitroGLYcerin 0.4 MG sublingual tablet   Commonly known as:  NITROSTAT   Dose:  0.4 mg        Place 0.4 mg under the tongue   Refills:  0        TRAMADOL HCL PO   Dose:  100 mg        Take 100 mg by mouth every 6 hours as needed for moderate to severe pain   Refills:  0                Prescriptions were sent or printed at these locations (3 Prescriptions)                   [facility] [address] [place] [id number]    Telephone:  [phone]   Fax:  [phone]   Hours:                  Printed at Department/Unit printer (3 of 3)         ranitidine (ZANTAC) 150 MG tablet               albuterol (PROAIR HFA/PROVENTIL HFA/VENTOLIN HFA) 108 (90 BASE) MCG/ACT Inhaler               predniSONE (DELTASONE) 20 MG tablet                Procedures and tests performed during your visit     Procedure/Test Number of Times Performed    Basic metabolic panel 1    CBC with platelets differential 1    Cardiac Continuous Monitoring 1    EKG 12 lead 2    Troponin I (now) 2    XR Chest 2 Views 1      Orders Needing Specimen Collection     None      Pending Results     Date and Time Order Name Status Description    4/9/2018 1158 EKG 12 lead In process     4/9/2018 1022 EKG 12 lead In  "process             Pending Culture Results     No orders found from 2018 to 4/10/2018.            Pending Results Instructions     If you had any lab results that were not finalized at the time of your Discharge, you can call the ED Lab Result RN at [phone]. You will be contacted by this team for any positive Lab results or changes in treatment. The nurses are available 7 days a week from 10A to 6:30P.  You can leave a message 24 hours per day and they will return your call.        Thank you for choosing [facility]       Thank you for choosing [facility] for your care. Our goal is always to provide you with excellent care. Hearing back from our patients is one way we can continue to improve our services. Please take a few minutes to complete the written survey that you may receive in the mail after you visit with us. Thank you!        [employer] Information     [employer] lets you send messages to your doctor, view your test results, renew your prescriptions, schedule appointments and more. To sign up, go to [email or web address] . Click on \"Log in\" on the left side of the screen, which will take you to the Welcome page. Then click on \"Sign up Now\" on the right side of the page.     You will be asked to enter the access code listed below, as well as some personal information. Please follow the directions to create your username and password.     Your access code is: [email or web address]  Expires: 2018  1:24 PM     Your access code will  in 90 days. If you need help or a new code, please call your [facility] clinic or [phone].        Care EveryWhere ID     This is your Care EveryWhere ID. This could be used by other organizations to access your [facility] medical records  [id number]        Equal Access to Services     SAMMY WAN : Hubert Schreiber, marquez mcgowan, trey barrett. So [facility] [phone].    ATENCIÓN: Si lawrence hdz " lucio disposición servicios gratuitos de asistencia lingüística. Lljudie al [phone].    We comply with applicable federal civil rights laws and [place] laws. We do not discriminate on the basis of race, color, national origin, age, disability, sex, sexual orientation, or gender identity.            After Visit Summary       This is your record. Keep this with you and show to your community pharmacist(s) and doctor(s) at your next visit.

## 2019-05-16 NOTE — ED PROVIDER NOTES
"  History     Chief Complaint:  Joint Swelling       HPI:  Tameka Andersen is a 89 year old female who presents with right wrist and thumb swelling.  For the past 3 days, patient has acknowledged increased pain, swelling, and redness, centered about the right thumb.  She denies underlying traumatic injuries.  She was seen yesterday evening in urgent care, where she was diagnosed with possible infection, and started on Keflex.  She has taken 2 doses thus far.  She does acknowledge a fever, measuring a temperature of 101 yesterday evening.  She has not taken any medications for her pain.  She does acknowledge a recent infection to her left foot, for which she was treated with Keflex, and resolved.  She has no previous history of gout.  She is on Coumadin for atrial fibrillation.  She also has a history of metastatic lung cancer, with no metastases to her adrenal glands, and scapula.  She is not currently undergoing chemotherapy.  She does have an upcoming meeting with hospice.    Allergies:  Thorazine [Chlorpromazine Hcl]     Medications:    Metoprolol   Coumadin     Past Medical History:    A-Fib  Anemia  Arthritis   Cancer    Past Surgical History:    Arthroplasty knee  Cholecystectomy   Hysterectomy  Lung surgery   Tonsillectomy      Family History:    Thrombosis in her mother.    Social History:  Patient reports that she has never smoked. She has never used smokeless tobacco. She reports that she drinks alcohol. She reports that she does not use drugs.    Review of Systems:  10 point ROS is negative aside from that mentioned in the HPI      Physical Exam     Patient Vitals for the past 24 hrs:   BP Temp Temp src Heart Rate Resp SpO2 Height Weight   05/16/19 1042 137/81 99.2  F (37.3  C) Temporal 97 (!) 48 92 % 1.41 m (4' 7.5\") 58.1 kg (128 lb)      General:   Well-nourished   Speaking in full sentences   Appears uncomfortable   Hoarse voice (known vocal cord dysfunction 2/2 metastatic lung " cancer)  Eyes:   Conjunctiva without injection or scleral icterus  ENT:   Moist mucous membranes   Nares patent   Pinnae normal  Neck:   Full ROM   No stiffness appreciated  Resp:   Lungs CTAB   No crackles, wheezing or audible rubs   Good air movement  CV:    Normal rate, regular rhythm   S1 and S2 present   No murmur, gallop or rub  GI:   BS present   Abdomen soft without distention   Non-tender to light and deep palpation   No guarding or rebound tenderness  Skin:   Warm, dry, well perfused   No rashes or open wounds on exposed skin  MSK:   RUE:   Pain, swelling, and erythema noted over R thumb and thenar eminence   Pain with attempt at opposition or flexion of the thumb     Neuro:   Alert   Answers questions appropriately   Moves all extremities equally   Gait stable  Psych:   Normal affect, normal mood        Emergency Department Course     Imaging:  Radiology findings were communicated with the patient who voiced understanding of the findings.  XR Hand Right G/E 3 Views   Final Result   IMPRESSION: Advanced degenerative changes are noted involving the   right first CMC joint. Diffuse osteopenia. Postoperative changes of   plate and screw fixation and pin placement are noted in the distal   left radius. Irregularity of the distal left ulna likely related to a   healed fracture. No convincing radiographic evidence for osteomyelitis   in the right thumb.      BEHZAD ABURTO MD        Laboratory:  Laboratory findings were communicated with the patient who voiced understanding of the findings.  Labs Ordered and Resulted from Time of ED Arrival Up to the Time of Departure from the ED   CBC WITH PLATELETS DIFFERENTIAL - Abnormal; Notable for the following components:       Result Value    RBC Count 3.68 (*)     Hemoglobin 11.6 (*)     Hematocrit 34.7 (*)     RDW 16.1 (*)     Platelet Count 101 (*)     Absolute Lymphocytes 0.4 (*)     Absolute Monocytes 3.8 (*)     All other components within normal limits    COMPREHENSIVE METABOLIC PANEL - Abnormal; Notable for the following components:    Glucose 147 (*)     GFR Estimate 53 (*)     Albumin 3.3 (*)     All other components within normal limits   INR - Abnormal; Notable for the following components:    INR 2.18 (*)     All other components within normal limits   PARTIAL THROMBOPLASTIN TIME - Abnormal; Notable for the following components:    PTT 43 (*)     All other components within normal limits   ERYTHROCYTE SEDIMENTATION RATE AUTO - Abnormal; Notable for the following components:    Sed Rate 37 (*)     All other components within normal limits   CRP INFLAMMATION - Abnormal; Notable for the following components:    CRP Inflammation 53.9 (*)     All other components within normal limits   LACTIC ACID WHOLE BLOOD   Lactic acid: 1.7       Interventions:  1125: Normal Saline, 0.5 liter, IV bolus   1125: Tylenol, 1000 mg, oral     Emergency Department Course:  Nursing notes and vitals reviewed.  10:52 AM: I performed an exam of the patient as documented above.      IV was inserted and blood was drawn for laboratory testing, results above.    The patient was sent for X-ray while in the emergency department, results above.      1252: I talked with MEIR Chairez at [facility].    1315:  Patient re-evaluated.    1332: I discussed the treatment plan with the patient.  They are in agreement with hospitalization at this time.  Case was discussed with MEIR Spence, who has graciously accepted the patient for admission under Dr. Valladares to an adult [facility] bed.  Questions were answered prior to transfer of care.    Impression & Plan      Medical Decision Making:  Tameka Andersen is a 89 year old female who presents to the ER for evaluation of right hand swelling.  Vital signs on presentation reveal elevated blood pressure and tachypnea though otherwise are unremarkable.  Examination is notable for swelling, erythema, and warmth localized to the right thumb, with maximal pain  at the CMC joint, as well as associated tenderness just proximal to the level of the wrist.  Patient exhibits limitations in range of motion at the CMC joint, particularly with opposition.  Differential diagnosis includes cellulitis versus underlying septic arthritis.  Patient was recently treated with oral antibiotics for a left lower extremity cellulitis, which may have led to hematogenous spread of her associated infection.  X-ray demonstrates arthritic changes at the CMC joint, though no other acute pathology.  Labs demonstrate normal WBC count, but elevated ESR and CRP.  Here in the ED, patient is afebrile, though acknowledges a fever yesterday evening.  Bedside ultrasound was utilized to evaluate the subcutaneous tissues and evaluate the joint space.  Given her severe arthritis, as well as ultrasound imaging, I did not feel comfortable performing arthrocentesis at this time.  Orthopedics was consulted, and have seen and evaluated the patient at bedside.  They have recommended IV antibiotics, n.p.o. status, and reevaluation in the morning with plan for possible operative intervention if patient fails to improve.  Her clinical course is complicated by known metastatic lung cancer, for which a palliative approach is currently being followed.  Patient additionally is on Coumadin for atrial fibrillation, which will be held this evening in anticipation of possible operative intervention.  Patient and daughter were updated at bedside.  Questions were answered prior to admission.    Diagnosis:    ICD-10-CM    1. Infection of thumb L08.9    2. Chronic anticoagulation Z79.01         5/16/2019   Mono Muir MD Roach, Brian Donald, MD  05/16/19 1838

## 2019-05-16 NOTE — PHARMACY-ADMISSION MEDICATION HISTORY
Admission medication history interview status for this patient is complete. See [facility] admission navigator for allergy information, prior to admission medications and immunization status.     Medication history interview source(s):Patient  Medication history resources (including written lists, pill bottles, clinic record): care everywhere/clinic record  Primary pharmacy:[facility]    Changes made to PTA medication list:  Added: keflex  Deleted: none  Changed: warfarin - updated with dose    Actions taken by pharmacist (provider contacted, etc): called [facility] RN to verify warfarin dosing. Updated in PTA med list.      Additional medication history information:None    Medication reconciliation/reorder completed by provider prior to medication history? Yes    Do you take OTC medications (eg tylenol, ibuprofen, fish oil, eye/ear drops, etc)? Y(Y/N)    For patients on insulin therapy: N (Y/N)    Prior to Admission medications    Medication Sig Last Dose Taking? Auth Provider   cephALEXin (KEFLEX) 500 MG capsule Take 500 mg by mouth 2 times daily For 10 days (5/15/19-5/25/19) 5/15/2019 at x2 Yes Unknown, Entered By History   cholecalciferol (VITAMIN D3) 5000 units TABS tablet Take 5,000 Units by mouth daily 5/15/2019 at Unknown time Yes Unknown, Entered By History   METOPROLOL TARTRATE PO Take 25 mg by mouth 2 times daily  5/15/2019 at x2 Yes Reported, Patient   Propylene Glycol-Glycerin (SOOTHE OP) Place 1 drop into both eyes 2 times daily  Past Month at Unknown time Yes Unknown, Entered By History   warfarin (COUMADIN) 5 MG tablet Take 5 mg by mouth every evening Except 2.5 mg on Tuesday/Thursday/Saturday 5/15/2019 at pm 5 mg Yes Unknown, Entered By History   ACETAMINOPHEN PO Take 1,000 mg by mouth every 6 hours as needed for pain Unknown at Unknown time  Reported, Patient

## 2019-05-17 LAB
ANION GAP SERPL CALCULATED.3IONS-SCNC: 6 MMOL/L (ref 3–14)
BACTERIA SPEC CULT: NORMAL
BUN SERPL-MCNC: 16 MG/DL (ref 7–30)
CALCIUM SERPL-MCNC: 8.6 MG/DL (ref 8.5–10.1)
CHLORIDE SERPL-SCNC: 106 MMOL/L (ref 94–109)
CO2 SERPL-SCNC: 25 MMOL/L (ref 20–32)
CREAT SERPL-MCNC: 0.82 MG/DL (ref 0.52–1.04)
CRP SERPL-MCNC: 109 MG/L (ref 0–8)
CRYSTALS SNV MICRO: NORMAL
ERYTHROCYTE [DISTWIDTH] IN BLOOD BY AUTOMATED COUNT: 16.1 % (ref 10–15)
ERYTHROCYTE [SEDIMENTATION RATE] IN BLOOD BY WESTERGREN METHOD: 39 MM/H (ref 0–30)
GFR SERPL CREATININE-BSD FRML MDRD: 61 ML/MIN/{1.73_M2}
GLUCOSE SERPL-MCNC: 103 MG/DL (ref 70–99)
GRAM STN SPEC: NORMAL
HCT VFR BLD AUTO: 31.4 % (ref 35–47)
HGB BLD-MCNC: 10.1 G/DL (ref 11.7–15.7)
INR PPP: 1.86 (ref 0.86–1.14)
INR PPP: 2.11 (ref 0.86–1.14)
MCH RBC QN AUTO: 30.6 PG (ref 26.5–33)
MCHC RBC AUTO-ENTMCNC: 32.2 G/DL (ref 31.5–36.5)
MCV RBC AUTO: 95 FL (ref 78–100)
PLATELET # BLD AUTO: 90 10E9/L (ref 150–450)
POTASSIUM SERPL-SCNC: 4 MMOL/L (ref 3.4–5.3)
RBC # BLD AUTO: 3.3 10E12/L (ref 3.8–5.2)
SODIUM SERPL-SCNC: 138 MMOL/L (ref 133–144)
SPECIMEN SOURCE FLD: NORMAL
SPECIMEN SOURCE SNV: NORMAL
SPECIMEN SOURCE: NORMAL
SPECIMEN SOURCE: NORMAL
WBC # BLD AUTO: 7.7 10E9/L (ref 4–11)

## 2019-05-17 PROCEDURE — 25000125 ZZHC RX 250: Performed by: INTERNAL MEDICINE

## 2019-05-17 PROCEDURE — 25000128 H RX IP 250 OP 636: Performed by: INTERNAL MEDICINE

## 2019-05-17 PROCEDURE — 87205 SMEAR GRAM STAIN: CPT | Performed by: PHYSICIAN ASSISTANT

## 2019-05-17 PROCEDURE — 99232 SBSQ HOSP IP/OBS MODERATE 35: CPT | Performed by: INTERNAL MEDICINE

## 2019-05-17 PROCEDURE — 25000132 ZZH RX MED GY IP 250 OP 250 PS 637: Performed by: INTERNAL MEDICINE

## 2019-05-17 PROCEDURE — 40000895 ZZH STATISTIC SLP IP EVAL DEFER

## 2019-05-17 PROCEDURE — A9270 NON-COVERED ITEM OR SERVICE: HCPCS | Performed by: INTERNAL MEDICINE

## 2019-05-17 PROCEDURE — 80048 BASIC METABOLIC PNL TOTAL CA: CPT | Performed by: INTERNAL MEDICINE

## 2019-05-17 PROCEDURE — 85652 RBC SED RATE AUTOMATED: CPT | Performed by: PHYSICIAN ASSISTANT

## 2019-05-17 PROCEDURE — 86140 C-REACTIVE PROTEIN: CPT | Performed by: PHYSICIAN ASSISTANT

## 2019-05-17 PROCEDURE — 12000011 ZZH R&B MS OVERFLOW

## 2019-05-17 PROCEDURE — 85027 COMPLETE CBC AUTOMATED: CPT | Performed by: INTERNAL MEDICINE

## 2019-05-17 PROCEDURE — 25000132 ZZH RX MED GY IP 250 OP 250 PS 637: Performed by: PHYSICIAN ASSISTANT

## 2019-05-17 PROCEDURE — 87070 CULTURE OTHR SPECIMN AEROBIC: CPT | Performed by: PHYSICIAN ASSISTANT

## 2019-05-17 PROCEDURE — 0R9N3ZZ DRAINAGE OF RIGHT WRIST JOINT, PERCUTANEOUS APPROACH: ICD-10-PCS | Performed by: ORTHOPAEDIC SURGERY

## 2019-05-17 PROCEDURE — 36415 COLL VENOUS BLD VENIPUNCTURE: CPT | Performed by: PHYSICIAN ASSISTANT

## 2019-05-17 PROCEDURE — 85610 PROTHROMBIN TIME: CPT | Performed by: INTERNAL MEDICINE

## 2019-05-17 PROCEDURE — 36415 COLL VENOUS BLD VENIPUNCTURE: CPT | Performed by: INTERNAL MEDICINE

## 2019-05-17 PROCEDURE — 40000141 ZZH STATISTIC PERIPHERAL IV START W/O US GUIDANCE

## 2019-05-17 PROCEDURE — A9270 NON-COVERED ITEM OR SERVICE: HCPCS | Performed by: PHYSICIAN ASSISTANT

## 2019-05-17 RX ORDER — INDOMETHACIN 25 MG/1
25 CAPSULE ORAL
Status: COMPLETED | OUTPATIENT
Start: 2019-05-17 | End: 2019-05-18

## 2019-05-17 RX ADMIN — ACETAMINOPHEN 650 MG: 325 TABLET, FILM COATED ORAL at 13:35

## 2019-05-17 RX ADMIN — INDOMETHACIN 25 MG: 25 CAPSULE ORAL at 17:24

## 2019-05-17 RX ADMIN — CEFAZOLIN SODIUM 1 G: 1 INJECTION, SOLUTION INTRAVENOUS at 05:49

## 2019-05-17 RX ADMIN — INDOMETHACIN 25 MG: 25 CAPSULE ORAL at 13:31

## 2019-05-17 RX ADMIN — Medication 5 MG: at 17:24

## 2019-05-17 RX ADMIN — CEFAZOLIN SODIUM 1 G: 1 INJECTION, SOLUTION INTRAVENOUS at 13:32

## 2019-05-17 RX ADMIN — PHYTONADIONE 5 MG: 10 INJECTION, EMULSION INTRAMUSCULAR; INTRAVENOUS; SUBCUTANEOUS at 09:29

## 2019-05-17 RX ADMIN — DOCUSATE SODIUM 100 MG: 100 CAPSULE, LIQUID FILLED ORAL at 19:40

## 2019-05-17 RX ADMIN — ACETAMINOPHEN 650 MG: 325 TABLET, FILM COATED ORAL at 08:52

## 2019-05-17 RX ADMIN — METOPROLOL TARTRATE 25 MG: 25 TABLET ORAL at 19:39

## 2019-05-17 RX ADMIN — METOPROLOL TARTRATE 25 MG: 25 TABLET ORAL at 08:52

## 2019-05-17 RX ADMIN — CEFAZOLIN SODIUM 1 G: 1 INJECTION, SOLUTION INTRAVENOUS at 21:54

## 2019-05-17 NOTE — PLAN OF CARE
PRIMARY DIAGNOSIS: RIGHT WRIST CELLULITIS  OUTPATIENT/OBSERVATION GOALS TO BE MET BEFORE DISCHARGE:  1. ADLs back to baseline: Yes    2. Activity and level of assistance: Up with standby assistance.    3. Pain status: Pain free.    4. Return to near baseline physical activity: Yes     Discharge Planner Nurse   Safe discharge environment identified: Yes  Barriers to discharge: Yes, ortho consult in AM.    No acute events overnight. Pt denied pain. Right wrist outlined redness.Pt states it has not gotten worse. VSS. CMS Intact on right wrist.         Entered by: Dale Jay 05/17/2019 1:27 AM     Please review provider order for any additional goals.   Nurse to notify provider when observation goals have been met and patient is ready for discharge.

## 2019-05-17 NOTE — PROGRESS NOTES
Attempted bedside aspiration of the right wrist with Dr. Buckley and send for culture, crystal, cell count with differential, gram stain  Very low amount of fluid returned - will attempt to see if lab able to run any tests on this    Continue Vitamin K/INR reversal to 1.5 - surgery may be indicated pending results of labs/improvement  If not improvement seen in following days may consider further imaging/formal asp by IR    May have diet  Will treat empirically for gout as well  Continue to elevate  Ice  Pain medication as needed    Carol Mayer PAC

## 2019-05-17 NOTE — PLAN OF CARE
"Pt is alert and oriented x4. She states she only has pain in her wrist when she moves it around. Tylenol offered - pt denied. SBA. She has SOB @ baseline. Complains of cough - robitussin given. R wrist is swollen and red. SL. Receiving ancef. Regular diet, NPO @ midnight. Ortho consult.     /58 (BP Location: Left arm)   Pulse 98   Temp 96.5  F (35.8  C) (Oral)   Resp 20   Ht 1.41 m (4' 7.5\")   Wt 59.2 kg (130 lb 8 oz)   SpO2 92%   BMI 29.79 kg/m      "

## 2019-05-17 NOTE — PLAN OF CARE
PRIMARY DIAGNOSIS: Cellulitis vs septic joint  GOALS TO BE MET BEFORE DISCHARGE:  Vitals sign stable or return to baseline: Yes    Tolerating oral antibiotics or has home infusion set up if applicable: Yes    Pain status: Improved-controlled with oral pain medications.    Return to near baseline physical activity: No    Discharge Planner Nurse   Safe discharge environment identified: Yes  Barriers to discharge: Yes       Entered by: Yvette Crump 05/17/2019 2:38 PM     Please review provider order for any additional goals.     Nurse to notify provider when observation goals have been met and patient is ready for discharge.    Pt is alert and oriented. Pain managed with ice and Tylenol. Swelling and redness is worse than yesterday per patient report. Ortho was able to pull only a small amount of fluid from the wrist, but it was not a big enough sample to test. Vitamin K given reverse INR. Pt will be NPO at midnight.

## 2019-05-17 NOTE — PLAN OF CARE
Pt is A&O- lungs clear, pt is SOB, 2L NC applied. R wrist/ thumb cellulitis marked. On Q8H ancef. Denied having any pain. CMS intact. Pt is up with 1 assist.

## 2019-05-17 NOTE — PROGRESS NOTES
[facility]  Hospitalist Progress Note  Katie Valladares MD 05/17/19  Text Page  Pager: [phone] (7am-6pm)    Reason for Stay (Diagnosis): Cellulitis Right Thumb/wrist         Assessment and Plan:      Summary of Stay: Tameka Andersen is a 89 year old female with PMH including chronic atrial fibrillation (on Coumadin), lung cancer (s/p wedge resection without further treatment), thoracic compression fracture (1/2019) who resides in independent living at [facility] who was admitted on 5/16/2019 with progressive warmth, erythema, pain and swelling of the right thumb and wrist consistent with cellulitis and possible joint infection.    Problem List/Assessment and Plan:     1.  Right thumb/wrist cellulitis: Patient developed pain, swelling and erythema in her right thumb and wrist approximately 3 days PTA.  She was seen in urgent care and took 2 doses of Keflex but her symptoms progressed.  Despite taking 2 doses of Keflex her symptoms progressed.  She reports fever at home to 101, afebrile here.  She has no leukocytosis but has elevated inflammatory markers (higher today).    Orthopedics did attempt joint aspiration today but did not obtain enough fluid to run in analysis.  Discussed with orthopedics and plan is to reverse INR in case she will require a surgical procedure but for today continue IV antibiotics.  Indomethacin has also been added on in case this represents gout.  -Continue IV Ancef  -Orthopedics consulted, appreciate recommendations  -Hold Coumadin, n.p.o. at midnight for possible surgical procedure  -Oral vitamin K received this morning, repeat INR this afternoon and re-dose vitamin K if INR still elevated  -Follow-up blood cultures  -Pain control with Tylenol (patient states she does not want stronger pain medications,  She has had dry mouth and constipation with oxycodone)  -Indomethacin 25 mg 3 times daily started today     2.  Metastatic lung cancer: Patient had prior wedge resection  and shows not to treat this further.  She states that she has metastases to her vocal cord, lymph nodes, adrenal gland and scapula.  She does have chronic shortness of breath and cough related to this but it has been unchanged over the past several months.  -Speech therapy consult for hoarse voice related to vocal cord issues from her cancer     3.  Chronic atrial fibrillation: Patient is rate controlled with metoprolol.  Prior to admission she was on Coumadin.  Her INR is therapeutic at this time.  Will hold Coumadin per orthopedic recommendations in case she does need surgical procedure.  As above vitamin K ordered this morning.  -Hold Coumadin  -Continue metoprolol with hold parameters  -Oral vitamin K with repeat INR this afternoon     4.  Weakness: Normally patient ambulates without an assistive device.  She resides at the [facility].  She does feel weaker than normal due to not feeling ill from her cellulitis.    Diet: Regular Diet Adult    DVT Prophylaxis: Pneumatic Compression Devices, Coumadin on hold  Scruggs Catheter: not present  Code Status: DNR/DNI      Disposition Plan   Expected discharge: 2 - 3 days, recommended to prior living arrangement once antibiotic plan established.  Entered: Katie Valladares MD 05/17/2019, 9:46 AM       The patient's care was discussed with the Bedside Nurse, Care Coordinator/, Patient, Patient's Family and Orthopedic Consultant.    Katie Valladares MD  Hospitalist Service  [facility]          Interval History (Subjective):      Patient was seen with her niece this morning.  She states her right wrist is more painful and swollen compared to yesterday.  She denies CP.  Feels slightly SOB with her chronic cough.  No nausea, emesis.  No tingling or numbness in her right hand.                  Physical Exam:      Last Vital Signs:  /74 (BP Location: Left arm)   Pulse 95   Temp 98.1  F (36.7  C) (Oral)   Resp 20   Ht 1.41 m (4'  "7.5\")   Wt 59.2 kg (130 lb 8 oz)   SpO2 94%   BMI 29.79 kg/m      General: Alert, awake, no acute distress.  HEENT: Normocephalic and atraumatic, eyes anicteric and without scleral injection, EOMI, face symmetric, MMM.  Cardiac: RRR, normal S1, S2. No m/g/r, no LE edema.  Pulmonary: Normal chest rise, normal work of breathing. Decreased breath sounds in the right lung base but no crackles or wheezing.  Abdomen: soft, non-tender, non-distended.  Normoactive bowel sounds, no guarding or rebound tenderness.  Extremities: Right wrist/thumb diffusely swollen and tender, slightly less erythematous today compared to yesterday.  Warm, well perfused.  Skin: Erythema/swelling of the right wrist/thumb as above.  Warm and Dry.  Neuro: No focal deficits.  Speech clear.  Coordination and strength grossly normal.  Psych: Alert and oriented x3. Appropriate affect.         Medications:      All current medications were reviewed with changes reflected in problem list.         Data:      All new lab and imaging data was reviewed.   Labs:  Recent Labs   Lab 05/17/19  0623 05/16/19  1117    136   POTASSIUM 4.0 4.1   CHLORIDE 106 104   CO2 25 27   ANIONGAP 6 5   * 147*   BUN 16 27   CR 0.82 0.91   GFRESTIMATED 61 53*   GFRESTBLACK 71 62   NADIA 8.6 9.0     Recent Labs   Lab 05/17/19  0623 05/16/19  1117   WBC 7.7 10.1   HGB 10.1* 11.6*   HCT 31.4* 34.7*   MCV 95 94   PLT 90* 101*     Recent Labs   Lab 05/17/19  0853 05/16/19  1117   SED 39* 37*   .0* 53.9*      Imaging:   Recent Results (from the past 24 hour(s))   XR Hand Right G/E 3 Views    Narrative    RIGHT HAND THREE VIEWS May 16, 2019 11:44 AM     HISTORY: Right thumb pain.    COMPARISON: None.      Impression    IMPRESSION: Advanced degenerative changes are noted involving the  right first CMC joint. Diffuse osteopenia. Postoperative changes of  plate and screw fixation and pin placement are noted in the distal  left radius. Irregularity of the distal left " ulna likely related to a  healed fracture. No convincing radiographic evidence for osteomyelitis  in the right thumb.    MD Katie MATA MD.

## 2019-05-17 NOTE — PLAN OF CARE
"Discharge Planner SLP   Patient plan for discharge: plan for eventual hospice.   Current status: SLP orders received for \"vocal for dysfunction\" \"difficulty breathing at times related to lung cancer\" and per hospitalist note hoarse quality as a result of her CA. 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 There was occasionally good closure of the glottis and Tameka was stimulable for improved voice quality and is a good candidate for behavioral speech therapy.\" Pt already has an established SLP plan as OP with next scheduled appointment on 5/23/19. Pt reports her voice is stable and it is not an acute change. Given same, evaluation or treatment was not completed and recommend follow up at scheduled appointment per established POC with voice specialized SLP. Pt and family in room verbalize understanding. Will complete orders.   Barriers to return to prior living situation: no SLP barriesr  Recommendations for discharge: continue OP SLP (scheduled appointment on 5/23/19)  Rationale for recommendations: will \"complete\" orders, see above.        Entered by: Zofia Narayanan 05/17/2019 12:38 PM     "

## 2019-05-18 VITALS
OXYGEN SATURATION: 94 % | HEART RATE: 89 BPM | BODY MASS INDEX: 29.35 KG/M2 | RESPIRATION RATE: 24 BRPM | DIASTOLIC BLOOD PRESSURE: 65 MMHG | SYSTOLIC BLOOD PRESSURE: 127 MMHG | TEMPERATURE: 96 F | WEIGHT: 130.5 LBS | HEIGHT: 56 IN

## 2019-05-18 LAB
ANION GAP SERPL CALCULATED.3IONS-SCNC: 6 MMOL/L (ref 3–14)
BUN SERPL-MCNC: 27 MG/DL (ref 7–30)
CALCIUM SERPL-MCNC: 8.7 MG/DL (ref 8.5–10.1)
CHLORIDE SERPL-SCNC: 106 MMOL/L (ref 94–109)
CO2 SERPL-SCNC: 26 MMOL/L (ref 20–32)
CREAT SERPL-MCNC: 1 MG/DL (ref 0.52–1.04)
CRP SERPL-MCNC: 95.7 MG/L (ref 0–8)
ERYTHROCYTE [DISTWIDTH] IN BLOOD BY AUTOMATED COUNT: 15.9 % (ref 10–15)
GFR SERPL CREATININE-BSD FRML MDRD: 48 ML/MIN/{1.73_M2}
GLUCOSE SERPL-MCNC: 88 MG/DL (ref 70–99)
HCT VFR BLD AUTO: 33 % (ref 35–47)
HGB BLD-MCNC: 10.4 G/DL (ref 11.7–15.7)
INR PPP: 1.49 (ref 0.86–1.14)
MCH RBC QN AUTO: 30.6 PG (ref 26.5–33)
MCHC RBC AUTO-ENTMCNC: 31.5 G/DL (ref 31.5–36.5)
MCV RBC AUTO: 97 FL (ref 78–100)
PLATELET # BLD AUTO: 88 10E9/L (ref 150–450)
POTASSIUM SERPL-SCNC: 4.3 MMOL/L (ref 3.4–5.3)
RBC # BLD AUTO: 3.4 10E12/L (ref 3.8–5.2)
SODIUM SERPL-SCNC: 138 MMOL/L (ref 133–144)
WBC # BLD AUTO: 5.3 10E9/L (ref 4–11)

## 2019-05-18 PROCEDURE — 99239 HOSP IP/OBS DSCHRG MGMT >30: CPT | Performed by: INTERNAL MEDICINE

## 2019-05-18 PROCEDURE — 80048 BASIC METABOLIC PNL TOTAL CA: CPT | Performed by: INTERNAL MEDICINE

## 2019-05-18 PROCEDURE — 25000128 H RX IP 250 OP 636: Performed by: INTERNAL MEDICINE

## 2019-05-18 PROCEDURE — 85027 COMPLETE CBC AUTOMATED: CPT | Performed by: INTERNAL MEDICINE

## 2019-05-18 PROCEDURE — 25000132 ZZH RX MED GY IP 250 OP 250 PS 637: Performed by: INTERNAL MEDICINE

## 2019-05-18 PROCEDURE — A9270 NON-COVERED ITEM OR SERVICE: HCPCS | Performed by: INTERNAL MEDICINE

## 2019-05-18 PROCEDURE — 36415 COLL VENOUS BLD VENIPUNCTURE: CPT | Performed by: INTERNAL MEDICINE

## 2019-05-18 PROCEDURE — 86140 C-REACTIVE PROTEIN: CPT | Performed by: INTERNAL MEDICINE

## 2019-05-18 PROCEDURE — 25000132 ZZH RX MED GY IP 250 OP 250 PS 637: Performed by: PHYSICIAN ASSISTANT

## 2019-05-18 PROCEDURE — A9270 NON-COVERED ITEM OR SERVICE: HCPCS | Performed by: PHYSICIAN ASSISTANT

## 2019-05-18 PROCEDURE — 85610 PROTHROMBIN TIME: CPT | Performed by: INTERNAL MEDICINE

## 2019-05-18 RX ORDER — COLCHICINE 0.6 MG/1
0.6 TABLET ORAL DAILY
Qty: 7 TABLET | Refills: 0 | Status: SHIPPED | OUTPATIENT
Start: 2019-05-18 | End: 2019-05-18

## 2019-05-18 RX ORDER — COLCHICINE 0.6 MG/1
0.6 TABLET ORAL DAILY
Qty: 7 TABLET | Refills: 0 | Status: SHIPPED | OUTPATIENT
Start: 2019-05-18

## 2019-05-18 RX ADMIN — INDOMETHACIN 25 MG: 25 CAPSULE ORAL at 09:24

## 2019-05-18 RX ADMIN — CEFAZOLIN SODIUM 1 G: 1 INJECTION, SOLUTION INTRAVENOUS at 05:47

## 2019-05-18 RX ADMIN — INDOMETHACIN 25 MG: 25 CAPSULE ORAL at 13:44

## 2019-05-18 RX ADMIN — CEFAZOLIN SODIUM 1 G: 1 INJECTION, SOLUTION INTRAVENOUS at 13:44

## 2019-05-18 RX ADMIN — DOCUSATE SODIUM 100 MG: 100 CAPSULE, LIQUID FILLED ORAL at 09:23

## 2019-05-18 NOTE — PLAN OF CARE
Patient's After Visit Summary was reviewed with patient and/or family.   Patient verbalized understanding of After Visit Summary, recommended follow up and was given an opportunity to ask questions.   Discharge medications sent home with patient/family: No, colchicine e-scribed  Discharged with other:family member       OBSERVATION patient END time: 4:05  VSS. Wheelchair ride provided

## 2019-05-18 NOTE — PLAN OF CARE
Patient alert and oriented x4. Vitals are Temp: 96.5  F (35.8  C) Temp src: Oral BP: 136/65 Pulse: 59 Heart Rate: 65 Resp: 18 SpO2: 98 %. Denies pain. Right hand red and swollen, CMS intact and patient reports improved hand function. Continuing with IV Ancef. Cardiac WDL. Patient reports SOB, on 2 LPM NC. Ortho following for further plans.

## 2019-05-18 NOTE — PLAN OF CARE
"PRIMARY DIAGNOSIS: R Wrist Joint cellulitis   OUTPATIENT/OBSERVATION GOALS TO BE MET BEFORE DISCHARGE:  1. ADLs back to baseline: Yes    2. Activity and level of assistance: Up with standby assistance.    3. Pain status: Pain free.    4. Return to near baseline physical activity: Yes     Discharge Planner Nurse   Safe discharge environment identified: Yes  Barriers to discharge: Yes       Entered by: Sushil Bynum 05/17/2019     Pt alert and oriented x4. She denies pain in wrist, does not want any tylenol. Wrist still red and swollen, does not look improved. Encouraging icing and elevation. Receiving IV ancef. Given another dose of vitamin K. SL. SBA in room, reports some weakness. Has been having SOB (has SOB @ baseline) 2L O2 on for comfort. Ortho consult. VSS.    /66 (BP Location: Left arm)   Pulse 59   Temp 96.6  F (35.9  C) (Oral)   Resp 18   Ht 1.41 m (4' 7.5\")   Wt 59.2 kg (130 lb 8 oz)   SpO2 97%   BMI 29.79 kg/m           Please review provider order for any additional goals.   Nurse to notify provider when observation goals have been met and patient is ready for discharge.  "

## 2019-05-18 NOTE — PROGRESS NOTES
Orthopedic Surgery  Tameka Andersen  2019  Admit Date:  2019  Right wrist pain     Patient resting comfortably in bed.    Pain and ROM are markedly improved.   Tolerating oral intake.    Denies nausea or vomiting.  Denies chest pain or shortness of breath unchanged from her chronic SOB and cough.    Alert and orient to person, place, and time.  Vital Sign Ranges  Temperature Temp  Av.3  F (35.7  C)  Min: 95.5  F (35.3  C)  Max: 96.6  F (35.9  C)   Blood pressure Systolic (24hrs), Av , Min:109 , Max:148        Diastolic (24hrs), Av, Min:64, Max:77      Pulse Pulse  Av  Min: 59  Max: 89   Respirations Resp  Av.7  Min: 16  Max: 20   Pulse oximetry SpO2  Av %  Min: 95 %  Max: 100 %       Examination of RUE demonstrates resolving erythema that is receding from demarcation. Faint erythema with small amount of swelling to the dorsal aspect of hand. Full active ROM at the wrist without pain. Neurovascularly intact distally. Radial pulse 2+.     Labs:  Recent Labs   Lab Test 19  0600 19  0623 19  1117   POTASSIUM 4.3 4.0 4.1     Recent Labs   Lab Test 19  0600 19  0623 19  1117   HGB 10.4* 10.1* 11.6*     Recent Labs   Lab Test 19  0600 19  1611 19  0623   INR 1.49* 1.86* 2.11*     Recent Labs   Lab Test 19  0600 19  0623 19  1117   PLT 88* 90* 101*       A/P  1. Plan   Continue IV antibiotics with transition to oral on discharge - per primary team   Activity as tolerated to RUE     2. Disposition   Anticipate d/c to home pending medical clearance - okay for discharge from an orthopedic standpoint.      Ayala Bean PA-C

## 2019-05-18 NOTE — PLAN OF CARE
"/75 (BP Location: Left arm)   Pulse 89   Temp 95.5  F (35.3  C) (Oral)   Resp 16   Ht 1.41 m (4' 7.5\")   Wt 59.2 kg (130 lb 8 oz)   SpO2 100%   BMI 29.79 kg/m    Neuro: WNL  Cardiac: Hx of A-fib (on coumadin)  Lungs: Hx:lung cancer-s/p RLL lobectomy 1/2019-frequent congested cough-non productive  GI: WNL  : WNL  Pain: Denies  IV: SL  Meds: IV ancef q 8  Labs/tests: INR-1.49  Diet: Regular  Activity: SBA  Misc: Cellulitis on R hand within marked borders-some erythema, some swelling, per pt. It is much better. Radial pulse WNL-encouraged elevation of R extremity. Pt. On 2L O2 for comfort.   Plan: Awaiting ortho to see pt, then probable discharge after.   "

## 2019-05-18 NOTE — DISCHARGE INSTRUCTIONS
1.  You will continue the Keflex that you are prescribed at urgent care prior to your hospital admission.  This will treat the infection of your right hand.  2.  It is possible that you have gout in the right wrist.  You will take colchicine 1 tablet daily to help with inflammation.  You will take this for 1 week.  3.  We gave you vitamin K to reverse your Coumadin in case you needed a surgical procedure.  You did not require a surgical procedure in your Coumadin is being resumed.  You should take Coumadin 5 mg each evening on Saturday and Sunday.  You need to have your INR rechecked on Monday with further dosage adjustment of Coumadin based on your INR result.

## 2019-05-18 NOTE — DISCHARGE SUMMARY
[facility]  Discharge Summary  Name: Tameka Andersen    MRN: [id number]  YOB: 1924    Age: 89 year old  Date of Discharge:  5/18/2019  Date of Admission: 5/16/2019  Primary Care Provider: Markie Becker  Discharge Physician:  Katie Valladares MD  Discharging Service:  Hospitalist      Discharge Diagnoses:  1.  Right thumb/wrist cellulitis  2.  Possible acute gout of the right wrist  3.  Metastatic lung cancer  4.  Chronic atrial fibrillation on Coumadin  5.  Weakness     Follow-ups Needed After Discharge   Repeat INR on Monday (5/20) and follow-up with primary care within 7 days    Unresulted Labs Ordered in the Past 30 Days of this Admission     No orders found from 10/16/2018 to 12/16/2018.        Hospital Course:  Tameka Andersen is a 89 year old female with PMH including chronic atrial fibrillation (on Coumadin), lung cancer (s/p wedge resection without further treatment), thoracic compression fracture (1/2019) who resides in independent living at [facility] who was admitted on 5/16/2019 with progressive warmth, erythema, pain and swelling of the right thumb and wrist consistent with cellulitis and possible joint infection. She dramatically improved with IV antibiotics as well as Indomethacin for possible gout.     1.  Right thumb/wrist cellulitis: Patient developed pain, swelling and erythema in her right thumb and wrist approximately 3 days PTA.  She was seen in urgent care and took 2 doses of Keflex but her symptoms progressed. She reports fever at home to 101, afebrile here.  She has no leukocytosis but has elevated inflammatory markers.    Orthopedics did attempt joint aspiration today but did not obtain enough fluid to run in analysis.    Her Coumadin was held and INR was reversed with vitamin K in case she would require surgical intervention.    Orthopedics followed throughout admission.  She was started on indomethacin in case this represented gout.  By the day of discharge the  "erythema, pain and swelling had dramatically improved.  She will complete her course of Keflex that she received from her urgent care prior to admission.  I will be resuming her Coumadin so instead of indomethacin she will be prescribed colchicine upon discharge for 7 days.       2.  Metastatic lung cancer: Patient had prior wedge resection and shows not to treat this further.  She states that she has metastases to her vocal cord, lymph nodes, adrenal gland and scapula.  She does have chronic shortness of breath and cough related to this but it has been unchanged over the past several months.     3.  Chronic atrial fibrillation: Patient is rate controlled with metoprolol.  Prior to admission she was on Coumadin.  Coumadin was held in case she was to require surgical intervention.  She did receive vitamin K but ultimately did not require surgical intervention.  Her Coumadin will be resumed.  She will take 5 mg daily with a recheck of her INR on Monday with further dose adjustment based on INR result.  Her metoprolol was continued.     4.  Weakness: Normally patient ambulates without an assistive device.  She resides at the [facility].  She felt back to her baseline status on day of discharge.     Discharge Disposition:  Discharged to assisted living     Allergies:  Allergies   Allergen Reactions     Thorazine [Chlorpromazine Hcl]         Condition on Discharge:  Discharge condition: Good   Discharge vitals: Blood pressure 137/75, pulse 89, temperature 95.5  F (35.3  C), temperature source Oral, resp. rate 16, height 1.41 m (4' 7.5\"), weight 59.2 kg (130 lb 8 oz), SpO2 100 %.   Code status on discharge: DNR / DNI     History of Illness:  See detailed admission note for full details.    Physical Exam:  Blood pressure 137/75, pulse 89, temperature 95.5  F (35.3  C), temperature source Oral, resp. rate 16, height 1.41 m (4' 7.5\"), weight 59.2 kg (130 lb 8 oz), SpO2 100 %.  Wt Readings from Last 1 " Encounters:   05/16/19 59.2 kg (130 lb 8 oz)     General: Alert, awake, no acute distress.  HEENT: Normocephalic and atraumatic, eyes anicteric and without scleral injection, EOMI, face symmetric, MMM.  Cardiac: RRR, normal S1, S2. No m/g/r, no LE edema.  Pulmonary: Normal chest rise, normal work of breathing. Decreased breath sounds in the right lung base but no crackles or wheezing.  Abdomen: soft, non-tender, non-distended.  Normoactive bowel sounds, no guarding or rebound tenderness.  Extremities: Right wrist/thumb swelling has dramatically decreased compared to yesterday, no longer swelling in the right fingers. Erythema almost completely resolved.  Warm, well perfused.  Skin: Improved erythema of right arm.  Warm and Dry.  Neuro: No focal deficits.  Speech clear.  Coordination and strength grossly normal.  Psych: Alert and oriented x3. Appropriate affect.    Procedures other than Imaging:  Attempted right wrist aspiration (not enough fluid was obtained for analysis)     Imaging:  Results for orders placed or performed during the hospital encounter of 05/16/19   XR Hand Right G/E 3 Views    Narrative    RIGHT HAND THREE VIEWS May 16, 2019 11:44 AM     HISTORY: Right thumb pain.    COMPARISON: None.      Impression    IMPRESSION: Advanced degenerative changes are noted involving the  right first CMC joint. Diffuse osteopenia. Postoperative changes of  plate and screw fixation and pin placement are noted in the distal  left radius. Irregularity of the distal left ulna likely related to a  healed fracture. No convincing radiographic evidence for osteomyelitis  in the right thumb.    BEHZAD ABURTO MD        Consultations:  Consultations This Hospital Stay   ORTHOPEDIC SURGERY IP CONSULT  CARE COORDINATOR IP CONSULT  SPEECH LANGUAGE PATH ADULT IP CONSULT     Recent Lab Results:  Recent Labs   Lab 05/18/19  0600 05/17/19  0623 05/16/19  1117   WBC 5.3 7.7 10.1   HGB 10.4* 10.1* 11.6*   HCT 33.0* 31.4* 34.7*   MCV  97 95 94   PLT 88* 90* 101*     Recent Labs   Lab 05/18/19  0600 05/17/19  0623 05/16/19  1117    138 136   POTASSIUM 4.3 4.0 4.1   CHLORIDE 106 106 104   CO2 26 25 27   ANIONGAP 6 6 5   GLC 88 103* 147*   BUN 27 16 27   CR 1.00 0.82 0.91   GFRESTIMATED 48* 61 53*   GFRESTBLACK 55* 71 62   NADIA 8.7 8.6 9.0     Recent Labs   Lab 05/18/19  0600 05/17/19  0853 05/16/19  1117   SED  --  39* 37*   CRP 95.7* 109.0* 53.9*          Pending Results:    Unresulted Labs Ordered in the Past 30 Days of this Admission     Date and Time Order Name Status Description    5/16/2019 1132 Blood culture Preliminary     5/16/2019 1100 Blood culture Preliminary            Discharge Instructions and Follow-Up:   Discharge Orders      Home care nursing referral      Home Care PT Referral for Hospital Discharge      Home Care OT Referral for Hospital Discharge      Home Care Social Service Referral for Hospital Discharge      Home Care SLP Referral for Hospital Discharge      Reason for your hospital stay    You were hospitalized for swelling, redness and pain in your right hand and wrist.  This was consistent with infection.  It is also possible that you had gout in your hand.  You will complete antibiotics as well as a medication to reduce inflammation for possible gout.     Follow-up and recommended labs and tests     You will need to have your INR checked on Monday (5/20/2019).    Follow up with your primary care doctor within 7 days to ensure that your hand continues to improve.     Activity    Your activity upon discharge: activity as tolerated     Discharge Instructions    You should take Coumadin 5 mg each night on Saturday and Sunday.  You need to have your INR checked on Monday and you will be instructed what Coumadin dose to take at that time.     MD face to face encounter    Documentation of Face to Face and Certification for Home Health Services    I certify that patient: Tameka Andersen is under my care and that I, or a  nurse practitioner or physician's assistant working with me, had a face-to-face encounter that meets the physician face-to-face encounter requirements with this patient on: 5/18/2019.    This encounter with the patient was in whole, or in part, for the following medical condition, which is the primary reason for home health care: metastatic lung cancer.    I certify that, based on my findings, the following services are medically necessary home health services: Nursing, Occupational Therapy, Physical Therapy and Speech Language Therapy.    My clinical findings support the need for the above services because: Nurse is needed: To assess hand infection after changes in medications or other medical regimen.., Occupational Therapy Services are needed to assess and treat cognitive ability and address ADL safety due to impairment in mobility., Physical Therapy Services are needed to assess and treat the following functional impairments: mobility. and Speech Therapy Services are needed to assess and treat impairments in language and/or swallow functions due to hoarse voice/vocal cord dysfunction.    Further, I certify that my clinical findings support that this patient is homebound (i.e. absences from home require considerable and taxing effort and are for medical reasons or [employer] services or infrequently or of short duration when for other reasons) because: Leaving home is medically contraindicated for the following reason(s): Dyspnea on exertion that makes it so they cannot leave their home for needed services without clinical deterioration...    Based on the above findings. I certify that this patient is confined to the home and needs intermittent skilled nursing care, physical therapy and/or speech therapy.  The patient is under my care, and I have initiated the establishment of the plan of care.  This patient will be followed by a physician who will periodically review the plan of care.  Physician/Provider to  provide follow up care: Markie Becker    Attending [facility] physician (the Medicare certified [facility] provider): Katie Valladares  Physician Signature: See electronic signature associated with these discharge orders.  Date: 5/18/2019     DNR/DNI     Diet    Follow this diet upon discharge: Orders Placed This Encounter      Regular Diet Adult     Discharge Medications   Discharge Medication List as of 5/18/2019  3:35 PM      CONTINUE these medications which have CHANGED    Details   colchicine (COLCYRS) 0.6 MG tablet Take 1 tablet (0.6 mg) by mouth daily, Disp-7 tablet, R-0, E-Prescribe         CONTINUE these medications which have NOT CHANGED    Details   cephALEXin (KEFLEX) 500 MG capsule Take 500 mg by mouth 2 times daily For 10 days (5/15/19-5/25/19), Historical      cholecalciferol (VITAMIN D3) 5000 units TABS tablet Take 5,000 Units by mouth daily, Historical      METOPROLOL TARTRATE PO Take 25 mg by mouth 2 times daily , Historical      Propylene Glycol-Glycerin (SOOTHE OP) Place 1 drop into both eyes 2 times daily , Historical      warfarin (COUMADIN) 5 MG tablet Take 5 mg by mouth every evening Except 2.5 mg on Tuesday/Thursday/Saturday, Historical      ACETAMINOPHEN PO Take 1,000 mg by mouth every 6 hours as needed for pain, Historical             Time Spent on this Encounter   I, Katie Valladares, personally saw the patient today and spent greater than 30 minutes discharging this patient.    Katie Valladares MD

## 2019-05-18 NOTE — PROCEDURES
Aspiration of R wrist attempted today given pain, swelling, and erythema to r/o infection. Risks of aspiration discussed with patient. Patient was prepped and draped in sterile fashion and dorsal R wrist was cleaned with betadine. An 18g needle was inserted into the wrist joint and minimal blood tinged fluid was aspirated. Needle was then removed, and aspiration site dressed. Patient tolerated the procedure well. Fluid was sent to lab but there was insufficient fluid.

## 2019-05-19 NOTE — PROGRESS NOTES
Transition Communication Hand-off for Care Transitions to Next Level of Care Provider    Name: Tameka Andersen  : 1924  MRN #: [id number]  Primary Care Provider: Markie Becker     Primary Clinic: [facility] [id number] [place] [id number]     Reason for Hospitalization:  Chronic anticoagulation [Z79.01]  Infection of thumb [L08.9]  Admit Date/Time: 2019 10:47 AM  Discharge Date: 19  Payor Source: Payor: MEDICARE / Plan: MEDICARE / Product Type: Medicare /     Readmission Assessment Measure (PAYAL) Risk Score/category: Elevated          Reason for Communication Hand-off Referral: Fragility    Discharge Plan:       Concern for non-adherence with plan of care:   Yes  Discharge Needs Assessment:      Already enrolled in Tele-monitoring program and name of program:  NA  Follow-up specialty is recommended: No    Follow-up plan:  No future appointments.    Any outstanding tests or procedures:        Referrals     Future Labs/Procedures    Home care nursing referral     Comments:    RN skilled nursing visit. Resume prior home care orders  Home health aide to assist with ADLs, bathing    Your provider has ordered home care nursing services. If you have not been contacted within 2 days of your discharge please call the inpatient department phone number at [phone] .    Home Care OT Referral for Hospital Discharge     Comments:    OT to eval and treat    Your provider has ordered home care - occupational therapy. If you have not been contacted within 2 days of your discharge please call the department phone number listed on the top of this document.    Home Care PT Referral for Hospital Discharge     Comments:    PT to eval and treat    Your provider has ordered home care - physical therapy. If you have not been contacted within 2 days of your discharge please call the department phone number listed on the top of this document.    Home Care SLP Referral for Hospital Discharge      Comments:    SLP to eval and treat    Your provider has ordered home care - speech therapy. If you have not been contacted within 2 days of your discharge please call the department phone number listed on the top of this document.    Home Care Social Service Referral for Hospital Discharge     Comments:     to continue prior home care orders    Your provider has ordered home care - . If you have not been contacted within 2 days of your discharge please call the department phone number listed on the top of this document.            Key Recommendations:  CTS identifies pt as high risk due to Elevated PAYAL. Patient was admitted to [facility] on 5/16/19 for weakness and R thumb/ wrist cellulitis/ infection. Ortho has been consulted. Patient has a history of Afib on coumadin, metastatic lung cancer, and thoracic compression fracture. Patient has recently had outpatient consults for Palliative care, hospice meeting, nutrition, pulmonary, speech and GI. Patient is currently receiving home care RN/PT/OT/HHA/SW through [facility]. Patient was hospitalized twice in March 2019 with weakness and n/v/d and was discharged to [facility] TCU in March. Patient lives independently at [facility]. Pt is to have her INR rechecked on Monday 5/20.     Rocio Bonds/ BE Gilliland RN CTS    AVS/Discharge Summary is the source of truth; this is a helpful guide for improved communication of patient story

## 2019-05-22 LAB
BACTERIA SPEC CULT: NO GROWTH
BACTERIA SPEC CULT: NO GROWTH
Lab: NORMAL
Lab: NORMAL
SPECIMEN SOURCE: NORMAL
SPECIMEN SOURCE: NORMAL

## 2022-10-06 NOTE — PHARMACY-ANTICOAGULATION SERVICE
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 Objective: Active Movements: [] N/A   [] Too acute   [] Other:  Cervical ROM AROM PROM Comments:pain, area   Forward flexion 60       Extension 46       SB right 30       SB left  40       Rotation right 45       Rotation left 55                                                                      AROM                     PROM                       MMT      Left Right Left Right Left Right   [facility] Flexion [facility]     4/5 4/5     Abduction [facility]     4/5 4/5     Extension [facility] WFL     5/5 5/5     IR [facility] W[facility]     4/5 4/5     ER [facility] WFL     4/5 4/5     Horizontal Add                 Horizontal Abd               Elbow Flexion [facility] W[facility]     4/5 4/5     Extension [facility]     4/5 4/5     Prontation                 Supination                 Objective Measures: FOTO: 45 / 48  NDI: 57       Goal Status Final:  Short Term Goals:  Patient will report the knowledge of 3 exercises that can be used to help reduce symptoms to be able to ind reduce symptoms while at home. -MET 22  Pt will report pain < 4/10 when performing all functional activity in 3 weeks.  -PROGRESSING  Patient will demonstrate increased gross Clinical Pharmacy- Warfarin Discharge Note  This patient is currently on warfarin for the treatment of Atrial fibrillation.   Expected length of therapy undetermined.           Anticoagulation Dose History     Recent Dosing and Labs Latest Ref Rng & Units 3/3/2019 3/5/2019 3/6/2019 5/16/2019 5/17/2019 5/17/2019 5/18/2019    Warfarin 1 mg - - 1 mg - - - - -    INR 0.86 - 1.14 2.65(H) 3.54(H) 4.73(H) 2.18(H) 2.11(H) 1.86(H) 1.49(H)          Vitamin K doses administered during the last 7 days: oral 10 mg    Discharge orders to take warfarin 5 mg Saturday and Sunday and INR check on Monday, 5/20/19   cervical flexion/extension PROM of +10 deg to facilitate increased ability to perform daily activities. -MET 1/5/22  Patient will decrease NDI > 10 to facilitate increased ability to perform functional activities of choice. -PROGRESSING     Long Term Goals:  Patient will demonstrate increased gross cervical flexion/extension AROM of 20 deg or greater to be able to return to leisure activities of choice. -MET 1/5/22  Patient will demonstrate the ability to perform all functional activity with < 2/10 pain. -PROGRESSING  Patient will decrease NDI > 20 to facilitate increased ability to perform leisure activities of choice. -PROGRESSING      Key Functional Changes/Progress: Pt improved cervical ROM, UE strength, and functional mobility    Assessments/Recommendations: Discontinue therapy. Progressing towards or have reached established goals. If you have any questions/comments please contact us directly at [phone]. Thank you for allowing us to assist in the care of your patient. Therapist Signature: Soy Salmon PT, DPT Date: 10/6/2022   Reporting Period: 10/5/21 - 1/5/22 Time: 10:05 AM        NOTE TO PHYSICIAN:  PLEASE COMPLETE THE ORDERS BELOW AND FAX TO   [facility]: [phone]. If you are unable to process this request in 24 hours please contact our office: [phone].    ___ I have read the above report and request that my patient be discharged from therapy.      Physician Signature:        Date:       Time:

## 2023-01-23 NOTE — CONSULTS
CTS identifies pt as high risk due to Elevated PAYAL. Patient was admitted to [facility] on 5/16/19 for weakness and R thumb/ wrist cellulitis/ infection. Ortho has been consulted. Patient has a history of Afib on coumadin, metastatic lung cancer, and thoracic compression fracture. Patient has recently had outpatient consults for Palliative care, hospice meeting, nutrition, pulmonary, speech and GI. Patient is currently receiving home care RN/PT/OT/HHA/SW through [facility]. Patient was hospitalized twice in March 2019 with weakness and n/v/d and was discharged to [facility] TCU in March. Patient lives independently at [facility].  Hospital follow up appointment was not scheduled for the pt at this time as treatment and consults are still pending.    Handoff will be given to PCP clinic at discharge.     CM will continue to follow patient for any additional discharge needs.     Rocio LAGUNAS  Care Transition Services  [phone]     General

## (undated) DEVICE — GLOVE PROTEXIS POWDER FREE 7.5 ORTHOPEDIC 2D73ET75

## (undated) DEVICE — GOWN IMPERVIOUS SPECIALTY XLG/XLONG 32474

## (undated) DEVICE — SUCTION MANIFOLD NEPTUNE 2 SYS 4 PORT 0702-020-000

## (undated) DEVICE — LINEN HALF SHEET 5512

## (undated) DEVICE — Device

## (undated) DEVICE — SU STRATAFIX PDS PLUS 0 CT-1 18" SXPP1A401

## (undated) DEVICE — CAST PADDING 4" STERILE 9044S

## (undated) DEVICE — PREP CHLORAPREP 26ML TINTED ORANGE  260815

## (undated) DEVICE — BUR OVAL 4X48MM CARBIDE  03-C0901

## (undated) DEVICE — CATH IV ANGIO INTRO 12GA 382277

## (undated) DEVICE — MANIFOLD NEPTUNE 4 PORT 700-20

## (undated) DEVICE — DRAPE CONVERTORS U-DRAPE 60X72" 8476

## (undated) DEVICE — LINEN FULL SHEET 5511

## (undated) DEVICE — ADAPTER SMOKE EVAC PREFILTER E3660

## (undated) DEVICE — DRAPE MAYO STAND 23X54 8337

## (undated) DEVICE — CAST PLASTER SPLINT XTRA FAST 5X30" 7392

## (undated) DEVICE — CATH TRAY FOLEY SURESTEP 16FR DRAIN BAG STATOCK A899916

## (undated) DEVICE — SUTURE VICRYL+ 0 CT-1 18" DYED VIO VCP740D

## (undated) DEVICE — CAST BUCKET

## (undated) DEVICE — BLADE SAW SAGITTAL STRK 13X90X1.27MM HD SYS 6 6113-127-090

## (undated) DEVICE — DRSG XEROFORM 5X9" 8884431605

## (undated) DEVICE — GLOVE PROTEXIS W/NEU-THERA 6.5  2D73TE65

## (undated) DEVICE — TUBING SUCTION 12"X1/4" N612

## (undated) DEVICE — SPONGE LAP 18X18" X8435

## (undated) DEVICE — DRAIN PENROSE 0.25"X12" LATEX FREE GR101

## (undated) DEVICE — DRAPE STERI U 1015

## (undated) DEVICE — BNDG ESMARK 4" STERILE

## (undated) DEVICE — GLOVE PROTEXIS POWDER FREE SMT 7.5  2D72PT75X

## (undated) DEVICE — SET HANDPIECE INTERPULSE W/COAXIAL FAN SPRAY TIP 0210118000

## (undated) DEVICE — GOWN XLG DISP 9545

## (undated) DEVICE — SOLUTION WOUND CLEANSING 3/4OZ 10% PVP EA-L3011FB-50

## (undated) DEVICE — ESU GROUND PAD ADULT W/CORD E7507

## (undated) DEVICE — STPL SKIN 35W APPOSE 8886803712

## (undated) DEVICE — SU FIBERWIRE 2 38"  AR-7200

## (undated) DEVICE — DRAPE C-ARM MINI 5423

## (undated) DEVICE — DRSG STERI STRIP 1/2X4" R1547

## (undated) DEVICE — IMM SHOULDER LG 79-84017

## (undated) DEVICE — SUTURE VICRYL+ 2-0 CT-2 27" UND VCP269H

## (undated) DEVICE — BLADE KNIFE SURG 10 371110

## (undated) DEVICE — TOURNIQUET CUFF 18" REPRO RED 60-7070-103

## (undated) DEVICE — PACK SHOULDER RIDGES

## (undated) DEVICE — BLADE SAW SAGITTAL 25.5X9.5X.4MM FINE LINVATEC 5023-138

## (undated) DEVICE — SUCTION TIP YANKAUER W/O VENT K86

## (undated) DEVICE — BAG CLEAR TRASH 1.3M 39X33" P4040C

## (undated) DEVICE — CAST PADDING 4" UNSTERILE 9044

## (undated) DEVICE — PACK HAND SOP32HARMO

## (undated) DEVICE — DRAPE IOBAN INCISE 23X17" 6650EZ

## (undated) DEVICE — SYR BULB IRRIG 50ML LATEX FREE 0035280

## (undated) DEVICE — SOL NACL 0.9% IRRIG 1000ML BOTTLE 2F7124

## (undated) DEVICE — DRSG GAUZE 4X4" TRAY

## (undated) DEVICE — GLOVE PROTEXIS BLUE W/NEU-THERA 7.5  2D73EB75

## (undated) DEVICE — NDL BLUNT 18GA 1" W/O FILTER 305181

## (undated) DEVICE — SUCTION TIP YANKAUER STR K87

## (undated) DEVICE — LINEN POUCH DBL 5427

## (undated) DEVICE — GLOVE PROTEXIS BLUE W/NEU-THERA 7.0  2D73EB70

## (undated) DEVICE — BNDG ELASTIC 4" DBL LENGTH UNSTERILE 6611-14

## (undated) DEVICE — DRSG ABDOMINAL 07 1/2X8" 7197D

## (undated) DEVICE — DRAPE STOCKINETTE IMPERVIOUS 12" 1587

## (undated) DEVICE — GLOVE PROTEXIS POWDER FREE 7.0 ORTHOPEDIC 2D73ET70

## (undated) RX ORDER — BUPIVACAINE HYDROCHLORIDE 2.5 MG/ML
INJECTION, SOLUTION EPIDURAL; INFILTRATION; INTRACAUDAL
Status: DISPENSED
Start: 2018-06-12

## (undated) RX ORDER — DEXAMETHASONE SODIUM PHOSPHATE 4 MG/ML
INJECTION, SOLUTION INTRA-ARTICULAR; INTRALESIONAL; INTRAMUSCULAR; INTRAVENOUS; SOFT TISSUE
Status: DISPENSED
Start: 2018-06-12

## (undated) RX ORDER — GLYCOPYRROLATE 0.2 MG/ML
INJECTION INTRAMUSCULAR; INTRAVENOUS
Status: DISPENSED
Start: 2018-06-12

## (undated) RX ORDER — LIDOCAINE HYDROCHLORIDE 10 MG/ML
INJECTION, SOLUTION EPIDURAL; INFILTRATION; INTRACAUDAL; PERINEURAL
Status: DISPENSED
Start: 2018-06-12

## (undated) RX ORDER — PROPOFOL 10 MG/ML
INJECTION, EMULSION INTRAVENOUS
Status: DISPENSED
Start: 2018-06-12

## (undated) RX ORDER — CEFAZOLIN SODIUM 1 G/3ML
INJECTION, POWDER, FOR SOLUTION INTRAMUSCULAR; INTRAVENOUS
Status: DISPENSED
Start: 2018-06-12

## (undated) RX ORDER — CEFAZOLIN SODIUM 2 G/100ML
INJECTION, SOLUTION INTRAVENOUS
Status: DISPENSED
Start: 2018-06-12

## (undated) RX ORDER — ONDANSETRON 2 MG/ML
INJECTION INTRAMUSCULAR; INTRAVENOUS
Status: DISPENSED
Start: 2018-06-12

## (undated) RX ORDER — FENTANYL CITRATE 50 UG/ML
INJECTION, SOLUTION INTRAMUSCULAR; INTRAVENOUS
Status: DISPENSED
Start: 2018-06-12